# Patient Record
Sex: MALE | Race: WHITE | Employment: FULL TIME | URBAN - METROPOLITAN AREA
[De-identification: names, ages, dates, MRNs, and addresses within clinical notes are randomized per-mention and may not be internally consistent; named-entity substitution may affect disease eponyms.]

---

## 2017-07-01 ENCOUNTER — APPOINTMENT (OUTPATIENT)
Dept: CT IMAGING | Age: 52
DRG: 308 | End: 2017-07-01
Attending: EMERGENCY MEDICINE
Payer: COMMERCIAL

## 2017-07-01 ENCOUNTER — APPOINTMENT (OUTPATIENT)
Dept: GENERAL RADIOLOGY | Age: 52
DRG: 308 | End: 2017-07-01
Attending: EMERGENCY MEDICINE
Payer: COMMERCIAL

## 2017-07-01 ENCOUNTER — APPOINTMENT (OUTPATIENT)
Dept: CT IMAGING | Age: 52
DRG: 308 | End: 2017-07-01
Attending: FAMILY MEDICINE
Payer: COMMERCIAL

## 2017-07-01 ENCOUNTER — HOSPITAL ENCOUNTER (INPATIENT)
Age: 52
LOS: 4 days | Discharge: HOME | DRG: 308 | End: 2017-07-05
Attending: EMERGENCY MEDICINE | Admitting: FAMILY MEDICINE
Payer: COMMERCIAL

## 2017-07-01 DIAGNOSIS — I48.19 PERSISTENT ATRIAL FIBRILLATION (HCC): Primary | ICD-10-CM

## 2017-07-01 DIAGNOSIS — I50.9 ACUTE CONGESTIVE HEART FAILURE, UNSPECIFIED CONGESTIVE HEART FAILURE TYPE: ICD-10-CM

## 2017-07-01 PROBLEM — I48.91 A-FIB (HCC): Status: ACTIVE | Noted: 2017-07-01

## 2017-07-01 PROBLEM — R07.9 ACUTE CHEST PAIN: Status: ACTIVE | Noted: 2017-07-01

## 2017-07-01 PROBLEM — J90 PLEURAL EFFUSION, BILATERAL: Status: ACTIVE | Noted: 2017-07-01

## 2017-07-01 PROBLEM — R60.1 ANASARCA: Status: ACTIVE | Noted: 2017-07-01

## 2017-07-01 LAB
ALBUMIN SERPL BCP-MCNC: 3.4 G/DL (ref 3.5–5)
ALBUMIN/GLOB SERPL: 0.8 {RATIO} (ref 1.1–2.2)
ALP SERPL-CCNC: 99 U/L (ref 45–117)
ALT SERPL-CCNC: 53 U/L (ref 12–78)
ANION GAP BLD CALC-SCNC: 9 MMOL/L (ref 5–15)
APTT PPP: 26.6 SEC (ref 22.1–32.5)
ARTERIAL PATENCY WRIST A: YES
AST SERPL W P-5'-P-CCNC: 37 U/L (ref 15–37)
BASE EXCESS BLD CALC-SCNC: 1 MMOL/L
BASOPHILS # BLD AUTO: 0 K/UL (ref 0–0.1)
BASOPHILS # BLD AUTO: 0 K/UL (ref 0–0.1)
BASOPHILS # BLD: 0 % (ref 0–1)
BASOPHILS # BLD: 0 % (ref 0–1)
BDY SITE: ABNORMAL
BILIRUB SERPL-MCNC: 1.2 MG/DL (ref 0.2–1)
BNP SERPL-MCNC: 132 PG/ML (ref 0–100)
BUN SERPL-MCNC: 15 MG/DL (ref 6–20)
BUN/CREAT SERPL: 11 (ref 12–20)
CALCIUM SERPL-MCNC: 8.7 MG/DL (ref 8.5–10.1)
CHLORIDE SERPL-SCNC: 106 MMOL/L (ref 97–108)
CK SERPL-CCNC: 194 U/L (ref 39–308)
CO2 SERPL-SCNC: 24 MMOL/L (ref 21–32)
CREAT SERPL-MCNC: 1.33 MG/DL (ref 0.7–1.3)
D DIMER PPP FEU-MCNC: 1.42 MG/L FEU (ref 0–0.65)
EOSINOPHIL # BLD: 0.2 K/UL (ref 0–0.4)
EOSINOPHIL # BLD: 0.3 K/UL (ref 0–0.4)
EOSINOPHIL NFR BLD: 2 % (ref 0–7)
EOSINOPHIL NFR BLD: 2 % (ref 0–7)
ERYTHROCYTE [DISTWIDTH] IN BLOOD BY AUTOMATED COUNT: 15.2 % (ref 11.5–14.5)
ERYTHROCYTE [DISTWIDTH] IN BLOOD BY AUTOMATED COUNT: 15.3 % (ref 11.5–14.5)
GAS FLOW.O2 O2 DELIVERY SYS: ABNORMAL L/MIN
GAS FLOW.O2 SETTING OXYMISER: 2 L/M
GLOBULIN SER CALC-MCNC: 4.3 G/DL (ref 2–4)
GLUCOSE SERPL-MCNC: 126 MG/DL (ref 65–100)
HCO3 BLD-SCNC: 25.4 MMOL/L (ref 22–26)
HCT VFR BLD AUTO: 41.1 % (ref 36.6–50.3)
HCT VFR BLD AUTO: 41.4 % (ref 36.6–50.3)
HGB BLD-MCNC: 13.3 G/DL (ref 12.1–17)
HGB BLD-MCNC: 13.5 G/DL (ref 12.1–17)
INR PPP: 1.1 (ref 0.9–1.1)
LIPASE SERPL-CCNC: 169 U/L (ref 73–393)
LYMPHOCYTES # BLD AUTO: 25 % (ref 12–49)
LYMPHOCYTES # BLD AUTO: 26 % (ref 12–49)
LYMPHOCYTES # BLD: 3.1 K/UL (ref 0.8–3.5)
LYMPHOCYTES # BLD: 3.1 K/UL (ref 0.8–3.5)
MCH RBC QN AUTO: 26 PG (ref 26–34)
MCH RBC QN AUTO: 26.2 PG (ref 26–34)
MCHC RBC AUTO-ENTMCNC: 32.4 G/DL (ref 30–36.5)
MCHC RBC AUTO-ENTMCNC: 32.6 G/DL (ref 30–36.5)
MCV RBC AUTO: 80.4 FL (ref 80–99)
MCV RBC AUTO: 80.4 FL (ref 80–99)
MONOCYTES # BLD: 0.7 K/UL (ref 0–1)
MONOCYTES # BLD: 0.8 K/UL (ref 0–1)
MONOCYTES NFR BLD AUTO: 6 % (ref 5–13)
MONOCYTES NFR BLD AUTO: 7 % (ref 5–13)
NEUTS SEG # BLD: 7.8 K/UL (ref 1.8–8)
NEUTS SEG # BLD: 8 K/UL (ref 1.8–8)
NEUTS SEG NFR BLD AUTO: 66 % (ref 32–75)
NEUTS SEG NFR BLD AUTO: 66 % (ref 32–75)
PCO2 BLD: 39 MMHG (ref 35–45)
PH BLD: 7.42 [PH] (ref 7.35–7.45)
PLATELET # BLD AUTO: 289 K/UL (ref 150–400)
PLATELET # BLD AUTO: 294 K/UL (ref 150–400)
PO2 BLD: 63 MMHG (ref 80–100)
POTASSIUM SERPL-SCNC: 4.2 MMOL/L (ref 3.5–5.1)
PROT SERPL-MCNC: 7.7 G/DL (ref 6.4–8.2)
PROTHROMBIN TIME: 11.5 SEC (ref 9–11.1)
RBC # BLD AUTO: 5.11 M/UL (ref 4.1–5.7)
RBC # BLD AUTO: 5.15 M/UL (ref 4.1–5.7)
SAO2 % BLD: 92 % (ref 92–97)
SODIUM SERPL-SCNC: 139 MMOL/L (ref 136–145)
SPECIMEN TYPE: ABNORMAL
THERAPEUTIC RANGE,PTTT: NORMAL SECS (ref 58–77)
TOTAL RESP. RATE, ITRR: 27
TROPONIN I SERPL-MCNC: <0.04 NG/ML
WBC # BLD AUTO: 11.8 K/UL (ref 4.1–11.1)
WBC # BLD AUTO: 12.2 K/UL (ref 4.1–11.1)

## 2017-07-01 PROCEDURE — 96375 TX/PRO/DX INJ NEW DRUG ADDON: CPT

## 2017-07-01 PROCEDURE — 93005 ELECTROCARDIOGRAM TRACING: CPT

## 2017-07-01 PROCEDURE — 83880 ASSAY OF NATRIURETIC PEPTIDE: CPT | Performed by: FAMILY MEDICINE

## 2017-07-01 PROCEDURE — 82803 BLOOD GASES ANY COMBINATION: CPT

## 2017-07-01 PROCEDURE — 85379 FIBRIN DEGRADATION QUANT: CPT | Performed by: FAMILY MEDICINE

## 2017-07-01 PROCEDURE — 36415 COLL VENOUS BLD VENIPUNCTURE: CPT | Performed by: EMERGENCY MEDICINE

## 2017-07-01 PROCEDURE — 74011250636 HC RX REV CODE- 250/636: Performed by: EMERGENCY MEDICINE

## 2017-07-01 PROCEDURE — 36600 WITHDRAWAL OF ARTERIAL BLOOD: CPT

## 2017-07-01 PROCEDURE — 74176 CT ABD & PELVIS W/O CONTRAST: CPT

## 2017-07-01 PROCEDURE — 74011000258 HC RX REV CODE- 258: Performed by: EMERGENCY MEDICINE

## 2017-07-01 PROCEDURE — 74011000250 HC RX REV CODE- 250: Performed by: EMERGENCY MEDICINE

## 2017-07-01 PROCEDURE — 65620000000 HC RM CCU GENERAL

## 2017-07-01 PROCEDURE — 96365 THER/PROPH/DIAG IV INF INIT: CPT

## 2017-07-01 PROCEDURE — 74011250636 HC RX REV CODE- 250/636: Performed by: FAMILY MEDICINE

## 2017-07-01 PROCEDURE — 71250 CT THORAX DX C-: CPT

## 2017-07-01 PROCEDURE — 85730 THROMBOPLASTIN TIME PARTIAL: CPT | Performed by: FAMILY MEDICINE

## 2017-07-01 PROCEDURE — 85025 COMPLETE CBC W/AUTO DIFF WBC: CPT | Performed by: EMERGENCY MEDICINE

## 2017-07-01 PROCEDURE — 82550 ASSAY OF CK (CPK): CPT | Performed by: EMERGENCY MEDICINE

## 2017-07-01 PROCEDURE — 77030012879 HC MSK CPAP FLL FAC PHIL -B

## 2017-07-01 PROCEDURE — 71010 XR CHEST PORT: CPT

## 2017-07-01 PROCEDURE — 85610 PROTHROMBIN TIME: CPT | Performed by: FAMILY MEDICINE

## 2017-07-01 PROCEDURE — 99285 EMERGENCY DEPT VISIT HI MDM: CPT

## 2017-07-01 PROCEDURE — 84484 ASSAY OF TROPONIN QUANT: CPT | Performed by: EMERGENCY MEDICINE

## 2017-07-01 PROCEDURE — 80053 COMPREHEN METABOLIC PANEL: CPT | Performed by: EMERGENCY MEDICINE

## 2017-07-01 PROCEDURE — 83690 ASSAY OF LIPASE: CPT | Performed by: EMERGENCY MEDICINE

## 2017-07-01 RX ORDER — DILTIAZEM HYDROCHLORIDE 5 MG/ML
20 INJECTION INTRAVENOUS
Status: COMPLETED | OUTPATIENT
Start: 2017-07-01 | End: 2017-07-01

## 2017-07-01 RX ORDER — SODIUM CHLORIDE 0.9 % (FLUSH) 0.9 %
5-10 SYRINGE (ML) INJECTION AS NEEDED
Status: DISCONTINUED | OUTPATIENT
Start: 2017-07-01 | End: 2017-07-05 | Stop reason: HOSPADM

## 2017-07-01 RX ORDER — HEPARIN SODIUM 5000 [USP'U]/ML
10000 INJECTION, SOLUTION INTRAVENOUS; SUBCUTANEOUS ONCE
Status: COMPLETED | OUTPATIENT
Start: 2017-07-01 | End: 2017-07-01

## 2017-07-01 RX ORDER — FUROSEMIDE 10 MG/ML
40 INJECTION INTRAMUSCULAR; INTRAVENOUS
Status: COMPLETED | OUTPATIENT
Start: 2017-07-01 | End: 2017-07-01

## 2017-07-01 RX ORDER — METOPROLOL TARTRATE 25 MG/1
25 TABLET, FILM COATED ORAL EVERY 8 HOURS
Status: DISCONTINUED | OUTPATIENT
Start: 2017-07-01 | End: 2017-07-03

## 2017-07-01 RX ORDER — HEPARIN SODIUM 10000 [USP'U]/100ML
9-36 INJECTION, SOLUTION INTRAVENOUS
Status: DISCONTINUED | OUTPATIENT
Start: 2017-07-01 | End: 2017-07-02 | Stop reason: SDUPTHER

## 2017-07-01 RX ORDER — DILTIAZEM HYDROCHLORIDE 5 MG/ML
10 INJECTION INTRAVENOUS
Status: COMPLETED | OUTPATIENT
Start: 2017-07-01 | End: 2017-07-01

## 2017-07-01 RX ORDER — FUROSEMIDE 10 MG/ML
40 INJECTION INTRAMUSCULAR; INTRAVENOUS DAILY
Status: DISCONTINUED | OUTPATIENT
Start: 2017-07-02 | End: 2017-07-05 | Stop reason: HOSPADM

## 2017-07-01 RX ORDER — SODIUM CHLORIDE 0.9 % (FLUSH) 0.9 %
5-10 SYRINGE (ML) INJECTION EVERY 8 HOURS
Status: DISCONTINUED | OUTPATIENT
Start: 2017-07-01 | End: 2017-07-05 | Stop reason: HOSPADM

## 2017-07-01 RX ADMIN — DILTIAZEM HYDROCHLORIDE 10 MG/HR: 5 INJECTION, SOLUTION INTRAVENOUS at 17:54

## 2017-07-01 RX ADMIN — DILTIAZEM HYDROCHLORIDE 20 MG: 5 INJECTION INTRAVENOUS at 17:51

## 2017-07-01 RX ADMIN — HEPARIN SODIUM AND DEXTROSE 9 UNITS/KG/HR: 10000; 5 INJECTION INTRAVENOUS at 20:49

## 2017-07-01 RX ADMIN — FUROSEMIDE 40 MG: 10 INJECTION, SOLUTION INTRAMUSCULAR; INTRAVENOUS at 18:59

## 2017-07-01 RX ADMIN — HEPARIN SODIUM 10000 UNITS: 5000 INJECTION, SOLUTION INTRAVENOUS; SUBCUTANEOUS at 20:49

## 2017-07-01 RX ADMIN — DILTIAZEM HYDROCHLORIDE 10 MG: 5 INJECTION INTRAVENOUS at 18:27

## 2017-07-01 NOTE — ED NOTES
Assumed care of patient from JEFF Gonzalez. Patient resting on stretcher, NAD noted at this time; Denies complaints. Bed low and locked, call bell in reach. Will continue to monitor.

## 2017-07-01 NOTE — ROUTINE PROCESS
TRANSFER - OUT REPORT:    Verbal report given to RN on Erskin Butts  being transferred to CCU 22 for routine progression of care       Report consisted of patients Situation, Background, Assessment and   Recommendations(SBAR). Information from the following report(s) SBAR was reviewed with the receiving nurse. Lines:   Peripheral IV 07/01/17 Right Antecubital (Active)   Site Assessment Clean, dry, & intact 7/1/2017  5:36 PM   Phlebitis Assessment 0 7/1/2017  5:36 PM   Infiltration Assessment 0 7/1/2017  5:36 PM   Dressing Status Clean, dry, & intact 7/1/2017  5:36 PM        Opportunity for questions and clarification was provided.       Patient transported with:   [employer]

## 2017-07-01 NOTE — ED PROVIDER NOTES
HPI Comments: 49 y.o. male with past medical history significant for arrhythmia who presents from [facility] with chief complaint of SOB. Patient has reportedly had a \"hacking\" cough and shortness of breath for almost 2 weeks. He reports being seen at [facility] and was referred to the ED after having an elevated heart rate and atrial fibrillation. Patient states that he was diagnosed with atrial fibrillation almost 1.5 years ago and was prescribed metoprolol. He reports stopping the metoprolol shortly after starting it due to associated dizziness. Patient complains of some occasional pleuritic chest pain and intermittent leg swelling. He denies having any wheezing, syncope, nausea, vomiting, or fever. There are no other acute medical concerns at this time. Social hx: nonsmoker, occasional EtOH use, no drug use  PCP: Kelby Dubin, MD  Cardiologist: Skyla Ott MD    Note written by Radha Nunez, as dictated by Yumi Montesinos MD 5:48 PM      The history is provided by the patient. No  was used. Past Medical History:   Diagnosis Date    Arrhythmia        History reviewed. No pertinent surgical history. Family History:   Problem Relation Age of Onset    Heart Disease Father     Stroke Mother        Social History     Social History    Marital status:      Spouse name: N/A    Number of children: N/A    Years of education: N/A     Occupational History    Not on file. Social History Main Topics    Smoking status: Never Smoker    Smokeless tobacco: Former User    Alcohol use No      Comment: Occassionally    Drug use: No    Sexual activity: Not on file     Other Topics Concern    Not on file     Social History Narrative         ALLERGIES: Review of patient's allergies indicates no known allergies. Review of Systems   Constitutional: Negative for fever. Eyes: Negative for visual disturbance.    Respiratory: Positive for cough and shortness of breath. Negative for wheezing. Cardiovascular: Positive for chest pain and leg swelling. Gastrointestinal: Negative for abdominal pain, diarrhea, nausea and vomiting. Genitourinary: Negative for dysuria. Musculoskeletal: Negative. Negative for back pain and neck stiffness. Skin: Negative for rash. Neurological: Negative. Negative for syncope and headaches. Psychiatric/Behavioral: Negative for confusion. All other systems reviewed and are negative. Vitals:    07/01/17 1733 07/01/17 1735   BP: 143/84 143/84   Resp: 22    SpO2: 95%    Weight: 158.8 kg (350 lb)    Height: 5' 9\" (1.753 m)             Physical Exam   Constitutional: He appears well-developed and well-nourished. No distress. HENT:   Head: Normocephalic. Eyes: Pupils are equal, round, and reactive to light. Neck: Normal range of motion. Cardiovascular: An irregular rhythm present. Tachycardia present. No murmur heard. Pulmonary/Chest: Effort normal and breath sounds normal. No respiratory distress. Abdominal: Soft. He exhibits distension. There is tenderness in the left upper quadrant and left lower quadrant. Musculoskeletal: Normal range of motion. He exhibits edema (2+ in BLE). Neurological: He is alert. He has normal strength. No cranial nerve deficit. Skin: Skin is warm and dry. Psychiatric: He has a normal mood and affect. His behavior is normal.   Nursing note and vitals reviewed. Note written by Radha Tavera, as dictated by Latrell Baldwin MD 5:48 PM    [facility]  ED Course       Procedures  ED EKG interpretation:  Rhythm: atrial fib; and regular . Rate (approx.): 166; Axis: normal; ST/T wave: non-specific changes. Note written by Radha Tavera, as dictated by Latrell Baldwin MD 6:01 PM    CONSULT NOTE:  6:58 PM Latrell Baldwin MD spoke with Dr. Fabián Feliciano, Consult for Hospitalist.  Discussed available diagnostic tests and clinical findings. He is in agreement with care plans as outlined.    Luz Maria Bob will admit the patient.

## 2017-07-01 NOTE — IP AVS SNAPSHOT
[address] [address] 
[phone] Patient: Kody Sol MRN: [id number] :1965 You are allergic to the following No active allergies Recent Documentation Height Weight BMI Smoking Status 1.753 m 155.2 kg 50.53 kg/m2 Never Smoker Emergency Contacts Name Discharge Info Relation Home Work Mobile Rosario Marcano  Spouse [3] [phone] Helen Marcano  Spouse [3] [phone] About your hospitalization You were admitted on:  2017 You last received care in the:  [facility] 4 CORONARY CARE You were discharged on:  2017 Unit phone number:  [phone] Why you were hospitalized Your primary diagnosis was:  A-Fib (Hcc) Your diagnoses also included:  Anasarca, Pleural Effusion, Bilateral  
  
  
 
  
  
Providers Seen During Your Hospitalizations Provider Role Specialty Primary office phone Jaleel Valle MD Attending Provider Emergency Medicine [phone] Haris Hdz MD Attending Provider [facility] [phone] Samanta Lim MD Attending Provider Hospitalist [phone] Reji Clark MD Attending Provider Internal Medicine [phone] Your Primary Care Physician (PCP) Primary Care Physician Office Phone Office Fax Yifan Ordonez [phone] Follow-up Information Follow up With Details Comments Contact Info Roberto Brand MD In 1 week  [address] 
[phone] Lata Lozada MD On 2017 [address] [facility] [address] [facility] 
[phone] Your Appointments 2017 10:00 AM EDT  
ESTABLISHED PATIENT with Lata Lozada MD  
[facility] ([address]) [address] [place] [address] [address] [facility]  
[phone] Current Discharge Medication List  
  
 START taking these medications Dose & Instructions Dispensing Information Comments Morning Noon Evening Bedtime  
 apixaban 5 mg tablet Commonly known as:  Andrew Morejon Your last dose was: Your next dose is:    
   
   
 Dose:  5 mg Take 1 Tab by mouth two (2) times a day. Quantity:  60 Tab Refills:  0  
     
   
   
   
  
 dofetilide 250 mcg capsule Commonly known as:  Caitlin Fisher Your last dose was: Your next dose is:    
   
   
 Dose:  250 mcg Take 1 Cap by mouth every twelve (12) hours every twelve (12) hours. Indications: PREVENTION OF RECURRENT ATRIAL FIBRILLATION Quantity:  60 Cap Refills:  3  
     
   
   
   
  
 furosemide 40 mg tablet Commonly known as:  LASIX Your last dose was: Your next dose is:    
   
   
 1 tab PO daily Quantity:  30 Tab Refills:  1  
     
   
   
   
  
 lisinopril 10 mg tablet Commonly known as:  Rodolfo Boehringer Your last dose was: Your next dose is:    
   
   
 Dose:  10 mg Take 1 Tab by mouth daily. Quantity:  30 Tab Refills:  1 CONTINUE these medications which have CHANGED Dose & Instructions Dispensing Information Comments Morning Noon Evening Bedtime  
 metoprolol succinate 50 mg XL tablet Commonly known as:  TOPROL-XL What changed:  when to take this Your last dose was: Your next dose is:    
   
   
 Dose:  50 mg Take 1 Tab by mouth daily. Quantity:  30 Tab Refills:  0 STOP taking these medications   
 aspirin, buffered 81 mg Tab Where to Get Your Medications Information on where to get these meds will be given to you by the nurse or doctor. ! Ask your nurse or doctor about these medications  
  apixaban 5 mg tablet  
 dofetilide 250 mcg capsule  
 furosemide 40 mg tablet  
 lisinopril 10 mg tablet  
 metoprolol succinate 50 mg XL tablet Discharge Instructions Discharge Instructions PATIENT ID: Kody Sol MRN: [id number] YOB: 1965 DATE OF ADMISSION: 7/1/2017  5:27 PM   
DATE OF DISCHARGE: 7/5/2017 PRIMARY CARE PROVIDER: Haim Chun MD  
 
ATTENDING PHYSICIAN: Reji Clark MD 
DISCHARGING PROVIDER: Fatemeh Shelley NP To contact this individual call [phone] and ask the  to page. If unavailable ask to be transferred the Adult Hospitalist Department. DISCHARGE DIAGNOSES A fib CONSULTATIONS: IP CONSULT TO HOSPITALIST 
IP CONSULT TO CARDIOLOGY 
IP CONSULT TO CARDIOLOGY PROCEDURES/SURGERIES: * No surgery found * PENDING TEST RESULTS:  
At the time of discharge the following test results are still pending: none FOLLOW UP APPOINTMENTS:  
Follow-up Information Follow up With Details Comments Contact Info Roberto Brand MD In 1 week  [facility] [address] [place] [address] 
[phone] Lata Lozada MD On 7/7/2017 [address] [facility] [address] [place] [address] 
[phone] ADDITIONAL CARE RECOMMENDATIONS:  
Follow up with Dr Anna Marie Farmer in 1-2 weeks DIET: Cardiac Diet ACTIVITY: Activity as tolerated DISCHARGE MEDICATIONS: 
 See Medication Reconciliation Form · It is important that you take the medication exactly as they are prescribed. · Keep your medication in the bottles provided by the pharmacist and keep a list of the medication names, dosages, and times to be taken in your wallet. · Do not take other medications without consulting your doctor. NOTIFY YOUR PHYSICIAN FOR ANY OF THE FOLLOWING:  
Fever over 101 degrees for 24 hours. Chest pain, shortness of breath, fever, chills, nausea, vomiting, diarrhea, change in mentation, falling, weakness, bleeding. Severe pain or pain not relieved by medications. Or, any other signs or symptoms that you may have questions about.  
 
 
DISPOSITION: 
x  Home With: 
 OT  PT  [facility]  RN  
  
 SNF/Inpatient Rehab/LTAC Independent/assisted living Hospice Other: CDMP Checked:  
Yes x PROBLEM LIST Updated: 
Yes x Signed: Alix Kitchen NP 
7/5/2017 
1:18 PM 
 
Discharge Instructions Attachments/References HEART FAILURE: AVOIDING TRIGGERS (ENGLISH) Discharge Orders None Introducing [facility]! [facility] [employer] introduces [employer] patient portal. Now you can access parts of your medical record, email your doctor's office, and request medication refills online. 1. In your internet browser, go to [email or web address] 2. Click on the First Time User? Click Here link in the Sign In box. You will see the New Member Sign Up page. 3. Enter your Co[employer] Path Access Code exactly as it appears below. You will not need to use this code after youve completed the sign-up process. If you do not sign up before the expiration date, you must request a new code. · [employer] Access Code: [email or web address] Expires: 10/1/2017  2:33 PM 
 
4. Enter the last four digits of your Social Security Number (xxxx) and Date of Birth (mm/dd/yyyy) as indicated and click Submit. You will be taken to the next sign-up page. 5. Create a Coherent Path ID. This will be your Coherent Path login ID and cannot be changed, so think of one that is secure and easy to remember. 6. Create a [employer] Path password. You can change your password at any time. 7. Enter your Password Reset Question and Answer. This can be used at a later time if you forget your password. 8. Enter your e-mail address. You will receive e-mail notification when new information is available in [address] 9. Click Sign Up. You can now view and download portions of your medical record. 10. Click the Download Summary menu link to download a portable copy of your medical information.  
 
If you have questions, please visit the Frequently Asked Questions section of [employer]. Remember, MyChart is NOT to be used for urgent needs. For medical emergencies, dial 911. Now available from your iPhone and Android! General Information Please provide this summary of care documentation to your next provider. Patient Signature:  ____________________________________________________________ Date:  ____________________________________________________________  
  
Catrachita Drake Provider Signature:  ____________________________________________________________ Date:  ____________________________________________________________ More Information Avoiding Triggers With Heart Failure: Care Instructions Your Care Instructions Triggers are anything that make your heart failure flare up. A flare-up is also called \"sudden heart failure\" or \"acute heart failure. \" When you have a flare-up, fluid builds up in your lungs, and you have problems breathing. You might need to go to the hospital. By watching for changes in your condition and avoiding triggers, you can prevent heart failure flare-ups. Follow-up care is a key part of your treatment and safety. Be sure to make and go to all appointments, and call your doctor if you are having problems. It's also a good idea to know your test results and keep a list of the medicines you take. How can you care for yourself at home? Watch for changes in your weight and condition · Weigh yourself without clothing at the same time each day. Record your weight. Call your doctor if you have sudden weight gain, such as more than 2 to 3 pounds in a day or 5 pounds in a week. (Your doctor may suggest a different range of weight gain.) A sudden weight gain may mean that your heart failure is getting worse. · Keep a daily record of your symptoms. Write down any changes in how you feel, such as new shortness of breath, cough, or problems eating.  Also record if your ankles are more swollen than usual and if you feel more tired than usual. Note anything that you ate or did that could have triggered these changes. Limit sodium Sodium causes your body to hold on to extra water. This may cause your heart failure symptoms to get worse. People get most of their sodium from processed foods. Fast food and restaurant meals also tend to be very high in sodium. · Your doctor may suggest that you limit sodium to 2,000 milligrams (mg) a day or less. That is less than 1 teaspoon of salt a day, including all the salt you eat in cooking or in packaged foods. · Read food labels on cans and food packages. They tell you how much sodium you get in one serving. Check the serving size. If you eat more than one serving, you are getting more sodium. · Be aware that sodium can come in forms other than salt, including monosodium glutamate (MSG), sodium citrate, and sodium bicarbonate (baking soda). MSG is often added to Asian food. You can sometimes ask for food without MSG or salt. · Slowly reducing salt will help you adjust to the taste. Take the salt shaker off the table. · Flavor your food with garlic, lemon juice, onion, vinegar, herbs, and spices instead of salt. Do not use soy sauce, steak sauce, onion salt, garlic salt, mustard, or ketchup on your food, unless it is labeled \"low-sodium\" or \"low-salt. \" 
· Make your own salad dressings, sauces, and ketchup without adding salt. · Use fresh or frozen ingredients, instead of canned ones, whenever you can. Choose low-sodium canned goods. · Eat less processed food and food from restaurants, including fast food. Exercise as directed Moderate, regular exercise is very good for your heart. It improves your blood flow and helps control your weight. But too much exercise can stress your heart and cause a heart failure flare-up.  
· Check with your doctor before you start an exercise program. 
 · Walking is an easy way to get exercise. Start out slowly. Gradually increase the length and pace of your walk. Swimming, riding a bike, and using a treadmill are also good forms of exercise. · When you exercise, watch for signs that your heart is working too hard. You are pushing yourself too hard if you cannot talk while you are exercising. If you become short of breath or dizzy or have chest pain, stop, sit down, and rest. 
· Do not exercise when you do not feel well. Take medicines correctly · Take your medicines exactly as prescribed. Call your doctor if you think you are having a problem with your medicine. · Make a list of all the medicines you take. Include those prescribed to you by other doctors and any over-the-counter medicines, vitamins, or supplements you take. Take this list with you when you go to any doctor. · Take your medicines at the same time every day. It may help you to post a list of all the medicines you take every day and what time of day you take them. · Make taking your medicine as simple as you can. Plan times to take your medicines when you are doing other things, such as eating a meal or getting ready for bed. This will make it easier to remember to take your medicines. · Get organized. Use helpful tools, such as daily or weekly pill containers. When should you call for help? Call 911 if you have symptoms of sudden heart failure such as: 
· You have severe trouble breathing. · You cough up pink, foamy mucus. · You have a new irregular or rapid heartbeat. Call your doctor now or seek immediate medical care if: 
· You have new or increased shortness of breath. · You are dizzy or lightheaded, or you feel like you may faint. · You have sudden weight gain, such as more than 2 to 3 pounds in a day or 5 pounds in a week. (Your doctor may suggest a different range of weight gain.) · You have increased swelling in your legs, ankles, or feet. · You are suddenly so tired or weak that you cannot do your usual activities. Watch closely for changes in your health, and be sure to contact your doctor if you develop new symptoms. Where can you learn more? Go to http://loreto-stephanie.info/. Enter K381 in the search box to learn more about \"Avoiding Triggers With Heart Failure: Care Instructions. \" Current as of: February 23, 2017 Content Version: 11.3 © 3492-6873 [employer]. Care instructions adapted under license by [employer] (which disclaims liability or warranty for this information). If you have questions about a medical condition or this instruction, always ask your healthcare professional. [address] any warranty or liability for your use of this information.

## 2017-07-01 NOTE — IP AVS SNAPSHOT
Current Discharge Medication List  
  
START taking these medications Dose & Instructions Dispensing Information Comments Morning Noon Evening Bedtime  
 apixaban 5 mg tablet Commonly known as:  Lane Boots Your last dose was: Your next dose is:    
   
   
 Dose:  5 mg Take 1 Tab by mouth two (2) times a day. Quantity:  60 Tab Refills:  0  
     
   
   
   
  
 dofetilide 250 mcg capsule Commonly known as:  Cayden Jaramillo Your last dose was: Your next dose is:    
   
   
 Dose:  250 mcg Take 1 Cap by mouth every twelve (12) hours every twelve (12) hours. Indications: PREVENTION OF RECURRENT ATRIAL FIBRILLATION Quantity:  60 Cap Refills:  3  
     
   
   
   
  
 furosemide 40 mg tablet Commonly known as:  LASIX Your last dose was: Your next dose is:    
   
   
 1 tab PO daily Quantity:  30 Tab Refills:  1  
     
   
   
   
  
 lisinopril 10 mg tablet Commonly known as:  Robin Spencer Your last dose was: Your next dose is:    
   
   
 Dose:  10 mg Take 1 Tab by mouth daily. Quantity:  30 Tab Refills:  1 CONTINUE these medications which have CHANGED Dose & Instructions Dispensing Information Comments Morning Noon Evening Bedtime  
 metoprolol succinate 50 mg XL tablet Commonly known as:  TOPROL-XL What changed:  when to take this Your last dose was: Your next dose is:    
   
   
 Dose:  50 mg Take 1 Tab by mouth daily. Quantity:  30 Tab Refills:  0 STOP taking these medications   
 aspirin, buffered 81 mg Tab Where to Get Your Medications Information on where to get these meds will be given to you by the nurse or doctor. ! Ask your nurse or doctor about these medications  
  apixaban 5 mg tablet  
 dofetilide 250 mcg capsule  
 furosemide 40 mg tablet  
 lisinopril 10 mg tablet metoprolol succinate 50 mg XL tablet

## 2017-07-01 NOTE — ED TRIAGE NOTES
Pt reports he was sent here from [facility] due to elevated heart rate. Pt states he has had a nonproductive cough with SOB for the past week. Pt also reports he has HX of Atrial fib but states he took himself the Metoprolol 50 mg PO BID and ASA 81 mg PO every day that he was prescribed by Dr Silviano Perez  Pt stopped taking the med's eighteen months ago.

## 2017-07-02 LAB
ANION GAP BLD CALC-SCNC: 9 MMOL/L (ref 5–15)
APTT PPP: 42.7 SEC (ref 22.1–32.5)
APTT PPP: 54.5 SEC (ref 22.1–32.5)
APTT PPP: 79.2 SEC (ref 22.1–32.5)
ATRIAL RATE: 138 BPM
BASOPHILS # BLD AUTO: 0.1 K/UL (ref 0–0.1)
BASOPHILS # BLD: 0 % (ref 0–1)
BUN SERPL-MCNC: 14 MG/DL (ref 6–20)
BUN/CREAT SERPL: 13 (ref 12–20)
CALCIUM SERPL-MCNC: 8.6 MG/DL (ref 8.5–10.1)
CALCULATED R AXIS, ECG10: 56 DEGREES
CALCULATED T AXIS, ECG11: 18 DEGREES
CHLORIDE SERPL-SCNC: 105 MMOL/L (ref 97–108)
CHOLEST SERPL-MCNC: 147 MG/DL
CO2 SERPL-SCNC: 25 MMOL/L (ref 21–32)
CREAT SERPL-MCNC: 1.04 MG/DL (ref 0.7–1.3)
DIAGNOSIS, 93000: NORMAL
EOSINOPHIL # BLD: 0.3 K/UL (ref 0–0.4)
EOSINOPHIL NFR BLD: 3 % (ref 0–7)
ERYTHROCYTE [DISTWIDTH] IN BLOOD BY AUTOMATED COUNT: 15.4 % (ref 11.5–14.5)
EST. AVERAGE GLUCOSE BLD GHB EST-MCNC: 146 MG/DL
GLUCOSE BLD STRIP.AUTO-MCNC: 115 MG/DL (ref 65–100)
GLUCOSE BLD STRIP.AUTO-MCNC: 117 MG/DL (ref 65–100)
GLUCOSE SERPL-MCNC: 108 MG/DL (ref 65–100)
HBA1C MFR BLD: 6.7 % (ref 4.2–6.3)
HCT VFR BLD AUTO: 38.4 % (ref 36.6–50.3)
HDLC SERPL-MCNC: 36 MG/DL
HDLC SERPL: 4.1 {RATIO} (ref 0–5)
HGB BLD-MCNC: 12.5 G/DL (ref 12.1–17)
LDLC SERPL CALC-MCNC: 88 MG/DL (ref 0–100)
LIPID PROFILE,FLP: NORMAL
LYMPHOCYTES # BLD AUTO: 33 % (ref 12–49)
LYMPHOCYTES # BLD: 3.7 K/UL (ref 0.8–3.5)
MCH RBC QN AUTO: 26.1 PG (ref 26–34)
MCHC RBC AUTO-ENTMCNC: 32.6 G/DL (ref 30–36.5)
MCV RBC AUTO: 80.2 FL (ref 80–99)
MONOCYTES # BLD: 0.7 K/UL (ref 0–1)
MONOCYTES NFR BLD AUTO: 6 % (ref 5–13)
NEUTS SEG # BLD: 6.4 K/UL (ref 1.8–8)
NEUTS SEG NFR BLD AUTO: 58 % (ref 32–75)
PLATELET # BLD AUTO: 245 K/UL (ref 150–400)
POTASSIUM SERPL-SCNC: 3.6 MMOL/L (ref 3.5–5.1)
Q-T INTERVAL, ECG07: 280 MS
QRS DURATION, ECG06: 88 MS
QTC CALCULATION (BEZET), ECG08: 465 MS
RBC # BLD AUTO: 4.79 M/UL (ref 4.1–5.7)
SERVICE CMNT-IMP: ABNORMAL
SERVICE CMNT-IMP: ABNORMAL
SODIUM SERPL-SCNC: 139 MMOL/L (ref 136–145)
THERAPEUTIC RANGE,PTTT: ABNORMAL SECS (ref 58–77)
TRIGL SERPL-MCNC: 115 MG/DL (ref ?–150)
VENTRICULAR RATE, ECG03: 166 BPM
VLDLC SERPL CALC-MCNC: 23 MG/DL
WBC # BLD AUTO: 11.2 K/UL (ref 4.1–11.1)

## 2017-07-02 PROCEDURE — 82962 GLUCOSE BLOOD TEST: CPT

## 2017-07-02 PROCEDURE — 85730 THROMBOPLASTIN TIME PARTIAL: CPT | Performed by: FAMILY MEDICINE

## 2017-07-02 PROCEDURE — 93970 EXTREMITY STUDY: CPT

## 2017-07-02 PROCEDURE — 93306 TTE W/DOPPLER COMPLETE: CPT

## 2017-07-02 PROCEDURE — 74011250637 HC RX REV CODE- 250/637: Performed by: SPECIALIST

## 2017-07-02 PROCEDURE — 80061 LIPID PANEL: CPT | Performed by: FAMILY MEDICINE

## 2017-07-02 PROCEDURE — 36415 COLL VENOUS BLD VENIPUNCTURE: CPT | Performed by: FAMILY MEDICINE

## 2017-07-02 PROCEDURE — 74011000258 HC RX REV CODE- 258: Performed by: SPECIALIST

## 2017-07-02 PROCEDURE — 83036 HEMOGLOBIN GLYCOSYLATED A1C: CPT | Performed by: SPECIALIST

## 2017-07-02 PROCEDURE — 77010033678 HC OXYGEN DAILY

## 2017-07-02 PROCEDURE — 80048 BASIC METABOLIC PNL TOTAL CA: CPT | Performed by: FAMILY MEDICINE

## 2017-07-02 PROCEDURE — 74011000250 HC RX REV CODE- 250: Performed by: FAMILY MEDICINE

## 2017-07-02 PROCEDURE — 74011000250 HC RX REV CODE- 250: Performed by: SPECIALIST

## 2017-07-02 PROCEDURE — 85730 THROMBOPLASTIN TIME PARTIAL: CPT | Performed by: SPECIALIST

## 2017-07-02 PROCEDURE — 74011250636 HC RX REV CODE- 250/636: Performed by: SPECIALIST

## 2017-07-02 PROCEDURE — 65620000000 HC RM CCU GENERAL

## 2017-07-02 PROCEDURE — 94660 CPAP INITIATION&MGMT: CPT

## 2017-07-02 PROCEDURE — 74011250636 HC RX REV CODE- 250/636: Performed by: FAMILY MEDICINE

## 2017-07-02 PROCEDURE — 74011250636 HC RX REV CODE- 250/636: Performed by: HOSPITALIST

## 2017-07-02 PROCEDURE — 85025 COMPLETE CBC W/AUTO DIFF WBC: CPT | Performed by: FAMILY MEDICINE

## 2017-07-02 RX ORDER — MAGNESIUM SULFATE 100 %
4 CRYSTALS MISCELLANEOUS AS NEEDED
Status: DISCONTINUED | OUTPATIENT
Start: 2017-07-02 | End: 2017-07-05 | Stop reason: HOSPADM

## 2017-07-02 RX ORDER — SODIUM CHLORIDE 0.9 % (FLUSH) 0.9 %
20 SYRINGE (ML) INJECTION
Status: COMPLETED | OUTPATIENT
Start: 2017-07-02 | End: 2017-07-02

## 2017-07-02 RX ORDER — DEXTROSE 50 % IN WATER (D50W) INTRAVENOUS SYRINGE
12.5-25 AS NEEDED
Status: DISCONTINUED | OUTPATIENT
Start: 2017-07-02 | End: 2017-07-02 | Stop reason: SDUPTHER

## 2017-07-02 RX ORDER — HEPARIN SODIUM 10000 [USP'U]/100ML
6-25 INJECTION, SOLUTION INTRAVENOUS
Status: DISCONTINUED | OUTPATIENT
Start: 2017-07-02 | End: 2017-07-03

## 2017-07-02 RX ORDER — DILTIAZEM HYDROCHLORIDE 180 MG/1
180 CAPSULE, COATED, EXTENDED RELEASE ORAL DAILY
Status: DISCONTINUED | OUTPATIENT
Start: 2017-07-02 | End: 2017-07-03

## 2017-07-02 RX ORDER — INSULIN LISPRO 100 [IU]/ML
INJECTION, SOLUTION INTRAVENOUS; SUBCUTANEOUS
Status: DISCONTINUED | OUTPATIENT
Start: 2017-07-02 | End: 2017-07-05 | Stop reason: HOSPADM

## 2017-07-02 RX ORDER — HEPARIN SODIUM 1000 [USP'U]/ML
2000 INJECTION, SOLUTION INTRAVENOUS; SUBCUTANEOUS ONCE
Status: COMPLETED | OUTPATIENT
Start: 2017-07-02 | End: 2017-07-02

## 2017-07-02 RX ADMIN — Medication 10 ML: at 07:09

## 2017-07-02 RX ADMIN — Medication 20 ML: at 11:27

## 2017-07-02 RX ADMIN — METOPROLOL TARTRATE 25 MG: 25 TABLET ORAL at 00:10

## 2017-07-02 RX ADMIN — METOPROLOL TARTRATE 25 MG: 25 TABLET ORAL at 21:15

## 2017-07-02 RX ADMIN — DILTIAZEM HYDROCHLORIDE 5 MG/HR: 5 INJECTION, SOLUTION INTRAVENOUS at 02:00

## 2017-07-02 RX ADMIN — FUROSEMIDE 40 MG: 10 INJECTION, SOLUTION INTRAMUSCULAR; INTRAVENOUS at 08:52

## 2017-07-02 RX ADMIN — Medication 10 ML: at 15:17

## 2017-07-02 RX ADMIN — PERFLUTREN 1.5 ML: 6.52 INJECTION, SUSPENSION INTRAVENOUS at 10:00

## 2017-07-02 RX ADMIN — Medication 10 ML: at 21:01

## 2017-07-02 RX ADMIN — DILTIAZEM HYDROCHLORIDE 180 MG: 180 CAPSULE, COATED, EXTENDED RELEASE ORAL at 11:27

## 2017-07-02 RX ADMIN — METOPROLOL TARTRATE 25 MG: 25 TABLET ORAL at 07:09

## 2017-07-02 RX ADMIN — HEPARIN SODIUM AND DEXTROSE 9 UNITS/KG/HR: 10000; 5 INJECTION INTRAVENOUS at 13:05

## 2017-07-02 RX ADMIN — METOPROLOL TARTRATE 25 MG: 25 TABLET ORAL at 15:17

## 2017-07-02 RX ADMIN — HEPARIN SODIUM 2000 UNITS: 1000 INJECTION, SOLUTION INTRAVENOUS; SUBCUTANEOUS at 13:03

## 2017-07-02 NOTE — PROGRESS NOTES
7/2/2017     Admit Date: 7/1/2017    Admit Diagnosis: A-fib ([facility] [address] Anasarca;Acute chest pain;Pleural effusion, loki*    Principal Problem:    A-fib ([facility] [address] (7/1/2017)    Active Problems:    Anasarca (7/1/2017)      Pleural effusion, bilateral (7/1/2017)        Subjective:  Feels better. Good diuresis. Rate better but still elevated when moves around       Eris Adorno denies chest pain, palpitations, syncope, orthopnea, paroxysmal nocturnal dyspnea, exertional chest pressure/discomfort, claudication. Assessment/Plan:  Will add po cardiazem and try to get off drip. Echo pending. Continue heparin for now, likely transition to oac's depending on whether he converts to nsr or not. Dr mei to see in consultation tomorrow. Gill Pickett MD    Objective:     Visit Vitals    /75    Pulse (!) 104    Temp 97.5 °F (36.4 °C)    Resp 21    Ht 5' 9\" (1.753 m)    Wt 348 lb 5.2 oz (158 kg)    SpO2 96%    BMI 51.44 kg/m2        Physical Exam:  Neck: supple, symmetrical, trachea midline, no adenopathy, no carotid bruit and no JVD  Heart: irregularly irregular rhythm, S1, S2 normal, no S3 or S4  Lungs: clear to auscultation bilaterally  Abdomen: soft, non-tender.  Bowel sounds normal. No masses,  no organomegaly  Extremities: varicose veins noted, edema tr      Labs:    @LABRCNT[CPK:1,CKQMB:1,CPKMB:1,CKMB:!,TROIQ:1@  Recent Labs      07/02/17   0310   NA  139   K  3.6   CL  105   BUN  14   CREA  1.04   GLU  108*   CA  8.6     Recent Labs      07/02/17 0310   WBC  11.2*   HGB  12.5   HCT  38.4   PLT  245     Recent Labs      07/02/17 0310   CHOL  147   LDLC  88       Telemetry: AFIB     Current Facility-Administered Medications   Medication Dose Route Frequency    sodium chloride (NS) flush 20 mL  20 mL IntraVENous RAD ONCE    perflutren lipid microspheres (DEFINITY) in NS bolus IV  1.5 mL IntraVENous RAD ONCE    dilTIAZem CD (CARDIZEM CD) capsule 180 mg  180 mg Oral DAILY    dilTIAZem (CARDIZEM) 125 mg in dextrose 5% 125 mL infusion  0-15 mg/hr IntraVENous TITRATE    sodium chloride (NS) flush 5-10 mL  5-10 mL IntraVENous Q8H    sodium chloride (NS) flush 5-10 mL  5-10 mL IntraVENous PRN    heparin 25,000 units in D5W 250 ml infusion  9-36 Units/kg/hr IntraVENous TITRATE    metoprolol tartrate (LOPRESSOR) tablet 25 mg  25 mg Oral Q8H    furosemide (LASIX) injection 40 mg  40 mg IntraVENous DAILY       Data Review: current meds, labs,recent radiology, intake/output/weight and problem list reviewed

## 2017-07-02 NOTE — PROGRESS NOTES
Hospitalist Progress Note      Hospital summary: 49 y.o man with afib, DM, GEE, morbid obesity, who presented with shortness of breath. He was found to be in afib w/ rvr 7/1/2017. Assessment/Plan:  Paroxysmal afib: (POA) w/ rvr on presentation  -continue diltiazem drip; transitioning to PO diltiazem per cardiology  -continue heparin drip; change protocol to afib/cardiac  -plan to consult Dr. Ubaldo Schirmer tomorrow    Acute pulmonary edema and anasarca: (POA) concerning for CHF. BNP only 132 but this is unreliable in obese patients  -continue IV furosemide    Acute renal insufficiency: (POA) Cr 0.95->1.33, suspect cardiorenal  -monitor with diuresis    GEE: (POA)  -continue home BiPAP    Type II DM: hgb a1c 6.7%    Morbid obesity    Code status: full  DVT prophylaxis: heparin drip  Disposition: home when medically appropriate  ----------------------------------------------    CC: shortness of breath    S: feels a little better compared to admission. No chest pain, does have a dry cough, denies leg swelling (though very apparent on exam), abdominal swelling is bothering him. No abdominal pain, n/v/d, palpitations, or dizziness. Review of Systems:  Pertinent items are noted in HPI.     O:  Visit Vitals    BP (!) 114/95    Pulse (!) 117    Temp 97.5 °F (36.4 °C)    Resp 24    Ht 5' 9\" (1.753 m)    Wt 158 kg (348 lb 5.2 oz)    SpO2 93%    BMI 51.44 kg/m2       PHYSICAL EXAM:  Gen: NAD, non-toxic  HEENT: anicteric sclerae, normal conjunctiva, MM moist  Neck: supple, thick neck  Heart: RRR, no MRG, unable to assess JVD due to obesity, 2+ BLE edema  Lungs: CTA b/l, diminished breath sounds globally, non-labored respirations  Abd: soft, NT, distended, BS+, unable to assess organomegaly due to obesity  Extr: warm  Skin: dry, mild chronic venous stasis changes  Neuro: CN II-XII grossly intact, normal speech, moves all extremities  Psych: normal mood, appropriate affect      Intake/Output Summary (Last 24 hours) at 07/02/17 1218  Last data filed at 07/02/17 1100   Gross per 24 hour   Intake           310.73 ml   Output             2970 ml   Net         -2659.27 ml        Recent labs & imaging reviewed:  Recent Labs      07/02/17 0310 07/01/17 2047   WBC  11.2*  12.2*   HGB  12.5  13.5   HCT  38.4  41.4   PLT  245  294     Recent Labs      07/02/17 0310 07/01/17   1738   NA  139  139   K  3.6  4.2   CL  105  106   CO2  25  24   BUN  14  15   CREA  1.04  1.33*   GLU  108*  126*   CA  8.6  8.7     Recent Labs      07/01/17   1738   SGOT  37   ALT  53   AP  99   TBILI  1.2*   TP  7.7   ALB  3.4*   GLOB  4.3*   LPSE  169     Recent Labs      07/02/17   1104  07/02/17 0310  07/01/17 2047   INR   --    --   1.1   PTP   --    --   11.5*   APTT  42.7*  79.2*  26.6        Recent Labs      07/01/17   1738   TROIQ  <0.04     Lab Results   Component Value Date/Time    Cholesterol, total 147 07/02/2017 03:10 AM    HDL Cholesterol 36 07/02/2017 03:10 AM    LDL, calculated 88 07/02/2017 03:10 AM    Triglyceride 115 07/02/2017 03:10 AM    CHOL/HDL Ratio 4.1 07/02/2017 03:10 AM     No results found for: GLUCPOC  No results found for: COLOR, APPRN, SPGRU, REFSG, NEIL, PROTU, GLUCU, KETU, BILU, UROU, EULALIA, LEUKU, GLUKE, EPSU, BACTU, WBCU, RBCU, CASTS, UCRY    Med list reviewed  Current Facility-Administered Medications   Medication Dose Route Frequency    dilTIAZem CD (CARDIZEM CD) capsule 180 mg  180 mg Oral DAILY    dilTIAZem (CARDIZEM) 125 mg in dextrose 5% 125 mL infusion  0-15 mg/hr IntraVENous TITRATE    sodium chloride (NS) flush 5-10 mL  5-10 mL IntraVENous Q8H    sodium chloride (NS) flush 5-10 mL  5-10 mL IntraVENous PRN    heparin 25,000 units in D5W 250 ml infusion  9-36 Units/kg/hr IntraVENous TITRATE    metoprolol tartrate (LOPRESSOR) tablet 25 mg  25 mg Oral Q8H    furosemide (LASIX) injection 40 mg  40 mg IntraVENous DAILY       Care Plan discussed with:  Patient/Family, Nurse and Consultant Dr. Silvia Hunter MD  Internal Medicine  Date of Service: 7/2/2017

## 2017-07-02 NOTE — CONSULTS
Date of  Admission: 7/1/2017  5:27 PM     Stephanie Saxena is a 49 y.o. male admitted for A-fib [facility]); Anasarca;Acute chest pain;Pleural effusion, loki*  Subjective: patient with known paroxysmal afib, has seen dr Steve Ng and dr mei last 2/16 at which time he was essentially assymptomatic on metoprolol 50 bid. Shortly thereafter he was having dizzy spells, perhaps orthostasis, and he stopped meds and has not fup since. Has had several normal stress tests and normal echo in the past. Couple weeks ago began with some sob and non productive cough, occasional pleuritic cp and dependent leg edema. Does not weigh himself on regular basis. Denies diabetes but last hgb a1c in chart was 6.9. He has sleep apnea and wears bipap nightly. denies palpitations, syncope, orthopnea, paroxysmal nocturnal dyspnea, exertional chest pressure/discomfort, claudication. Cardiac risk factors: family history, dyslipidemia, diabetes mellitus, obesity, male gender. Assessment/Plan: afib of unknown duration, perhaps related to clinical presentation. Pleural effusions and abdominal wall edema noted on ct scan so may be element of diastolic or systolic chf as a result of uncontrolled afib. Agree with cardiazem and will try low dose metoprolol po in addition. May be combination of the 2 drugs will be effective and tolerated. Would diurese while he is here and recheck echo. Ablation has been discussed with him in the past, but given his lack of symptoms and willingness to try meds in the past, this was not pursued. We should anticoagulate him at least for the short term in case he does not spontaneously convert and needs dc cardioversion. Once sinus rhythm is restored would consider antiarrhythmic. Will ask dr mei to resee on Monday.     Patient Active Problem List    Diagnosis Date Noted    Anasarca 07/01/2017    A-fib ([facility] 07/01/2017    Acute chest pain 07/01/2017    Pleural effusion, bilateral 07/01/2017    Atrial flutter ([facility] 01/29/2015    PAF (paroxysmal atrial fibrillation) ([facility] 12/03/2014    Obesity 12/03/2014    GEE (obstructive sleep apnea) 12/03/2014      Zeus Lazaro MD  Past Medical History:   Diagnosis Date    Arrhythmia     Sleep apnea       History reviewed. No pertinent surgical history. No Known Allergies   Family History   Problem Relation Age of Onset    Heart Disease Father     Stroke Mother       Current Facility-Administered Medications   Medication Dose Route Frequency    dilTIAZem (CARDIZEM) 125 mg in dextrose 5% 125 mL infusion  10 mg/hr IntraVENous TITRATE    sodium chloride (NS) flush 5-10 mL  5-10 mL IntraVENous Q8H    sodium chloride (NS) flush 5-10 mL  5-10 mL IntraVENous PRN    heparin 25,000 units in D5W 250 ml infusion  9-36 Units/kg/hr IntraVENous TITRATE         Review of Symptoms:  A comprehensive review of systems was negative except for that written in the HPI. Physical Exam    Visit Vitals    /72    Pulse (!) 125    Temp 98 °F (36.7 °C)    Resp 30    Ht 5' 9\" (1.753 m)    Wt (!) 350 lb 12 oz (159.1 kg)    SpO2 94%    BMI 51.8 kg/m2     Neck: supple, symmetrical, trachea midline, no adenopathy, no carotid bruit and no JVD  Heart: irregularly irregular rhythm, S1, S2 normal, no S3 or S4  Lungs: diminished breath sounds R base, L base  Abdomen: soft, non-tender.  Bowel sounds normal. No masses,  no organomegaly, morbidly obese  Extremities: edema tr  Pulses: 2+ and symmetric    Cardiographics    Telemetry: AFIB  ECG: atrial fibrillation, rate 160  Echocardiogram: Not done    Recent radiology, intake/output and wt reviewed    Labs:   Recent Results (from the past 24 hour(s))   EKG, 12 LEAD, INITIAL    Collection Time: 07/01/17  5:32 PM   Result Value Ref Range    Ventricular Rate 166 BPM    Atrial Rate 138 BPM    QRS Duration 88 ms    Q-T Interval 280 ms    QTC Calculation (Bezet) 465 ms    Calculated R Axis 56 degrees    Calculated T Axis 18 degrees    Diagnosis Atrial fibrillation  Nonspecific T wave abnormality  When compared with ECG of 25-NOV-2014 13:25,  Atrial fibrillation has replaced Sinus rhythm  Vent. rate has increased BY  86 BPM  Nonspecific T wave abnormality now evident in Lateral leads     CBC WITH AUTOMATED DIFF    Collection Time: 07/01/17  5:38 PM   Result Value Ref Range    WBC 11.8 (H) 4.1 - 11.1 K/uL    RBC 5.11 4.10 - 5.70 M/uL    HGB 13.3 12.1 - 17.0 g/dL    HCT 41.1 36.6 - 50.3 %    MCV 80.4 80.0 - 99.0 FL    MCH 26.0 26.0 - 34.0 PG    MCHC 32.4 30.0 - 36.5 g/dL    RDW 15.2 (H) 11.5 - 14.5 %    PLATELET 670 820 - 897 K/uL    NEUTROPHILS 66 32 - 75 %    LYMPHOCYTES 26 12 - 49 %    MONOCYTES 6 5 - 13 %    EOSINOPHILS 2 0 - 7 %    BASOPHILS 0 0 - 1 %    ABS. NEUTROPHILS 7.8 1.8 - 8.0 K/UL    ABS. LYMPHOCYTES 3.1 0.8 - 3.5 K/UL    ABS. MONOCYTES 0.7 0.0 - 1.0 K/UL    ABS. EOSINOPHILS 0.2 0.0 - 0.4 K/UL    ABS. BASOPHILS 0.0 0.0 - 0.1 K/UL   METABOLIC PANEL, COMPREHENSIVE    Collection Time: 07/01/17  5:38 PM   Result Value Ref Range    Sodium 139 136 - 145 mmol/L    Potassium 4.2 3.5 - 5.1 mmol/L    Chloride 106 97 - 108 mmol/L    CO2 24 21 - 32 mmol/L    Anion gap 9 5 - 15 mmol/L    Glucose 126 (H) 65 - 100 mg/dL    BUN 15 6 - 20 MG/DL    Creatinine 1.33 (H) 0.70 - 1.30 MG/DL    BUN/Creatinine ratio 11 (L) 12 - 20      GFR est AA >60 >60 ml/min/1.73m2    GFR est non-AA 57 (L) >60 ml/min/1.73m2    Calcium 8.7 8.5 - 10.1 MG/DL    Bilirubin, total 1.2 (H) 0.2 - 1.0 MG/DL    ALT (SGPT) 53 12 - 78 U/L    AST (SGOT) 37 15 - 37 U/L    Alk.  phosphatase 99 45 - 117 U/L    Protein, total 7.7 6.4 - 8.2 g/dL    Albumin 3.4 (L) 3.5 - 5.0 g/dL    Globulin 4.3 (H) 2.0 - 4.0 g/dL    A-G Ratio 0.8 (L) 1.1 - 2.2     CK W/ REFLX CKMB    Collection Time: 07/01/17  5:38 PM   Result Value Ref Range     39 - 308 U/L   TROPONIN I    Collection Time: 07/01/17  5:38 PM   Result Value Ref Range    Troponin-I, Qt. <0.04 <0.05 ng/mL   LIPASE    Collection Time: 07/01/17  5:38 PM   Result Value Ref Range    Lipase 169 73 - 393 U/L   CBC WITH AUTOMATED DIFF    Collection Time: 07/01/17  8:47 PM   Result Value Ref Range    WBC 12.2 (H) 4.1 - 11.1 K/uL    RBC 5.15 4.10 - 5.70 M/uL    HGB 13.5 12.1 - 17.0 g/dL    HCT 41.4 36.6 - 50.3 %    MCV 80.4 80.0 - 99.0 FL    MCH 26.2 26.0 - 34.0 PG    MCHC 32.6 30.0 - 36.5 g/dL    RDW 15.3 (H) 11.5 - 14.5 %    PLATELET 911 770 - 122 K/uL    NEUTROPHILS 66 32 - 75 %    LYMPHOCYTES 25 12 - 49 %    MONOCYTES 7 5 - 13 %    EOSINOPHILS 2 0 - 7 %    BASOPHILS 0 0 - 1 %    ABS. NEUTROPHILS 8.0 1.8 - 8.0 K/UL    ABS. LYMPHOCYTES 3.1 0.8 - 3.5 K/UL    ABS. MONOCYTES 0.8 0.0 - 1.0 K/UL    ABS. EOSINOPHILS 0.3 0.0 - 0.4 K/UL    ABS.  BASOPHILS 0.0 0.0 - 0.1 K/UL   PTT    Collection Time: 07/01/17  8:47 PM   Result Value Ref Range    aPTT 26.6 22.1 - 32.5 sec    aPTT, therapeutic range     58.0 - 77.0 SECS   PROTHROMBIN TIME + INR    Collection Time: 07/01/17  8:47 PM   Result Value Ref Range    INR 1.1 0.9 - 1.1      Prothrombin time 11.5 (H) 9.0 - 11.1 sec   D DIMER    Collection Time: 07/01/17  8:47 PM   Result Value Ref Range    D-dimer 1.42 (H) 0.00 - 0.65 mg/L FEU

## 2017-07-02 NOTE — PROCEDURES
[facility]  *** FINAL REPORT ***    Name: Pema Crowe  MRN: [id number]    Inpatient  : 02 Oct 1965  HIS Order #: [id number]  [id number] [facility] Visit #: [id number]  Date: 2017    TYPE OF TEST: Peripheral Venous Testing    REASON FOR TEST  Limb swelling    Right Leg:-  Deep venous thrombosis:           No  Superficial venous thrombosis:    No  Deep venous insufficiency:        Not examined  Superficial venous insufficiency: Not examined    Left Leg:-  Deep venous thrombosis:           No  Superficial venous thrombosis:    No  Deep venous insufficiency:        Not examined  Superficial venous insufficiency: Not examined      INTERPRETATION/FINDINGS  PROCEDURE:  Color duplex ultrasound imaging of lower extremity veins. FINDINGS:       Right: The common femoral, deep femoral, femoral, popliteal,  posterior tibial, peroneal, and great saphenous are patent and without   evidence of thrombus;  each is fully compressible and there is no  narrowing of the flow channel on color Doppler imaging. Phasic flow  is observed in the common femoral vein. Left:   The common femoral, deep femoral, femoral, popliteal,  posterior tibial, peroneal, and great saphenous are patent and without   evidence of thrombus;  each is fully compressible and there is no  narrowing of the flow channel on color Doppler imaging. Phasic flow  is observed in the common femoral vein. IMPRESSION:  No evidence of right or left lower extremity vein  thrombosis. ADDITIONAL COMMENTS    I have personally reviewed the data relevant to the interpretation of  this  study.     TECHNOLOGIST: Gaby Leon RVT  Signed: 2017 12:57 AM    PHYSICIAN: Marilu Madrid MD  Signed: 2017 06:24 AM

## 2017-07-02 NOTE — H&P
[address]   [address] [facility], [address] [place] [address]   HISTORY AND PHYSICAL       Name:  Puja Hollingsworth   MR#:  [id number]   :  1965   Account #:  [de-identified]        Date of Adm:  2017       CHIEF COMPLAINT: Shortness of breath. HISTORY OF PRESENT ILLNESS: A 67-year-old white male with past   medical history of atrial fibrillation, type 2 diabetes mellitus (borderline),   obstructive sleep apnea, and morbid obesity presented to the   emergency department from [facility] with chief   complaint of shortness of breath. The patient reportedly had ongoing   symptoms over the past 2 weeks. At baseline he has sleep apnea, he   uses BiPAP at nighttime. He reportedly went to [facility] today after having worsening shortness of breath,   remained constant, aggravated with any slight movement or activity   without specific or alleviating factors. He complains of dry hacking   cough. He denies chest pain. He reports chronic abdominal pain   attributed to umbilical hernia. He also notes he has chronic leg edema   which is unchanged compared to his baseline. On arrival to the   emergency department initial reported vital signs were blood pressure   143/84, heart rate of 154, respiratory rate of 22, O2 saturations 95% on   room air. The patient remained tachycardiac in the emergency   department. He underwent a workup including chest x-ray portable   showing mild increased interstitial markings with cardiomegaly,   possible bronchitis and minimal congestive changes per radiology   report. CT abdomen and pelvis without contrast showed moderate right   small to moderate left pleural effusions with mild subcutaneous edema   throughout intra-abdominal wall. There was a small fat   containing umbilical hernia, no nephrolithiasis, no hydronephrosis and   normal appendix per the radiology report.  Twelve lead EKG shows   atrial fibrillation, nonspecific T wave changes 166 beats per minute. Per the ED the patient was given diltiazem 20 mg IV plus additional 10   mg IV injection bolus followed by diltiazem IV infusion. He was also   given Lasix 40 mg x1 dose. The patient is now seen for admission to   the hospitalist service for continued evaluation and treatment. The   patient does not complain of any dizziness, light headedness,   weakness, numbness, paresthesias, slurred speech, facial droop,   headache, neck pain, back pain, chest pain, hemoptysis, nausea,   vomiting, diarrhea, melena, dysuria, hematuria, calf pain, increased leg   swelling compared to baseline, fever, chills. Patient and his wife note   that the patient has gone on frequent long road trips to [place], [place] over the last few weeks. He does not report prior history of   PE/DVT. PAST MEDICAL HISTORY:    1. Atrial fibrillation. Reportedly diagnosed approximately 2 years ago,   no followup for past 18 months per his report. Formerly followed by Dr. Ann Wiggins. Last echocardiogram 12/03/2014 revealed left   ventricular ejection fraction of 55% to 60%. 2. Type 2 diabetes mellitus -borderline per patient's report. Not on any   medicines for the same. 3. Obstructive sleep apnea. Uses BiPAP at home at night time. 4. Morbid obesity. PAST SURGICAL HISTORY: None. MEDICATIONS: Currently not taking any medications. ALLERGIES: NO KNOWN DRUG ALLERGIES. SOCIAL HISTORY: Denies smoking or illicit drugs. Positive for   occasional alcohol. . FAMILY HISTORY: Heart attack - father. Heart disease - father. Stroke   - mother. REVIEW OF SYSTEMS: Eleven systems reviewed, pertinent positives   were as in HPI otherwise negative. PHYSICAL EXAMINATION   VITAL SIGNS: Temperature of 98.0 degrees Fahrenheit. Blood pressure   127/106, heart rate of 117, respiratory rate of 24, O2 saturation is 93%   on 1.5 liters of oxygen by nasal cannula.  Reported weight 350 pounds, 158.8 kg (reported height of 5 foot 9 inches tall). GENERAL: Morbidly obese male in mild respiratory distress, even with   slight activity with increased heart rate with change in position. PSYCH: Awake, alert x3. NEUROLOGIC: GCS of 15. Moves extremities x4. Sensation grossly   intact without pleurisy or facial droop. HEENT: Normocephalic, atraumatic. PERRLA. EOMs intact. Sclerae   anicteric. Conjunctivae clear. Nares are patent. Oropharynx - tongue is   midline, nonedematous. NECK: Supple without lymphadenopathy. JVD - no carotid bruits, no   thyromegaly. LYMPH: Negative for cervical, supraclavicular adenopathy. RESPIRATORY/LUNGS: Clear to auscultation but diminished bilateral   lung fields. GI: Abdomen is soft,generalized tenderness on palpation, mostly in the   left lower quadrant without rebound, guarding or rigidity. Normal active   bowel sounds, distended. No auscultated abdominal bruits. No   palpable abdominal mass. There is diffuse old appearing rash on the   abdominal wall with dry scaling skin. BACK: No CVA tenderness, no stepoff deformity. MUSCULOSKELETAL: No acute palpable bony deformity. Negative   for calf tenderness. VASCULAR: 2+ radial, 1+ dorsalis pedis pulses without cyanosis,   clubbing. Marked nonpitting edema bilateral lower extremities with   chronic venous stasis discoloration of both legs. SKIN: Warm and dry. LABORATORY DATA: Reviewed, as follows: Sodium 139, potassium   4.2, chloride 106, CO2 of 24, BUN 15, creatinine 1.6, glucose 126,   anion gap of 9. Calcium is 8.7. GFR is 57, total bilirubin is 1.2, total   protein is 7.7, albumin is 3.4. ALT 53, AST of 37, alkaline phosphatase   of 99, lipase of 169. CK total of 194, troponin I less than 0.04. WBC   11.8, hemoglobin 13.3, hematocrit of 41.1 and platelets are 611    CT abdomen and pelvis without contrast, results are reviewed and   noted in HPI. Chest x-ray portable - results are noted in HPI. EKG - results noted. IMPRESSION AND PLAN:    1. Atrial fibrillation - rate not controlled. Admit patient to ICU. Continue   patient's diltiazem IV infusion titrated to keep goal heart rate less than   100. Of concern, in addition to atrial fibrillation were possibility for   pulmonary embolism with pronounced tachycardia, tachypnea and   hypoxia with even the slightest movement. Unfortunately the patient   has elevated creatinine and as such have not ordered a CTA of the   chest, as had concerns for use of IV contrast. As well, nuclear   medicine VQ lung scan not ordered as patient has had difficulty with   lying supine for long periods and may not obtain optimal studies. Given   the fact that the patient is not on any anticoagulant therapy without any   absolute contraindications to the same with untreated atrial fibrillation,   would start patient on heparin IV infusion per protocol. Order stat PT   and PTT/INR. 2. Acute pulmonary edema. Continue workup and plan as noted above. Concern for possible congestive heart failure. The patient already has   been placed on Lasix; however, have to monitor it closely as patient   has elevated creatinine. Placed on strict I and O's, daily weights. 3. Acute congestive heart failure. In addition will order BNP. Order 2   echocardiograms for the a.m. Consult with cardiologist.   4. Leukocytosis. Repeat CBC and no defined source of infection. 5. Elevated creatinine. Repeat renal panel. Place on strict I and O's. No   IV fluids with noted concerns of congestive heart failure. 6. Anasarca edema. The plan as noted above. 7. Chronic generalized abdominal pain with history of umbilical hernia. Continue with supportive care. 8. Bilateral pleural effusions. Continue with pulse oximetry, oxygen   therapy. 9. Obstructive sleep apnea, order BiPAP. The settings will need to be   adjusted overnight if patient is unaware of his current settings.    10. Type 2 diabetes mellitus - order Humalog insulin correctional   coverage schedule. Accu Cheks in place, order for hemoglobin A1c.   11. Morbid obesity. The patient will need diet, exercise and weight   loss. 12. Venous thromboembolism prophylaxis - Patient will be on heparin. CODE STATUS - FULL CODE. PAUL Grewal MD MP / Scarlett Mckinney   D:  07/01/2017   21:14   T:  07/02/2017   08:50   Job #:  [id number]

## 2017-07-02 NOTE — PROGRESS NOTES
2015 TRANSFER - IN REPORT:    Verbal report received from [place] (name) on Ruy Goodman  being received from ED (unit) for routine progression of care      Report consisted of patients Situation, Background, Assessment and   Recommendations(SBAR). Information from the following report(s) SBAR, ED Summary and Cardiac Rhythm A fib 100-120s was reviewed with the receiving nurse. Opportunity for questions and clarification was provided. 2025 Assessment completed upon patients arrival to unit and care assumed. Primary Nurse Sharyn Mccormick RN and Jovany Ware RN performed a dual skin assessment on this patient   No impairment noted  Dhruv score is 22 Hyperpigmentation and dusky areas noted on shins along with a few scabs. Pt states this is d/t previous history of cellulitis. New PIV started. Heparin drip started at 9. Education and handout provided regarding AF and associated risks. 2130 Dr. Lydia Carlson at bedside talking to patient and wife. Pt's HR increased to 140s.   2200 To CT.  2240 Returned from CT.   2300 Page out to vascular tech for duplex.    [address] \"will be here when he can\"

## 2017-07-03 LAB
ANION GAP BLD CALC-SCNC: 8 MMOL/L (ref 5–15)
APTT PPP: 54.7 SEC (ref 22.1–32.5)
APTT PPP: 59.8 SEC (ref 22.1–32.5)
APTT PPP: 68.3 SEC (ref 22.1–32.5)
BUN SERPL-MCNC: 13 MG/DL (ref 6–20)
BUN/CREAT SERPL: 14 (ref 12–20)
CALCIUM SERPL-MCNC: 7 MG/DL (ref 8.5–10.1)
CHLORIDE SERPL-SCNC: 110 MMOL/L (ref 97–108)
CO2 SERPL-SCNC: 25 MMOL/L (ref 21–32)
CREAT SERPL-MCNC: 0.91 MG/DL (ref 0.7–1.3)
ERYTHROCYTE [DISTWIDTH] IN BLOOD BY AUTOMATED COUNT: 15.6 % (ref 11.5–14.5)
GLUCOSE BLD STRIP.AUTO-MCNC: 103 MG/DL (ref 65–100)
GLUCOSE BLD STRIP.AUTO-MCNC: 116 MG/DL (ref 65–100)
GLUCOSE BLD STRIP.AUTO-MCNC: 120 MG/DL (ref 65–100)
GLUCOSE BLD STRIP.AUTO-MCNC: 133 MG/DL (ref 65–100)
GLUCOSE SERPL-MCNC: 100 MG/DL (ref 65–100)
HCT VFR BLD AUTO: 34.5 % (ref 36.6–50.3)
HGB BLD-MCNC: 11 G/DL (ref 12.1–17)
MAGNESIUM SERPL-MCNC: 1.9 MG/DL (ref 1.6–2.4)
MAGNESIUM SERPL-MCNC: 2 MG/DL (ref 1.6–2.4)
MCH RBC QN AUTO: 25.6 PG (ref 26–34)
MCHC RBC AUTO-ENTMCNC: 31.9 G/DL (ref 30–36.5)
MCV RBC AUTO: 80.4 FL (ref 80–99)
PHOSPHATE SERPL-MCNC: 3.6 MG/DL (ref 2.6–4.7)
PLATELET # BLD AUTO: 235 K/UL (ref 150–400)
POTASSIUM SERPL-SCNC: 3.2 MMOL/L (ref 3.5–5.1)
POTASSIUM SERPL-SCNC: 3.9 MMOL/L (ref 3.5–5.1)
RBC # BLD AUTO: 4.29 M/UL (ref 4.1–5.7)
SERVICE CMNT-IMP: ABNORMAL
SODIUM SERPL-SCNC: 143 MMOL/L (ref 136–145)
THERAPEUTIC RANGE,PTTT: ABNORMAL SECS (ref 58–77)
WBC # BLD AUTO: 8.9 K/UL (ref 4.1–11.1)

## 2017-07-03 PROCEDURE — 74011250637 HC RX REV CODE- 250/637: Performed by: INTERNAL MEDICINE

## 2017-07-03 PROCEDURE — 93041 RHYTHM ECG TRACING: CPT

## 2017-07-03 PROCEDURE — 5A2204Z RESTORATION OF CARDIAC RHYTHM, SINGLE: ICD-10-PCS | Performed by: INTERNAL MEDICINE

## 2017-07-03 PROCEDURE — 74011250636 HC RX REV CODE- 250/636: Performed by: SPECIALIST

## 2017-07-03 PROCEDURE — 84100 ASSAY OF PHOSPHORUS: CPT | Performed by: HOSPITALIST

## 2017-07-03 PROCEDURE — 94660 CPAP INITIATION&MGMT: CPT

## 2017-07-03 PROCEDURE — 84132 ASSAY OF SERUM POTASSIUM: CPT | Performed by: INTERNAL MEDICINE

## 2017-07-03 PROCEDURE — 85027 COMPLETE CBC AUTOMATED: CPT | Performed by: HOSPITALIST

## 2017-07-03 PROCEDURE — 83735 ASSAY OF MAGNESIUM: CPT | Performed by: INTERNAL MEDICINE

## 2017-07-03 PROCEDURE — 74011250637 HC RX REV CODE- 250/637: Performed by: SPECIALIST

## 2017-07-03 PROCEDURE — 83735 ASSAY OF MAGNESIUM: CPT | Performed by: HOSPITALIST

## 2017-07-03 PROCEDURE — 65620000000 HC RM CCU GENERAL

## 2017-07-03 PROCEDURE — 92960 CARDIOVERSION ELECTRIC EXT: CPT

## 2017-07-03 PROCEDURE — B246ZZ4 ULTRASONOGRAPHY OF RIGHT AND LEFT HEART, TRANSESOPHAGEAL: ICD-10-PCS | Performed by: INTERNAL MEDICINE

## 2017-07-03 PROCEDURE — 77030018729 HC ELECTRD DEFIB PAD CARD -B

## 2017-07-03 PROCEDURE — 74011250637 HC RX REV CODE- 250/637: Performed by: HOSPITALIST

## 2017-07-03 PROCEDURE — 82962 GLUCOSE BLOOD TEST: CPT

## 2017-07-03 PROCEDURE — 74011000258 HC RX REV CODE- 258: Performed by: HOSPITALIST

## 2017-07-03 PROCEDURE — 93325 DOPPLER ECHO COLOR FLOW MAPG: CPT

## 2017-07-03 PROCEDURE — 74011250636 HC RX REV CODE- 250/636: Performed by: INTERNAL MEDICINE

## 2017-07-03 PROCEDURE — 36415 COLL VENOUS BLD VENIPUNCTURE: CPT | Performed by: HOSPITALIST

## 2017-07-03 PROCEDURE — 80048 BASIC METABOLIC PNL TOTAL CA: CPT | Performed by: HOSPITALIST

## 2017-07-03 PROCEDURE — 74011250636 HC RX REV CODE- 250/636: Performed by: HOSPITALIST

## 2017-07-03 PROCEDURE — 85730 THROMBOPLASTIN TIME PARTIAL: CPT | Performed by: FAMILY MEDICINE

## 2017-07-03 RX ORDER — METOPROLOL SUCCINATE 50 MG/1
50 TABLET, EXTENDED RELEASE ORAL DAILY
Status: DISCONTINUED | OUTPATIENT
Start: 2017-07-04 | End: 2017-07-05 | Stop reason: HOSPADM

## 2017-07-03 RX ORDER — POTASSIUM CHLORIDE 750 MG/1
40 TABLET, FILM COATED, EXTENDED RELEASE ORAL
Status: COMPLETED | OUTPATIENT
Start: 2017-07-03 | End: 2017-07-03

## 2017-07-03 RX ORDER — ATROPINE SULFATE 0.1 MG/ML
0.5 INJECTION INTRAVENOUS AS NEEDED
Status: DISCONTINUED | OUTPATIENT
Start: 2017-07-03 | End: 2017-07-03

## 2017-07-03 RX ORDER — POTASSIUM CHLORIDE 750 MG/1
40 TABLET, FILM COATED, EXTENDED RELEASE ORAL DAILY
Status: COMPLETED | OUTPATIENT
Start: 2017-07-04 | End: 2017-07-05

## 2017-07-03 RX ORDER — SODIUM CHLORIDE 0.9 % (FLUSH) 0.9 %
5 SYRINGE (ML) INJECTION AS NEEDED
Status: DISCONTINUED | OUTPATIENT
Start: 2017-07-03 | End: 2017-07-03

## 2017-07-03 RX ORDER — DOFETILIDE 0.5 MG/1
500 CAPSULE ORAL EVERY 12 HOURS
Status: DISCONTINUED | OUTPATIENT
Start: 2017-07-03 | End: 2017-07-04

## 2017-07-03 RX ORDER — POTASSIUM CHLORIDE 7.45 MG/ML
10 INJECTION INTRAVENOUS
Status: COMPLETED | OUTPATIENT
Start: 2017-07-03 | End: 2017-07-03

## 2017-07-03 RX ORDER — FENTANYL CITRATE 50 UG/ML
25-200 INJECTION, SOLUTION INTRAMUSCULAR; INTRAVENOUS
Status: DISCONTINUED | OUTPATIENT
Start: 2017-07-03 | End: 2017-07-03

## 2017-07-03 RX ORDER — MIDAZOLAM HYDROCHLORIDE 1 MG/ML
.5-1 INJECTION, SOLUTION INTRAMUSCULAR; INTRAVENOUS
Status: DISCONTINUED | OUTPATIENT
Start: 2017-07-03 | End: 2017-07-03

## 2017-07-03 RX ORDER — LISINOPRIL 10 MG/1
10 TABLET ORAL DAILY
Status: DISCONTINUED | OUTPATIENT
Start: 2017-07-04 | End: 2017-07-05 | Stop reason: HOSPADM

## 2017-07-03 RX ORDER — LANOLIN ALCOHOL/MO/W.PET/CERES
400 CREAM (GRAM) TOPICAL 2 TIMES DAILY
Status: DISCONTINUED | OUTPATIENT
Start: 2017-07-03 | End: 2017-07-05 | Stop reason: HOSPADM

## 2017-07-03 RX ADMIN — MIDAZOLAM HYDROCHLORIDE 2 MG: 1 INJECTION, SOLUTION INTRAMUSCULAR; INTRAVENOUS at 16:36

## 2017-07-03 RX ADMIN — CALCIUM GLUCONATE 1 G: 94 INJECTION, SOLUTION INTRAVENOUS at 11:12

## 2017-07-03 RX ADMIN — METOPROLOL TARTRATE 25 MG: 25 TABLET ORAL at 07:30

## 2017-07-03 RX ADMIN — FUROSEMIDE 40 MG: 10 INJECTION, SOLUTION INTRAMUSCULAR; INTRAVENOUS at 08:24

## 2017-07-03 RX ADMIN — MIDAZOLAM HYDROCHLORIDE 3 MG: 1 INJECTION, SOLUTION INTRAMUSCULAR; INTRAVENOUS at 16:34

## 2017-07-03 RX ADMIN — DOFETILIDE 500 MCG: 0.5 CAPSULE ORAL at 19:57

## 2017-07-03 RX ADMIN — POTASSIUM CHLORIDE 10 MEQ: 10 INJECTION, SOLUTION INTRAVENOUS at 13:43

## 2017-07-03 RX ADMIN — DILTIAZEM HYDROCHLORIDE 180 MG: 180 CAPSULE, COATED, EXTENDED RELEASE ORAL at 08:24

## 2017-07-03 RX ADMIN — Medication 400 MG: at 19:14

## 2017-07-03 RX ADMIN — Medication 400 MG: at 13:46

## 2017-07-03 RX ADMIN — Medication 10 ML: at 05:18

## 2017-07-03 RX ADMIN — Medication 10 ML: at 21:37

## 2017-07-03 RX ADMIN — HEPARIN SODIUM AND DEXTROSE 11 UNITS/KG/HR: 10000; 5 INJECTION INTRAVENOUS at 05:16

## 2017-07-03 RX ADMIN — METOPROLOL TARTRATE 25 MG: 25 TABLET ORAL at 13:46

## 2017-07-03 RX ADMIN — POTASSIUM CHLORIDE 10 MEQ: 10 INJECTION, SOLUTION INTRAVENOUS at 11:12

## 2017-07-03 RX ADMIN — APIXABAN 5 MG: 5 TABLET, FILM COATED ORAL at 21:33

## 2017-07-03 RX ADMIN — MIDAZOLAM HYDROCHLORIDE 2 MG: 1 INJECTION, SOLUTION INTRAMUSCULAR; INTRAVENOUS at 16:43

## 2017-07-03 RX ADMIN — POTASSIUM CHLORIDE 40 MEQ: 750 TABLET, FILM COATED, EXTENDED RELEASE ORAL at 11:11

## 2017-07-03 RX ADMIN — MIDAZOLAM HYDROCHLORIDE 2 MG: 1 INJECTION, SOLUTION INTRAMUSCULAR; INTRAVENOUS at 16:45

## 2017-07-03 RX ADMIN — FENTANYL CITRATE 50 MCG: 50 INJECTION, SOLUTION INTRAMUSCULAR; INTRAVENOUS at 16:43

## 2017-07-03 RX ADMIN — FENTANYL CITRATE 50 MCG: 50 INJECTION, SOLUTION INTRAMUSCULAR; INTRAVENOUS at 16:34

## 2017-07-03 NOTE — PROCEDURES
CARDIOVERSION  Procedure date: 7/3/2017     PROCEDURE:   Electrical synchronized cardioversions of atrial fibrillation. BRIEF HISTORY:  This is a 49 y. o.man with the history of persistent atrial fibrillation with rapid ventricular rate morbid obesity and LVEF has dropped. He presented with acute systolic CHF NYHA class 3 and despite cardizem IV his ventricular rate was still fast at times. He is on heparin and DESTINEE does not show thrombus. The risks and benefits have been discussed with him and he agreed to proceed. PROCEDURE IN DETAIL:   The patient is now in the supine position and the pads were applied in the anterior posterior direction. She was given additional Versed, fentanyl after a DESTINEE. Thereafter the conscious sedation was maintained and 200 joule biphasic shock was delivered in anterior posterior direction and did not convert her to sinus rhythm. Another 360 joule shock was then delivered in the anterior-posterior direction and converted to sinus rhythm . The patient was then arousable. COMPLICATIONS:  None.     Specimen removed: NONE  ASSESSMENT AND PLAN:  The patient will be kept in hospital for eliquis and tikosyn with CHF GDMT  Follow up outpatient to re determine LVEF if he can maintain NSR

## 2017-07-03 NOTE — INTERDISCIPLINARY ROUNDS
IDR/SLIDR Summary          Patient: Erin Del Cid MRN: [id number]    Age: 49 y.o. YOB: 1965 Room/Bed: [address] [place] [address]   Admit Diagnosis: A-fib (HCC)  Anasarca  Acute chest pain  Pleural effusion, bilateral  Principal Diagnosis: A-fib (HCC)   Goals: HR control and anticoagulate   Readmission: NO  Quality Measure: CHF  VTE Prophylaxis: Mechanical and Chemical  Influenza Vaccine screening completed? YES  Pneumococcal Vaccine screening completed? YES  Mobility needs: Yes   Nutrition plan:Yes  Consults:P.T, O.T. and Case Management    Financial concerns:Yes  Escalated to CM? YES  RRAT Score: 9     [facility]   Testing due for pt today?  NO  LOS: 2 days Expected length of stay 5 days  Discharge plan: home   PCP: Pineda Perales MD  Transportation needs: No    Days before discharge:two or more days before discharge   Discharge disposition: Home    Signed:     Brooklyn Culp RN  7/3/2017  4:42 AM

## 2017-07-03 NOTE — PROGRESS NOTES
Bedside and Verbal shift change report given to Mari Cobian (oncoming nurse) by Marielena Morales (offgoing nurse). Report included the following information SBAR, Kardex, ED Summary, OR Summary, Procedure Summary, Intake/Output, MAR, Accordion, Recent Results, Med Rec Status, Cardiac Rhythm Afib/Afib RVR and Alarm Parameters . No events overnight, wore BiPAP, titrated heparin gtt to 11 units next aPTT draw at 11am 7/3      Bedside and Verbal shift change report given to Ximena Olson (oncoming nurse) by Mari Cobian (offgoing nurse). Report included the following information SBAR, Kardex, ED Summary, OR Summary, Procedure Summary, Intake/Output, MAR, Accordion, Recent Results, Med Rec Status, Cardiac Rhythm Afib and Alarm Parameters .

## 2017-07-03 NOTE — PROGRESS NOTES
TRANSFER - OUT REPORT:    Verbal report given to [place] on Alana Tipton being transferred to CCU for routine progression of care       Report consisted of patients Situation, Background, Assessment and   Recommendations(SBAR). Information from the following report(s) SBAR, Procedure Summary and MAR was reviewed with the receiving nurse. Opportunity for questions and clarification was provided.

## 2017-07-03 NOTE — PROGRESS NOTES
Cardiology Progress Note         7/3/2017 4:16 PM    Admit Date: 7/1/2017    Admit Diagnosis: A-fib ([address] Anasarca;Acute chest pain;Pleural effusion, loki*      Subjective: Ernesto Spicer is still in AFIB RVR  + SOB  He does not yet want ablation  He stopped meds because he thought he will not be back in AFIB and they made him dizzy    Past Medical History:   Diagnosis Date    Arrhythmia     Sleep apnea      No past surgery  Social History     Social History    Marital status:      Spouse name: N/A    Number of children: N/A    Years of education: N/A     Occupational History    Not on file.      Social History Main Topics    Smoking status: Never Smoker    Smokeless tobacco: Former User    Alcohol use No      Comment: Occassionally    Drug use: No    Sexual activity: Not on file     Other Topics Concern    Not on file     Social History Narrative         Visit Vitals    /65 (BP 1 Location: Left arm, BP Patient Position: At rest)    Pulse 99    Temp 98.2 °F (36.8 °C)    Resp 24    Ht 5' 9\" (1.753 m)    Wt 345 lb 14.4 oz (156.9 kg)    SpO2 99%    BMI 51.08 kg/m2     Current Facility-Administered Medications   Medication Dose Route Frequency    magnesium oxide (MAG-OX) tablet 400 mg  400 mg Oral BID    [START ON 7/4/2017] potassium chloride SR (KLOR-CON 10) tablet 40 mEq  40 mEq Oral DAILY    dilTIAZem CD (CARDIZEM CD) capsule 180 mg  180 mg Oral DAILY    insulin lispro (HUMALOG) injection   SubCUTAneous AC&HS    glucose chewable tablet 16 g  4 Tab Oral PRN    glucagon (GLUCAGEN) injection 1 mg  1 mg IntraMUSCular PRN    heparin 25,000 units in D5W 250 ml infusion  6-25 Units/kg/hr (Order-Specific) IntraVENous TITRATE    dextrose 10 % infusion 125-250 mL  125-250 mL IntraVENous PRN    dilTIAZem (CARDIZEM) 125 mg in dextrose 5% 125 mL infusion  0-15 mg/hr IntraVENous TITRATE    sodium chloride (NS) flush 5-10 mL  5-10 mL IntraVENous Q8H    sodium chloride (NS) flush 5-10 mL  5-10 mL IntraVENous PRN    metoprolol tartrate (LOPRESSOR) tablet 25 mg  25 mg Oral Q8H    furosemide (LASIX) injection 40 mg  40 mg IntraVENous DAILY         Objective:      Physical Exam:  Visit Vitals    /65 (BP 1 Location: Left arm, BP Patient Position: At rest)    Pulse 99    Temp 98.2 °F (36.8 °C)    Resp 24    Ht 5' 9\" (1.753 m)    Wt 345 lb 14.4 oz (156.9 kg)    SpO2 99%    BMI 51.08 kg/m2     General Appearance:  Well developed, well nourished,alert and oriented x 3, and individual in no acute distress. Ears/Nose/Mouth/Throat:   Hearing grossly normal.         Neck: Supple. Chest:   Lungs clear to auscultation bilaterally. Cardiovascular:  irregular rhythm, no murmur. Abdomen:   Soft, morbid obesity   Extremities: 2+ edema bilaterally. Skin: Warm and dry. Data Review:   Labs:  Recent Results (from the past 24 hour(s))   GLUCOSE, POC    Collection Time: 07/02/17  4:47 PM   Result Value Ref Range    Glucose (POC) 115 (H) 65 - 100 mg/dL    Performed by Day Horne    PTT    Collection Time: 07/02/17  7:05 PM   Result Value Ref Range    aPTT 54.5 (H) 22.1 - 32.5 sec    aPTT, therapeutic range     58.0 - 77.0 SECS   GLUCOSE, POC    Collection Time: 07/02/17  9:20 PM   Result Value Ref Range    Glucose (POC) 117 (H) 65 - 100 mg/dL    Performed by Ashlee Paez    PTT    Collection Time: 07/03/17  3:34 AM   Result Value Ref Range    aPTT 54.7 (H) 22.1 - 32.5 sec    aPTT, therapeutic range     58.0 - 77.0 SECS   CBC W/O DIFF    Collection Time: 07/03/17  3:34 AM   Result Value Ref Range    WBC 8.9 4.1 - 11.1 K/uL    RBC 4.29 4. 10 - 5.70 M/uL    HGB 11.0 (L) 12.1 - 17.0 g/dL    HCT 34.5 (L) 36.6 - 50.3 %    MCV 80.4 80.0 - 99.0 FL    MCH 25.6 (L) 26.0 - 34.0 PG    MCHC 31.9 30.0 - 36.5 g/dL    RDW 15.6 (H) 11.5 - 14.5 %    PLATELET 374 603 - 697 K/uL   METABOLIC PANEL, BASIC    Collection Time: 07/03/17  3:34 AM   Result Value Ref Range    Sodium 143 136 - 145 mmol/L    Potassium 3.2 (L) 3.5 - 5.1 mmol/L    Chloride 110 (H) 97 - 108 mmol/L    CO2 25 21 - 32 mmol/L    Anion gap 8 5 - 15 mmol/L    Glucose 100 65 - 100 mg/dL    BUN 13 6 - 20 MG/DL    Creatinine 0.91 0.70 - 1.30 MG/DL    BUN/Creatinine ratio 14 12 - 20      GFR est AA >60 >60 ml/min/1.73m2    GFR est non-AA >60 >60 ml/min/1.73m2    Calcium 7.0 (L) 8.5 - 10.1 MG/DL   MAGNESIUM    Collection Time: 07/03/17  3:34 AM   Result Value Ref Range    Magnesium 1.9 1.6 - 2.4 mg/dL   PHOSPHORUS    Collection Time: 07/03/17  3:34 AM   Result Value Ref Range    Phosphorus 3.6 2.6 - 4.7 MG/DL   GLUCOSE, POC    Collection Time: 07/03/17  7:27 AM   Result Value Ref Range    Glucose (POC) 120 (H) 65 - 100 mg/dL    Performed by Kellen Almonte    PTT    Collection Time: 07/03/17 11:15 AM   Result Value Ref Range    aPTT 59.8 (H) 22.1 - 32.5 sec    aPTT, therapeutic range     58.0 - 77.0 SECS   GLUCOSE, POC    Collection Time: 07/03/17 11:16 AM   Result Value Ref Range    Glucose (POC) 116 (H) 65 - 100 mg/dL    Performed by SEGUN ZHNAG        Telemetry: AFIB RVR      Assessment:     Principal Problem:    A-fib ([address] (7/1/2017)    Active Problems:    Anasarca (7/1/2017)      Pleural effusion, bilateral (9/5/1852)    acute systolic CHF NYHA class 3    Plan:   Replace potassium  Continue with heparin  He agrees with DESTINEE and cardioversion and maintain NSR with tikosyn  He did not like possible side effects of amiodarone  He is not ready to agree to ablation  Optimize GDMT for systolic dysfunction but this could be due to tachycardia  I discussed with Dr Hanny Boyce, his attending this week  Aubrey Naranjo M.D.  [facility]  Electrophysiology/Cardiology  [facility]  [address], [place] [address], [address]  [address]  [phone]

## 2017-07-03 NOTE — PROGRESS NOTES
Cardiac Cath Lab Procedure Area Arrival Note:    Alana Tipton arrived to Cardiac Cath Lab, Procedure Area. Patient identifiers verified with NAME and DATE OF BIRTH. Procedure verified with patient. Consent forms verified. Allergies verified. Patient informed of procedure and plan of care. Questions answered with review. Patient voiced understanding of procedure and plan of care. Patient on cardiac monitor, non-invasive blood pressure, SPO2 monitor. On Roomair or O2 @ 2 lpm via NC.  IV of 0.9ns on pump at 25 ml/hr. Patient status doing well without problems. Patient is A&Ox 3. Patient reports no s/s of discomfort. Patient medicated during procedure with orders obtained and verified by Dr. Mp Lux. Refer to patients Cardiac Cath Lab PROCEDURE REPORT for vital signs, assessment, status, and response during procedure, printed at end of case. Printed report on chart or scanned into chart.

## 2017-07-03 NOTE — PROGRESS NOTES
Problem: Impaired Skin Integrity/Pressure Injury Treatment  Goal: *Prevention of pressure ulcer  Outcome: Progressing Towards Goal  Turns Self

## 2017-07-03 NOTE — PROGRESS NOTES
0730 Bedside and Verbal shift change report given to Vianney Meza (oncoming nurse) by James Bruce (offgoing nurse). Report included the following information SBAR, Kardex, ED Summary, Procedure Summary, Intake/Output, MAR, Accordion and Recent Results. 65 Dr. Beau Mckeon and Dr. Mari Fine @ bedside    1100 Plan for DESTINEE and cardioversion around 1500    1200 PTT Therapeutic, no rate change    1540 Cath Lab @ bedside to take pt down for DESTINEE/cardioversion. 1650 TRANSFER - IN REPORT:    Verbal report received from Caryl(name) on Carlos Flaherty  being received from Cath Lab(unit) for routine progression of care      Report consisted of patients Situation, Background, Assessment and   Recommendations(SBAR). Information from the following report(s) SBAR, Kardex, Procedure Summary, Intake/Output, MAR, Accordion and Recent Results was reviewed with the receiving nurse. Opportunity for questions and clarification was provided. Assessment completed upon patients arrival to unit and care assumed. 1730 Pt arrived to unit via bed form Cath Lab.    1745 Dr. Beau Mckeon @ bedside speaking with family, orders received. 1850 PTT Therapeutic, no rate change. 2nd therapeutic PTT. 1930 Bedside and Verbal shift change report given to Lety (oncoming nurse) by Vianney Meza (offgoing nurse). Report included the following information SBAR, Kardex, ED Summary, Procedure Summary, Intake/Output, MAR, Accordion and Recent Results.

## 2017-07-03 NOTE — PROCEDURES
Cardiac Procedure Note   Patient: Lois Persaud  MRN: [id number]  SSN: [id number]   YOB: 1965 Age: 49 y.o.   Sex: male    Date of Procedure: 7/3/2017   Pre-procedure Diagnosis: acute systolic CHF NYHA 3, atrial fibrillation RVR morbid obesity  Post-procedure Diagnosis: same  Procedure: DESTINEE and Cardioversion  :  Dr. Raeann Jara MD    Assistant(s):  None  Anesthesia: Moderate Sedation   Estimated Blood Loss: none  Specimens Removed: None  Findings: global severe hypokinesis  LVEF 20%  No LA thrombus, mild MR and TR  Cardioversion 211 J to NSR  Complications: None   Implants:  None  Signed by:  Raeann Jara MD  7/3/2017  5:11 PM

## 2017-07-03 NOTE — PROGRESS NOTES
Cardiac Cath Lab Recovery Arrival Note:      Michelle Santamaria arrived to Cardiac Cath Lab, Recovery Area. Staff introduced to patient. Patient identifiers verified with NAME and DATE OF BIRTH. Procedure verified with patient. Consent forms reviewed and signed by patient or authorized representative and verified. Allergies verified. Patient and family oriented to department. Patient and family informed of procedure and plan of care. Questions answered with review. Patient prepped for procedure, per orders from physician, prior to arrival.    Patient on cardiac monitor, non-invasive blood pressure, SPO2 monitor. On RA. Patient is A&Ox 4. Patient reports no pain. Patient in stretcher, in low position, with side rails up, call bell within reach, patient instructed to call if assistance as needed. Patient prep in: [address], [place] [address]. Patient family has pager #   Family in: . Prep by: PASQUALE Golden RN

## 2017-07-03 NOTE — NURSE NAVIGATOR
Chart reviewed by Heart Failure Nurse Navigator. Heart Failure database completed. EF 35%. ACEi/ARB:Contraindicated due to low blood pressure. BB: Lopressor 25 mg. On Toprol XL 50 mg . CRT Not indicated. NYHA Functional Class III. Heart Failure Teach Back in Patient Education. Heart Failure Avoiding Triggers on Discharge Instructions.       Outpatient nurse navigator notified of admission

## 2017-07-03 NOTE — PROGRESS NOTES
Hospitalist Progress Note      Hospital summary: 49 y.o man with afib, DM, GEE, morbid obesity, who presented with shortness of breath. He was found to be in afib w/ rvr 7/1/2017. Assessment/Plan:  Paroxysmal afib: (POA) w/ rvr on presentation  -transitioning from IV to PO diltiazem and PO metoprolol  -continue heparin drip  -d/w Dr. 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 BNP only 132 but this is unreliable in obese patients  -continue IV furosemide    Hypokalemia: due to diuresis  -replete; goal >4    Hypocalcemia: replete    Acute renal insufficiency: (POA) Cr 0.95->1.33, suspect cardiorenal  -improved with diuresis    GEE: (POA)  -continue home BiPAP    Type II DM: hgb a1c 6.7%    Morbid obesity    Code status: full  DVT prophylaxis: heparin drip  Disposition: home when medically appropriate  ----------------------------------------------    CC: shortness of breath     S: dyspnea is much improved, leg edema improved, no chest pain, no palpitations or dizziness, still not at baseline. No n/v/d. Review of Systems:  Pertinent items are noted in HPI.     O:  Visit Vitals    /66 (BP 1 Location: Left arm, BP Patient Position: At rest)    Pulse (!) 115    Temp 98.2 °F (36.8 °C)    Resp 20    Ht 5' 9\" (1.753 m)    Wt 156.9 kg (345 lb 14.4 oz)    SpO2 98%    BMI 51.08 kg/m2       PHYSICAL EXAM:  Gen: NAD, non-toxic  HEENT: anicteric sclerae, normal conjunctiva, MM moist  Neck: supple, thick neck  Heart: irreg irreg, tachycardic, no MRG, unable to assess JVD due to obesity, 1+ BLE edema  Lungs: CTA b/l, diminished breath sounds globally, non-labored respirations  Abd: soft, NT, distended, BS+  Extr: warm  Skin: dry, mild chronic venous stasis changes  Neuro: CN II-XII grossly intact, normal speech, moves all extremities  Psych: normal mood, appropriate affect      Intake/Output Summary (Last 24 hours) at 07/03/17 1241  Last data filed at 07/03/17 1048   Gross per 24 hour   Intake           432.37 ml   Output             1725 ml   Net         -1292.63 ml        Recent labs & imaging reviewed:  Recent Labs      07/03/17   0334  07/02/17   0310   WBC  8.9  11.2*   HGB  11.0*  12.5   HCT  34.5*  38.4   PLT  235  245     Recent Labs      07/03/17   0334  07/02/17   0310  07/01/17   1738   NA  143  139  139   K  3.2*  3.6  4.2   CL  110*  105  106   CO2  25  25  24   BUN  13  14  15   CREA  0.91  1.04  1.33*   GLU  100  108*  126*   CA  7.0*  8.6  8.7   MG  1.9   --    --    PHOS  3.6   --    --      Recent Labs      07/01/17   1738   SGOT  37   ALT  53   AP  99   TBILI  1.2*   TP  7.7   ALB  3.4*   GLOB  4.3*   LPSE  169     Recent Labs      07/03/17   1115  07/03/17   0334  07/02/17   1905   07/01/17   2047   INR   --    --    --    --   1.1   PTP   --    --    --    --   11.5*   APTT  59.8*  54.7*  54.5*   < >  26.6    < > = values in this interval not displayed.         Recent Labs      07/01/17   1738   TROIQ  <0.04     Lab Results   Component Value Date/Time    Cholesterol, total 147 07/02/2017 03:10 AM    HDL Cholesterol 36 07/02/2017 03:10 AM    LDL, calculated 88 07/02/2017 03:10 AM    Triglyceride 115 07/02/2017 03:10 AM    CHOL/HDL Ratio 4.1 07/02/2017 03:10 AM     Lab Results   Component Value Date/Time    Glucose (POC) 116 07/03/2017 11:16 AM    Glucose (POC) 120 07/03/2017 07:27 AM    Glucose (POC) 117 07/02/2017 09:20 PM    Glucose (POC) 115 07/02/2017 04:47 PM     No results found for: COLOR, APPRN, SPGRU, REFSG, NEIL, PROTU, GLUCU, KETU, BILU, UROU, EULALIA, LEUKU, GLUKE, EPSU, BACTU, WBCU, RBCU, CASTS, UCRY    Med list reviewed  Current Facility-Administered Medications   Medication Dose Route Frequency    potassium chloride 10 mEq in 100 ml IVPB  10 mEq IntraVENous Q1H    magnesium oxide (MAG-OX) tablet 400 mg  400 mg Oral BID    [START ON 7/4/2017] potassium chloride SR (KLOR-CON 10) tablet 40 mEq  40 mEq Oral DAILY    dilTIAZem CD (CARDIZEM CD) capsule 180 mg  180 mg Oral DAILY    insulin lispro (HUMALOG) injection   SubCUTAneous AC&HS    glucose chewable tablet 16 g  4 Tab Oral PRN    glucagon (GLUCAGEN) injection 1 mg  1 mg IntraMUSCular PRN    heparin 25,000 units in D5W 250 ml infusion  6-25 Units/kg/hr (Order-Specific) IntraVENous TITRATE    dextrose 10 % infusion 125-250 mL  125-250 mL IntraVENous PRN    dilTIAZem (CARDIZEM) 125 mg in dextrose 5% 125 mL infusion  0-15 mg/hr IntraVENous TITRATE    sodium chloride (NS) flush 5-10 mL  5-10 mL IntraVENous Q8H    sodium chloride (NS) flush 5-10 mL  5-10 mL IntraVENous PRN    metoprolol tartrate (LOPRESSOR) tablet 25 mg  25 mg Oral Q8H    furosemide (LASIX) injection 40 mg  40 mg IntraVENous DAILY       Care Plan discussed with:  Patient/Family, Nurse and Consultant Dr. Gilford Peer, MD  Internal Medicine  Date of Service: 7/3/2017

## 2017-07-04 LAB
ANION GAP BLD CALC-SCNC: 6 MMOL/L (ref 5–15)
ATRIAL RATE: 73 BPM
BUN SERPL-MCNC: 14 MG/DL (ref 6–20)
BUN/CREAT SERPL: 13 (ref 12–20)
CALCIUM SERPL-MCNC: 8.6 MG/DL (ref 8.5–10.1)
CALCULATED P AXIS, ECG09: -5 DEGREES
CALCULATED R AXIS, ECG10: 37 DEGREES
CALCULATED T AXIS, ECG11: 9 DEGREES
CHLORIDE SERPL-SCNC: 103 MMOL/L (ref 97–108)
CO2 SERPL-SCNC: 27 MMOL/L (ref 21–32)
CREAT SERPL-MCNC: 1.06 MG/DL (ref 0.7–1.3)
DIAGNOSIS, 93000: NORMAL
GLUCOSE BLD STRIP.AUTO-MCNC: 103 MG/DL (ref 65–100)
GLUCOSE BLD STRIP.AUTO-MCNC: 107 MG/DL (ref 65–100)
GLUCOSE BLD STRIP.AUTO-MCNC: 108 MG/DL (ref 65–100)
GLUCOSE BLD STRIP.AUTO-MCNC: 128 MG/DL (ref 65–100)
GLUCOSE SERPL-MCNC: 99 MG/DL (ref 65–100)
MAGNESIUM SERPL-MCNC: 2.3 MG/DL (ref 1.6–2.4)
P-R INTERVAL, ECG05: 160 MS
PHOSPHATE SERPL-MCNC: 4 MG/DL (ref 2.6–4.7)
POTASSIUM SERPL-SCNC: 3.8 MMOL/L (ref 3.5–5.1)
Q-T INTERVAL, ECG07: 430 MS
QRS DURATION, ECG06: 96 MS
QTC CALCULATION (BEZET), ECG08: 473 MS
SERVICE CMNT-IMP: ABNORMAL
SODIUM SERPL-SCNC: 136 MMOL/L (ref 136–145)
VENTRICULAR RATE, ECG03: 73 BPM

## 2017-07-04 PROCEDURE — 74011250637 HC RX REV CODE- 250/637: Performed by: INTERNAL MEDICINE

## 2017-07-04 PROCEDURE — 84100 ASSAY OF PHOSPHORUS: CPT | Performed by: HOSPITALIST

## 2017-07-04 PROCEDURE — 93041 RHYTHM ECG TRACING: CPT

## 2017-07-04 PROCEDURE — 80048 BASIC METABOLIC PNL TOTAL CA: CPT | Performed by: HOSPITALIST

## 2017-07-04 PROCEDURE — 65620000000 HC RM CCU GENERAL

## 2017-07-04 PROCEDURE — 83735 ASSAY OF MAGNESIUM: CPT | Performed by: HOSPITALIST

## 2017-07-04 PROCEDURE — 74011250636 HC RX REV CODE- 250/636: Performed by: SPECIALIST

## 2017-07-04 PROCEDURE — 36415 COLL VENOUS BLD VENIPUNCTURE: CPT | Performed by: HOSPITALIST

## 2017-07-04 PROCEDURE — 82962 GLUCOSE BLOOD TEST: CPT

## 2017-07-04 RX ORDER — SODIUM CHLORIDE 0.9 % (FLUSH) 0.9 %
5-10 SYRINGE (ML) INJECTION EVERY 8 HOURS
Status: DISCONTINUED | OUTPATIENT
Start: 2017-07-04 | End: 2017-07-05 | Stop reason: HOSPADM

## 2017-07-04 RX ORDER — SODIUM CHLORIDE 0.9 % (FLUSH) 0.9 %
5-10 SYRINGE (ML) INJECTION AS NEEDED
Status: DISCONTINUED | OUTPATIENT
Start: 2017-07-04 | End: 2017-07-05 | Stop reason: HOSPADM

## 2017-07-04 RX ORDER — DOFETILIDE 0.25 MG/1
250 CAPSULE ORAL EVERY 12 HOURS
Status: DISCONTINUED | OUTPATIENT
Start: 2017-07-04 | End: 2017-07-05 | Stop reason: HOSPADM

## 2017-07-04 RX ORDER — NALOXONE HYDROCHLORIDE 0.4 MG/ML
0.4 INJECTION, SOLUTION INTRAMUSCULAR; INTRAVENOUS; SUBCUTANEOUS AS NEEDED
Status: DISCONTINUED | OUTPATIENT
Start: 2017-07-04 | End: 2017-07-05 | Stop reason: HOSPADM

## 2017-07-04 RX ADMIN — LISINOPRIL 10 MG: 10 TABLET ORAL at 08:33

## 2017-07-04 RX ADMIN — FUROSEMIDE 40 MG: 10 INJECTION, SOLUTION INTRAMUSCULAR; INTRAVENOUS at 08:33

## 2017-07-04 RX ADMIN — METOPROLOL SUCCINATE 50 MG: 50 TABLET, EXTENDED RELEASE ORAL at 08:33

## 2017-07-04 RX ADMIN — APIXABAN 5 MG: 5 TABLET, FILM COATED ORAL at 08:33

## 2017-07-04 RX ADMIN — Medication 400 MG: at 18:41

## 2017-07-04 RX ADMIN — Medication 10 ML: at 06:51

## 2017-07-04 RX ADMIN — Medication 10 ML: at 09:00

## 2017-07-04 RX ADMIN — POTASSIUM CHLORIDE 40 MEQ: 750 TABLET, FILM COATED, EXTENDED RELEASE ORAL at 08:33

## 2017-07-04 RX ADMIN — Medication 10 ML: at 21:34

## 2017-07-04 RX ADMIN — APIXABAN 5 MG: 5 TABLET, FILM COATED ORAL at 18:41

## 2017-07-04 RX ADMIN — Medication 400 MG: at 08:33

## 2017-07-04 RX ADMIN — DOFETILIDE 500 MCG: 0.5 CAPSULE ORAL at 08:32

## 2017-07-04 RX ADMIN — Medication 10 ML: at 16:13

## 2017-07-04 RX ADMIN — Medication 10 ML: at 21:33

## 2017-07-04 RX ADMIN — DOFETILIDE 250 MCG: 0.25 CAPSULE ORAL at 21:32

## 2017-07-04 NOTE — PROGRESS NOTES
Problem: Afib Pathway: Day 3  Goal: Off Pathway (Use only if patient is Off Pathway)  Outcome: Progressing Towards Goal  Started on Tikosyn 7/3/17. Converted back to NSR with 2nd dose this AM.  EKG completed, QTc .52 and dropping. [facility], NP aware. Problem: General Medical Care Plan  Goal: *Fluid volume balance  Outcome: Progressing Towards Goal  IV lasix. 1300+ out. Problem: Falls - Risk of  Goal: *Absence of falls  Outcome: Progressing Towards Goal  Up with minimal assist.  Calls for help with cords. Problem: Diabetes Self-Management  Goal: *Monitoring blood glucose, interpreting and using results  Identify recommended blood glucose targets and personal targets. Outcome: Progressing Towards Goal  Has not needed insulin coverage. Comments:   0800 Receved patient asleep after verbal report from [facility].  0830 assessment completed, meds given, pt eating breakfast.  Remains in AFib after converting back at 10pm.  Asymptomatic  1030 staff reports patient changed to NSR this AM.  QTc checked by Chivo Reid RN per order in [facility] 2h post Tikosyn dose. 1200 Remains in NSR. EKG done. Notified NP of rhythm change and QTc measurement. 1600 reassess done, no changes. Pt teaching done regarding Tikosyn. Gave pt education sheet, explained we are watching QTc and what QT is. Explained why it is important to monitor for this drug and what risks it increases. [address] c/w Dr Herson Morrison. Change Tikosyn to 1/2 dose. 1930 Verbal report given to JEFF Blake. Opportunity for questions provided.

## 2017-07-04 NOTE — INTERDISCIPLINARY ROUNDS
IDR/SLIDR Summary          Patient: Betzaida Grayson MRN: [id number]    Age: 49 y.o. YOB: 1965 Room/Bed: [address] [place] [address]   Admit Diagnosis: A-fib (HCC)  Anasarca  Acute chest pain  Pleural effusion, bilateral  Principal Diagnosis: A-fib (HCC)   Goals: HR control & anticoagulate  Readmission: NO  Quality Measure: CHF  VTE Prophylaxis: Mechanical and Chemical  Influenza Vaccine screening completed? YES  Pneumococcal Vaccine screening completed? YES  Mobility needs: Yes   Nutrition plan:Yes  Consults:Respiratory and Case Management    Financial concerns:Yes  Escalated to CM? YES  RRAT Score: 8   [facility]   Testing due for pt today? NO  LOS: 3 days Expected length of stay ?  days  Discharge plan: tbd   PCP: Yakelin Gonzalez MD  Transportation needs: No    Days before discharge:two or more days before discharge   Discharge disposition: Home    Signed:     Adolfo Rey RN  7/4/2017  12:11 AM

## 2017-07-04 NOTE — PROGRESS NOTES
Cardiology Progress Note         7/4/2017 4:16 PM    Admit Date: 7/1/2017    Admit Diagnosis: A-fib (HCC)  Anasarca  Acute chest pain  Pleural effusion, bilateral      Subjective: Lois Persaud is still in AFIB RVR since last night  He currently denies SOB while at rest  No chest pain  He denies blood in stool or urine. He denies lightheadedness or dizziness when getting OOB. Past Medical History:   Diagnosis Date    Arrhythmia     Sleep apnea      No past surgery  Social History     Social History    Marital status:      Spouse name: N/A    Number of children: N/A    Years of education: N/A     Occupational History    Not on file.      Social History Main Topics    Smoking status: Never Smoker    Smokeless tobacco: Former User    Alcohol use No      Comment: Occassionally    Drug use: No    Sexual activity: Not on file     Other Topics Concern    Not on file     Social History Narrative         Visit Vitals    /80    Pulse (!) 110    Temp 98 °F (36.7 °C)    Resp 20    Ht 5' 9\" (1.753 m)    Wt 339 lb 14.4 oz (154.2 kg)    SpO2 94%    BMI 50.19 kg/m2     Current Facility-Administered Medications   Medication Dose Route Frequency    sodium chloride (NS) flush 5-10 mL  5-10 mL IntraVENous Q8H    sodium chloride (NS) flush 5-10 mL  5-10 mL IntraVENous PRN    naloxone (NARCAN) injection 0.4 mg  0.4 mg IntraVENous PRN    magnesium oxide (MAG-OX) tablet 400 mg  400 mg Oral BID    potassium chloride SR (KLOR-CON 10) tablet 40 mEq  40 mEq Oral DAILY    dofetilide (TIKOSYN) capsule 500 mcg  500 mcg Oral Q12H    lisinopril (PRINIVIL, ZESTRIL) tablet 10 mg  10 mg Oral DAILY    metoprolol succinate (TOPROL-XL) XL tablet 50 mg  50 mg Oral DAILY    apixaban (ELIQUIS) tablet 5 mg  5 mg Oral BID    dofetilide (Tikosyn) QTc check 2-hours following administration  1 Each Other Q12H    insulin lispro (HUMALOG) injection   SubCUTAneous AC&HS    glucose chewable tablet 16 g  4 Tab Oral PRN    glucagon (GLUCAGEN) injection 1 mg  1 mg IntraMUSCular PRN    dextrose 10 % infusion 125-250 mL  125-250 mL IntraVENous PRN    sodium chloride (NS) flush 5-10 mL  5-10 mL IntraVENous Q8H    sodium chloride (NS) flush 5-10 mL  5-10 mL IntraVENous PRN    furosemide (LASIX) injection 40 mg  40 mg IntraVENous DAILY         Objective:      Physical Exam:  Visit Vitals    /80    Pulse (!) 110    Temp 98 °F (36.7 °C)    Resp 20    Ht 5' 9\" (1.753 m)    Wt 339 lb 14.4 oz (154.2 kg)    SpO2 94%    BMI 50.19 kg/m2     General Appearance:  Well developed, well nourished,alert and oriented x 3, and individual in no acute distress. Ears/Nose/Mouth/Throat:   Hearing grossly normal.         Neck: Supple. Chest:   Lungs clear to auscultation bilaterally. Cardiovascular:  Regular rate regular rhythm, no murmur. Abdomen:   Soft, morbid obesity   Extremities: 2+ edema bilaterally. Skin: Warm and dry.                  Data Review:   Labs:    Recent Results (from the past 24 hour(s))   PTT    Collection Time: 07/03/17 11:15 AM   Result Value Ref Range    aPTT 59.8 (H) 22.1 - 32.5 sec    aPTT, therapeutic range     58.0 - 77.0 SECS   GLUCOSE, POC    Collection Time: 07/03/17 11:16 AM   Result Value Ref Range    Glucose (POC) 116 (H) 65 - 100 mg/dL    Performed by Elvia Morning    PTT    Collection Time: 07/03/17  5:47 PM   Result Value Ref Range    aPTT 68.3 (H) 22.1 - 32.5 sec    aPTT, therapeutic range     58.0 - 77.0 SECS   POTASSIUM    Collection Time: 07/03/17  5:47 PM   Result Value Ref Range    Potassium 3.9 3.5 - 5.1 mmol/L   MAGNESIUM    Collection Time: 07/03/17  5:47 PM   Result Value Ref Range    Magnesium 2.0 1.6 - 2.4 mg/dL   GLUCOSE, POC    Collection Time: 07/03/17  5:48 PM   Result Value Ref Range    Glucose (POC) 103 (H) 65 - 100 mg/dL    Performed by Ava Blackmon    ECG RHYTHM ANALYSIS ADULT    Collection Time: 07/03/17  5:57 PM   Result Value Ref Range    Ventricular Rate 73 BPM    Atrial Rate 73 BPM    P-R Interval 160 ms    QRS Duration 96 ms    Q-T Interval 430 ms    QTC Calculation (Bezet) 473 ms    Calculated P Axis -5 degrees    Calculated R Axis 37 degrees    Calculated T Axis 9 degrees    Diagnosis       Normal sinus rhythm  Nonspecific T wave abnormality  Prolonged QT  When compared with ECG of 01-JUL-2017 17:32,  Sinus rhythm has replaced Atrial fibrillation  Vent. rate has decreased BY  93 BPM     GLUCOSE, POC    Collection Time: 07/03/17  9:35 PM   Result Value Ref Range    Glucose (POC) 133 (H) 65 - 100 mg/dL    Performed by [id number], BASIC    Collection Time: 07/04/17  3:14 AM   Result Value Ref Range    Sodium 136 136 - 145 mmol/L    Potassium 3.8 3.5 - 5.1 mmol/L    Chloride 103 97 - 108 mmol/L    CO2 27 21 - 32 mmol/L    Anion gap 6 5 - 15 mmol/L    Glucose 99 65 - 100 mg/dL    BUN 14 6 - 20 MG/DL    Creatinine 1.06 0.70 - 1.30 MG/DL    BUN/Creatinine ratio 13 12 - 20      GFR est AA >60 >60 ml/min/1.73m2    GFR est non-AA >60 >60 ml/min/1.73m2    Calcium 8.6 8.5 - 10.1 MG/DL   MAGNESIUM    Collection Time: 07/04/17  3:14 AM   Result Value Ref Range    Magnesium 2.3 1.6 - 2.4 mg/dL   PHOSPHORUS    Collection Time: 07/04/17  3:14 AM   Result Value Ref Range    Phosphorus 4.0 2.6 - 4.7 MG/DL   GLUCOSE, POC    Collection Time: 07/04/17  6:50 AM   Result Value Ref Range    Glucose (POC) 103 (H) 65 - 100 mg/dL    Performed by Janak Givens        Telemetry: AFIB RVR rate 110-130 converted to NSR this around 1030       Assessment:     Principal Problem:    A-fib ([address] (7/1/2017)    Active Problems:    Anasarca (7/1/2017)      Pleural effusion, bilateral (9/6/5894)    acute systolic CHF NYHA class 3    Plan:   Back in atrial fibrillation last night around 2200 then he converted back to NSR around 1030 AM.   He will receive his second dose of Tikosyn this morning, will need ECG 2 hours post dose   K3.8/Mag 2.3  BP controlled.    He is on eliquis for embolic CVA prevention. He is not ready to agree to ablation  Optimize GDMT for systolic dysfunction but this could be due to tachycardia    Saw and evaluated pt and agree with above assessment and plan. Pt back in sinus rhythm this past hour. Will continue to monitor in ICU; possible to floor bed tomorrow. Getting EKGs with tikosyn. Dr. Stone Montana to follow.   Lyssa Navarro, MD Lenz Draft NP  [phone]  [facility]

## 2017-07-04 NOTE — PROGRESS NOTES
Bedside and Verbal shift change report given to Lety (oncoming nurse) by Spencer Suarez (offgoing nurse). Report included the following information SBAR, Kardex, Intake/Output, MAR, Accordion, Recent Results and Cardiac Rhythm -NSR.     2000- Assumed care. Pt resting comfortably, VSS, NSR, RA, no c/o.  2100 - Up to BSC, BM x1.   2300 - QTc check post 250mcg Tikosyn dose given @ 2130, QTc 0.48, will monitor. 0100 - O2 sats down to low 80s-90%, placed on Bipap per order for HS.  0400 - Pt resting comfortably, VSS, remains NSR, no c/o.  0500 - Spoke to wife, update given. 0630 - CHG bath given. Bedside and Verbal shift change report given to Seb Valle (oncoming nurse) by Chuck Sanchez (offgoing nurse). Report included the following information SBAR, Kardex, Intake/Output, MAR, Accordion, Recent Results and Cardiac Rhythm -NSR.

## 2017-07-04 NOTE — PROGRESS NOTES
Bedside and Verbal shift change report given to LUCIA (oncoming nurse) by Carmen forest (offgoing nurse). Report included the following information SBAR, Kardex, Intake/Output, MAR, Accordion, Recent Results and Cardiac Rhythm -NSR.     2000 - Assumed care. Pt resting comfortably, VSS, NSR, O2 2L NC, no c/o.  2100 - Visitors at bedside. 2200 - Up to BS, BM x1. A fib, -140s, no c/o CP or SOB. 0000 - Pt remains in A fib, stable HR 80-90s, resting comfortably. Declines Bipap @ present, wants to try remaining on NC O2, O2 sats % on NC, will monitor. 0300 - HR 90s-120, VSS, A fib, resting comfortably. 0500 - Spoke w/ family, update given. 0630 - CHG bath. Family @ bedside. Bedside and Verbal shift change report given to Mercedes (oncoming nurse) by LUCIA (offgoing nurse). Report included the following information SBAR, Kardex, Intake/Output, MAR, Accordion, Recent Results and Cardiac Rhythm -A fib.

## 2017-07-04 NOTE — PROGRESS NOTES
Spiritual Care Assessment/Progress Notes    Heydi Louis [id number]  [id number]    1965  49 y.o.  male    Patient Telephone Number: [phone] (home)   Confucianism Affiliation: Reagan Vance   Language: English   Extended Emergency Contact Information  Primary Emergency Contact: Pedro Gavin Phone: [phone]  Relation: Spouse  Secondary Emergency Contact: Helen Marcano  Home Phone: [phone]  Relation: Spouse   Patient Active Problem List    Diagnosis Date Noted    Anasarca 07/01/2017    [facility] 07/01/2017    Acute chest pain 07/01/2017    Pleural effusion, bilateral 07/01/2017    Atrial flutter ([facility] 01/29/2015    PAF (paroxysmal atrial fibrillation) ([facility] 12/03/2014    Obesity 12/03/2014    GEE (obstructive sleep apnea) 12/03/2014        Date: 7/4/2017       Level of Confucianism/Spiritual Activity:  [x]         Involved in alena tradition/spiritual practice    []         Not involved in alena tradition/spiritual practice  []         Spiritually oriented    []         Claims no spiritual orientation    []         seeking spiritual identity  []         Feels alienated from Catholic practice/tradition  []         Feels angry about Catholic practice/tradition  [x]         Spirituality/Catholic tradition is a resource for coping at this time.   []         Not able to assess due to medical condition    Services Provided Today:  []         crisis intervention    []         reading Scriptures  [x]         spiritual assessment    []         prayer  [x]         empathic listening/emotional support  []         rites and rituals (cite in comments)  []         life review     []         Catholic support  []         theological development   []         advocacy  []         ethical dialog     []         blessing  []         bereavement support    [x]         support to family  []         anticipatory grief support   []         help with AMD  []         spiritual guidance    []         meditation      Spiritual Care Needs  [x]         Emotional Support  []         Spiritual/Yarsani Care  []         Loss/Adjustment  []         Advocacy/Referral                /Ethics  []         No needs expressed at               this time  []         Other: (note in               comments)  [address]  []         Follow up visits with               pt/family  []         Provide materials  []         Schedule sacraments  []         Contact Community               Clergy  [x]         Follow up as needed  []         Other: (note in               comments)     Visited pt who was being attended to by medical staff. Met pt's wife, Farzana Garcia just outside of pt's room. Listened to her concerns about his medical condition and the fear she experienced when he was in afib and required medical intervention. Pt and wife are both persons of [employer]ena and members of [employer]. Chaplains will follow as needed.     Chaplain Pascale, MDiv, MS, [facility]  [phone]

## 2017-07-04 NOTE — PROGRESS NOTES
Hospitalist Progress Note      Hospital summary: 49 y.o man with afib, DM, GEE, morbid obesity, who presented with shortness of breath. He was found to be in afib w/ rvr 7/1/2017. 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 BNP only 132 but this is unreliable in obese patients  -continue IV furosemide    Hypokalemia: due to diuresis  -replete; goal >4    Hypocalcemia: replete    Acute renal insufficiency: (POA) Cr 0.95->1.33, suspect cardiorenal  -improved with diuresis    GEE: (POA)  -continue home BiPAP    Type II DM: hgb a1c 6.7%    Morbid obesity    Code status: full  DVT prophylaxis: Eliquis  Disposition: Will keep the patient in CCU for today  ----------------------------------------------    CC: shortness of breath     S: dyspnea is much improved, leg edema improved, no chest pain, no palpitations or dizziness, still not at baseline. No n/v/d. Review of Systems:  Pertinent items are noted in HPI.     O:  Visit Vitals    /80    Pulse (!) 110    Temp 98 °F (36.7 °C)    Resp 20    Ht 5' 9\" (1.753 m)    Wt 154.2 kg (339 lb 14.4 oz)    SpO2 94%    BMI 50.19 kg/m2       PHYSICAL EXAM:  Gen: NAD, non-toxic  HEENT: anicteric sclerae, normal conjunctiva, MM moist  Neck: supple, thick neck  Heart: irreg irreg, tachycardic, no MRG, unable to assess JVD due to obesity, 1+ BLE edema  Lungs: CTA b/l, diminished breath sounds globally, non-labored respirations  Abd: soft, NT, distended, BS+  Extr: warm  Skin: dry, mild chronic venous stasis changes  Neuro: CN II-XII grossly intact, normal speech, moves all extremities  Psych: normal mood, appropriate affect      Intake/Output Summary (Last 24 hours) at 07/04/17 1144  Last data filed at 07/03/17 2000   Gross per 24 hour   Intake            508.8 ml   Output              400 ml   Net            108.8 ml        Recent labs & imaging reviewed:  Recent Labs      07/03/17   0334  07/02/17   0310   WBC  8.9  11.2*   HGB  11.0*  12.5   HCT  34.5*  38.4   PLT  235  245     Recent Labs      07/04/17   0314  07/03/17   1747  07/03/17   0334  07/02/17   0310   NA  136   --   143  139   K  3.8  3.9  3.2*  3.6   CL  103   --   110*  105   CO2  27   --   25  25   BUN  14   --   13  14   CREA  1.06   --   0.91  1.04   GLU  99   --   100  108*   CA  8.6   --   7.0*  8.6   MG  2.3  2.0  1.9   --    PHOS  4.0   --   3.6   --      Recent Labs      07/01/17   1738   SGOT  37   ALT  53   AP  99   TBILI  1.2*   TP  7.7   ALB  3.4*   GLOB  4.3*   LPSE  169     Recent Labs      07/03/17   1747  07/03/17   1115  07/03/17   0334   07/01/17   2047   INR   --    --    --    --   1.1   PTP   --    --    --    --   11.5*   APTT  68.3*  59.8*  54.7*   < >  26.6    < > = values in this interval not displayed.         Recent Labs      07/01/17   1738   TROIQ  <0.04     Lab Results   Component Value Date/Time    Cholesterol, total 147 07/02/2017 03:10 AM    HDL Cholesterol 36 07/02/2017 03:10 AM    LDL, calculated 88 07/02/2017 03:10 AM    Triglyceride 115 07/02/2017 03:10 AM    CHOL/HDL Ratio 4.1 07/02/2017 03:10 AM     Lab Results   Component Value Date/Time    Glucose (POC) 103 07/04/2017 06:50 AM    Glucose (POC) 133 07/03/2017 09:35 PM    Glucose (POC) 103 07/03/2017 05:48 PM    Glucose (POC) 116 07/03/2017 11:16 AM    Glucose (POC) 120 07/03/2017 07:27 AM     No results found for: COLOR, APPRN, SPGRU, REFSG, NEIL, PROTU, GLUCU, Livier Locker, BILU, UROU, EULALIA, Keely Del, EPSU, BACTU, WBCU, RBCU, CASTS, UCRY    Med list reviewed  Current Facility-Administered Medications   Medication Dose Route Frequency    sodium chloride (NS) flush 5-10 mL  5-10 mL IntraVENous Q8H    sodium chloride (NS) flush 5-10 mL  5-10 mL IntraVENous PRN    naloxone (NARCAN) injection 0.4 mg  0.4 mg IntraVENous PRN    magnesium oxide (MAG-OX) tablet 400 mg  400 mg Oral BID    potassium chloride SR (KLOR-CON 10) tablet 40 mEq  40 mEq Oral DAILY    dofetilide (TIKOSYN) capsule 500 mcg  500 mcg Oral Q12H    lisinopril (PRINIVIL, ZESTRIL) tablet 10 mg  10 mg Oral DAILY    metoprolol succinate (TOPROL-XL) XL tablet 50 mg  50 mg Oral DAILY    apixaban (ELIQUIS) tablet 5 mg  5 mg Oral BID    dofetilide (Tikosyn) QTc check 2-hours following administration  1 Each Other Q12H    insulin lispro (HUMALOG) injection   SubCUTAneous AC&HS    glucose chewable tablet 16 g  4 Tab Oral PRN    glucagon (GLUCAGEN) injection 1 mg  1 mg IntraMUSCular PRN    dextrose 10 % infusion 125-250 mL  125-250 mL IntraVENous PRN    sodium chloride (NS) flush 5-10 mL  5-10 mL IntraVENous Q8H    sodium chloride (NS) flush 5-10 mL  5-10 mL IntraVENous PRN    furosemide (LASIX) injection 40 mg  40 mg IntraVENous DAILY       Care Plan discussed with:  Patient/Family, Nurse and Consultant Dr. Char Obrien MD  Internal Medicine  Date of Service: 7/4/2017

## 2017-07-05 VITALS
BODY MASS INDEX: 46.65 KG/M2 | TEMPERATURE: 97.7 F | WEIGHT: 315 LBS | OXYGEN SATURATION: 95 % | HEIGHT: 69 IN | RESPIRATION RATE: 23 BRPM | SYSTOLIC BLOOD PRESSURE: 115 MMHG | HEART RATE: 84 BPM | DIASTOLIC BLOOD PRESSURE: 80 MMHG

## 2017-07-05 LAB
ANION GAP BLD CALC-SCNC: 6 MMOL/L (ref 5–15)
ATRIAL RATE: 75 BPM
ATRIAL RATE: 78 BPM
ATRIAL RATE: 89 BPM
BUN SERPL-MCNC: 14 MG/DL (ref 6–20)
BUN/CREAT SERPL: 11 (ref 12–20)
CALCIUM SERPL-MCNC: 9.4 MG/DL (ref 8.5–10.1)
CALCULATED P AXIS, ECG09: 39 DEGREES
CALCULATED P AXIS, ECG09: 39 DEGREES
CALCULATED P AXIS, ECG09: 49 DEGREES
CALCULATED R AXIS, ECG10: 19 DEGREES
CALCULATED R AXIS, ECG10: 28 DEGREES
CALCULATED R AXIS, ECG10: 31 DEGREES
CALCULATED T AXIS, ECG11: 19 DEGREES
CALCULATED T AXIS, ECG11: 21 DEGREES
CALCULATED T AXIS, ECG11: 46 DEGREES
CHLORIDE SERPL-SCNC: 101 MMOL/L (ref 97–108)
CO2 SERPL-SCNC: 31 MMOL/L (ref 21–32)
CREAT SERPL-MCNC: 1.24 MG/DL (ref 0.7–1.3)
DIAGNOSIS, 93000: NORMAL
GLUCOSE BLD STRIP.AUTO-MCNC: 113 MG/DL (ref 65–100)
GLUCOSE BLD STRIP.AUTO-MCNC: 116 MG/DL (ref 65–100)
GLUCOSE SERPL-MCNC: 113 MG/DL (ref 65–100)
P-R INTERVAL, ECG05: 142 MS
P-R INTERVAL, ECG05: 148 MS
P-R INTERVAL, ECG05: 152 MS
POTASSIUM SERPL-SCNC: 4.3 MMOL/L (ref 3.5–5.1)
Q-T INTERVAL, ECG07: 426 MS
Q-T INTERVAL, ECG07: 430 MS
Q-T INTERVAL, ECG07: 432 MS
QRS DURATION, ECG06: 94 MS
QRS DURATION, ECG06: 96 MS
QRS DURATION, ECG06: 96 MS
QTC CALCULATION (BEZET), ECG08: 482 MS
QTC CALCULATION (BEZET), ECG08: 490 MS
QTC CALCULATION (BEZET), ECG08: 518 MS
SERVICE CMNT-IMP: ABNORMAL
SERVICE CMNT-IMP: ABNORMAL
SODIUM SERPL-SCNC: 138 MMOL/L (ref 136–145)
VENTRICULAR RATE, ECG03: 75 BPM
VENTRICULAR RATE, ECG03: 78 BPM
VENTRICULAR RATE, ECG03: 89 BPM

## 2017-07-05 PROCEDURE — 93041 RHYTHM ECG TRACING: CPT

## 2017-07-05 PROCEDURE — 74011250637 HC RX REV CODE- 250/637: Performed by: INTERNAL MEDICINE

## 2017-07-05 PROCEDURE — 82962 GLUCOSE BLOOD TEST: CPT

## 2017-07-05 PROCEDURE — 74011250636 HC RX REV CODE- 250/636: Performed by: SPECIALIST

## 2017-07-05 PROCEDURE — 36415 COLL VENOUS BLD VENIPUNCTURE: CPT | Performed by: INTERNAL MEDICINE

## 2017-07-05 PROCEDURE — 80048 BASIC METABOLIC PNL TOTAL CA: CPT | Performed by: INTERNAL MEDICINE

## 2017-07-05 RX ORDER — METOPROLOL SUCCINATE 50 MG/1
50 TABLET, EXTENDED RELEASE ORAL DAILY
Qty: 30 TAB | Refills: 0 | Status: SHIPPED | OUTPATIENT
Start: 2017-07-05 | End: 2017-07-07 | Stop reason: SDUPTHER

## 2017-07-05 RX ORDER — FUROSEMIDE 40 MG/1
TABLET ORAL
Qty: 30 TAB | Refills: 1 | Status: SHIPPED | OUTPATIENT
Start: 2017-07-05 | End: 2017-07-07 | Stop reason: SDUPTHER

## 2017-07-05 RX ORDER — LISINOPRIL 10 MG/1
10 TABLET ORAL DAILY
Qty: 30 TAB | Refills: 1 | Status: SHIPPED | OUTPATIENT
Start: 2017-07-05 | End: 2017-07-07 | Stop reason: SDUPTHER

## 2017-07-05 RX ORDER — DOFETILIDE 0.25 MG/1
250 CAPSULE ORAL EVERY 12 HOURS
Qty: 60 CAP | Refills: 3 | Status: SHIPPED | OUTPATIENT
Start: 2017-07-05 | End: 2017-07-07 | Stop reason: SDUPTHER

## 2017-07-05 RX ADMIN — Medication 400 MG: at 08:14

## 2017-07-05 RX ADMIN — METOPROLOL SUCCINATE 50 MG: 50 TABLET, EXTENDED RELEASE ORAL at 08:14

## 2017-07-05 RX ADMIN — DOFETILIDE 250 MCG: 0.25 CAPSULE ORAL at 08:14

## 2017-07-05 RX ADMIN — LISINOPRIL 10 MG: 10 TABLET ORAL at 08:14

## 2017-07-05 RX ADMIN — POTASSIUM CHLORIDE 40 MEQ: 750 TABLET, FILM COATED, EXTENDED RELEASE ORAL at 08:14

## 2017-07-05 RX ADMIN — Medication 10 ML: at 06:51

## 2017-07-05 RX ADMIN — APIXABAN 5 MG: 5 TABLET, FILM COATED ORAL at 08:14

## 2017-07-05 RX ADMIN — FUROSEMIDE 40 MG: 10 INJECTION, SOLUTION INTRAMUSCULAR; INTRAVENOUS at 08:14

## 2017-07-05 NOTE — INTERDISCIPLINARY ROUNDS
IDR/SLIDR Summary          Patient: Tricia Lawrence MRN: [id number]    Age: 49 y.o. YOB: 1965 Room/Bed: [address] [place] [address]   Admit Diagnosis: A-fib (HCC)  Anasarca  Acute chest pain  Pleural effusion, bilateral  Principal Diagnosis: A-fib (HCC)   Goals: HR control & anticoagulate  Readmission: NO  Quality Measure: CHF  VTE Prophylaxis: Mechanical and Chemical  Influenza Vaccine screening completed? YES  Pneumococcal Vaccine screening completed? YES  Mobility needs: No   Nutrition plan:Yes  Consults:Respiratory and Case Management    Financial concerns:No  Escalated to CM? YES  RRAT Score: 8   Interventions:H2H  Testing due for pt today? NO  LOS: 4 days Expected length of stay ?  days  Discharge plan: tbd   PCP: Wilbert Frankel, MD  Transportation needs: No    Days before discharge:one day until discharge   Discharge disposition: Home    Signed:     Reji Sneed RN  7/5/2017  12:50 AM

## 2017-07-05 NOTE — PROGRESS NOTES
Hospitalist Progress Note      Hospital summary: 49 y.o man with afib, DM, GEE, morbid obesity, who presented with shortness of breath. He was found to be in afib w/ rvr 7/1/2017. 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 BNP only 132 but this is unreliable in obese patients  -continue IV furosemide    Hypokalemia: due to diuresis  -replete; goal >4    Hypocalcemia: replete    Acute renal insufficiency: (POA)   -improved with diuresis  -slightly worse today  -Monitor    GEE: (POA)  -continue home BiPAP    Type II DM: hgb a1c 6.7%    Morbid obesity    Code status: full  DVT prophylaxis: Eliquis  Disposition: ?home today vs tomorrow  ----------------------------------------------    CC: shortness of breath     S: dyspnea is much improved, leg edema improved, no chest pain, no palpitations or dizziness, still not at baseline. No n/v/d. Review of Systems:  Pertinent items are noted in HPI.     O:  Visit Vitals    /75    Pulse 80    Temp 97.8 °F (36.6 °C)    Resp 24    Ht 5' 9\" (1.753 m)    Wt 155.2 kg (342 lb 2.8 oz)    SpO2 93%    BMI 50.53 kg/m2       PHYSICAL EXAM:  Gen: NAD, non-toxic  HEENT: anicteric sclerae, normal conjunctiva, MM moist  Neck: supple, thick neck  Heart: irreg irreg, tachycardic, no MRG, unable to assess JVD due to obesity, 1+ BLE edema  Lungs: CTA b/l, diminished breath sounds globally, non-labored respirations  Abd: soft, NT, distended, BS+  Extr: warm  Skin: dry, mild chronic venous stasis changes  Neuro: CN II-XII grossly intact, normal speech, moves all extremities  Psych: normal mood, appropriate affect      Intake/Output Summary (Last 24 hours) at 07/05/17 1241  Last data filed at 07/05/17 1128   Gross per 24 hour   Intake              480 ml   Output             2100 ml   Net            -1620 ml        Recent labs & imaging reviewed:  Recent Labs      07/03/17   0334   WBC  8.9   HGB  11.0*   HCT  34.5*   PLT  235     Recent Labs      07/05/17   0812  07/04/17   0314  07/03/17   1747  07/03/17   0334   NA  138  136   --   143   K  4.3  3.8  3.9  3.2*   CL  101  103   --   110*   CO2  31  27   --   25   BUN  14  14   --   13   CREA  1.24  1.06   --   0.91   GLU  113*  99   --   100   CA  9.4  8.6   --   7.0*   MG   --   2.3  2.0  1.9   PHOS   --   4.0   --   3.6     No results for input(s): SGOT, GPT, ALT, AP, TBIL, TBILI, TP, ALB, GLOB, GGT, AML, LPSE in the last 72 hours. No lab exists for component: AMYP, HLPSE  Recent Labs      07/03/17   1747  07/03/17   1115  07/03/17   0334   APTT  68.3*  59.8*  54.7*        No results for input(s): CPK, CKNDX, TROIQ in the last 72 hours.     No lab exists for component: CPKMB  Lab Results   Component Value Date/Time    Cholesterol, total 147 07/02/2017 03:10 AM    HDL Cholesterol 36 07/02/2017 03:10 AM    LDL, calculated 88 07/02/2017 03:10 AM    Triglyceride 115 07/02/2017 03:10 AM    CHOL/HDL Ratio 4.1 07/02/2017 03:10 AM     Lab Results   Component Value Date/Time    Glucose (POC) 113 07/05/2017 06:49 AM    Glucose (POC) 108 07/04/2017 09:33 PM    Glucose (POC) 107 07/04/2017 06:03 PM    Glucose (POC) 128 07/04/2017 11:56 AM    Glucose (POC) 103 07/04/2017 06:50 AM     No results found for: COLOR, APPRN, SPGRU, REFSG, NEIL, PROTU, GLUCU, KETU, BILU, UROU, EULALIA, LEUKU, GLUKE, EPSU, BACTU, WBCU, RBCU, CASTS, [facility] list reviewed  Current Facility-Administered Medications   Medication Dose Route Frequency    sodium chloride (NS) flush 5-10 mL  5-10 mL IntraVENous Q8H    sodium chloride (NS) flush 5-10 mL  5-10 mL IntraVENous PRN    naloxone (NARCAN) injection 0.4 mg  0.4 mg IntraVENous PRN    dofetilide (TIKOSYN) capsule 250 mcg  250 mcg Oral Q12H    magnesium oxide (MAG-OX) tablet 400 mg  400 mg Oral BID    lisinopril (PRINIVIL, ZESTRIL) tablet 10 mg  10 mg Oral DAILY    metoprolol succinate (TOPROL-XL) XL tablet 50 mg  50 mg Oral DAILY    apixaban (ELIQUIS) tablet 5 mg  5 mg Oral BID    dofetilide (Tikosyn) QTc check 2-hours following administration  1 Each Other Q12H    insulin lispro (HUMALOG) injection   SubCUTAneous AC&HS    glucose chewable tablet 16 g  4 Tab Oral PRN    glucagon (GLUCAGEN) injection 1 mg  1 mg IntraMUSCular PRN    dextrose 10 % infusion 125-250 mL  125-250 mL IntraVENous PRN    sodium chloride (NS) flush 5-10 mL  5-10 mL IntraVENous Q8H    sodium chloride (NS) flush 5-10 mL  5-10 mL IntraVENous PRN    furosemide (LASIX) injection 40 mg  40 mg IntraVENous DAILY       Care Plan discussed with:  Patient/Family, Nurse and Consultant Dr. Dayron Bravo MD  Internal Medicine  Date of Service: 7/5/2017

## 2017-07-05 NOTE — PROGRESS NOTES
0730 Bedside and Verbal shift change report given to [place] (oncoming nurse) by Toya Ash (offgoing nurse). Report included the following information SBAR, Kardex, Procedure Summary, Intake/Output, MAR, Accordion and Recent Results. 1330 Dr. Can Bagley @ bedside. 4544 Discharge instructions provided to patient. All questions answered.

## 2017-07-05 NOTE — DISCHARGE INSTRUCTIONS
Discharge Instructions       PATIENT ID: Jose Daley  MRN: [id number]   YOB: 1965    DATE OF ADMISSION: 7/1/2017  5:27 PM    DATE OF DISCHARGE: 7/5/2017    PRIMARY CARE PROVIDER: Mae Lewis MD     ATTENDING PHYSICIAN: Ignacia Metcalf MD  DISCHARGING PROVIDER: Adelso Lawson NP    To contact this individual call [phone] and ask the  to page. If unavailable ask to be transferred the Adult Hospitalist Department. DISCHARGE DIAGNOSES   A fib    CONSULTATIONS: IP CONSULT TO HOSPITALIST  IP CONSULT TO CARDIOLOGY  IP CONSULT TO CARDIOLOGY    PROCEDURES/SURGERIES: * No surgery found *    PENDING TEST RESULTS:   At the time of discharge the following test results are still pending: none    FOLLOW UP APPOINTMENTS:   Follow-up Information     Follow up With Details Comments Contact Info    Ole Forrester MD In 1 week  [facility] [address] [address] [phone]      Tamiko Greco MD On 7/7/2017 [address] [phone]             ADDITIONAL CARE RECOMMENDATIONS:   Follow up with Dr Cecelia Garcia in 1-2 weeks    DIET: Cardiac Diet    ACTIVITY: Activity as tolerated      DISCHARGE MEDICATIONS:   See Medication Reconciliation Form    · It is important that you take the medication exactly as they are prescribed. · Keep your medication in the bottles provided by the pharmacist and keep a list of the medication names, dosages, and times to be taken in your wallet. · Do not take other medications without consulting your doctor. NOTIFY YOUR PHYSICIAN FOR ANY OF THE FOLLOWING:   Fever over 101 degrees for 24 hours. Chest pain, shortness of breath, fever, chills, nausea, vomiting, diarrhea, change in mentation, falling, weakness, bleeding. Severe pain or pain not relieved by medications. Or, any other signs or symptoms that you may have questions about.       DISPOSITION:  x  Home With:   OT  PT  [facility]  RN       SNF/Inpatient Rehab/LTAC    Independent/assisted living    Hospice    Other:     CDMP Checked:   Yes x     PROBLEM LIST Updated:  Yes x       Signed:   Cate Guerrero NP  7/5/2017  1:18 PM

## 2017-07-05 NOTE — PROGRESS NOTES
Cardiac electrophysiology Progress Note         7/5/2017 4:16 PM    Admit Date: 7/1/2017    Admit Diagnosis: A-fib (HCC)  Anasarca  Acute chest pain  Pleural effusion, bilateral      Subjective: Yuli Suero sitting on the side of bed visiting with his family. He says he feels much better today. No chest pain, breathing is okay. Past Medical History:   Diagnosis Date    Arrhythmia     Sleep apnea      No past surgery  Social History     Social History    Marital status:      Spouse name: N/A    Number of children: N/A    Years of education: N/A     Occupational History    Not on file.      Social History Main Topics    Smoking status: Never Smoker    Smokeless tobacco: Former User    Alcohol use No      Comment: Occassionally    Drug use: No    Sexual activity: Not on file     Other Topics Concern    Not on file     Social History Narrative         Visit Vitals    /75    Pulse 80    Temp 97.7 °F (36.5 °C)    Resp 24    Ht 5' 9\" (1.753 m)    Wt 342 lb 2.8 oz (155.2 kg)    SpO2 93%    BMI 50.53 kg/m2     Current Facility-Administered Medications   Medication Dose Route Frequency    sodium chloride (NS) flush 5-10 mL  5-10 mL IntraVENous Q8H    sodium chloride (NS) flush 5-10 mL  5-10 mL IntraVENous PRN    naloxone (NARCAN) injection 0.4 mg  0.4 mg IntraVENous PRN    dofetilide (TIKOSYN) capsule 250 mcg  250 mcg Oral Q12H    magnesium oxide (MAG-OX) tablet 400 mg  400 mg Oral BID    lisinopril (PRINIVIL, ZESTRIL) tablet 10 mg  10 mg Oral DAILY    metoprolol succinate (TOPROL-XL) XL tablet 50 mg  50 mg Oral DAILY    apixaban (ELIQUIS) tablet 5 mg  5 mg Oral BID    dofetilide (Tikosyn) QTc check 2-hours following administration  1 Each Other Q12H    insulin lispro (HUMALOG) injection   SubCUTAneous AC&HS    glucose chewable tablet 16 g  4 Tab Oral PRN    glucagon (GLUCAGEN) injection 1 mg  1 mg IntraMUSCular PRN    dextrose 10 % infusion 125-250 mL  125-250 mL IntraVENous PRN    sodium chloride (NS) flush 5-10 mL  5-10 mL IntraVENous Q8H    sodium chloride (NS) flush 5-10 mL  5-10 mL IntraVENous PRN    furosemide (LASIX) injection 40 mg  40 mg IntraVENous DAILY         Objective:      Physical Exam:  Visit Vitals    /75    Pulse 80    Temp 97.7 °F (36.5 °C)    Resp 24    Ht 5' 9\" (1.753 m)    Wt 342 lb 2.8 oz (155.2 kg)    SpO2 93%    BMI 50.53 kg/m2     General Appearance:  Well developed, well nourished,alert and oriented x 3, and individual in no acute distress. Ears/Nose/Mouth/Throat:   Hearing grossly normal.         Neck: Supple. Chest:   Lungs clear to auscultation bilaterally. Cardiovascular:  Regular rate regular rhythm, no murmur. Abdomen:   Soft, morbid obesity   Extremities: 2+ edema bilaterally. brown discoloration to BLE. Skin: Warm and dry.                  Data Review:   Labs:    Recent Results (from the past 24 hour(s))   GLUCOSE, POC    Collection Time: 07/04/17  6:03 PM   Result Value Ref Range    Glucose (POC) 107 (H) 65 - 100 mg/dL    Performed by CONNOR ANTHONY    GLUCOSE, POC    Collection Time: 07/04/17  9:33 PM   Result Value Ref Range    Glucose (POC) 108 (H) 65 - 100 mg/dL    Performed by [address], POC    Collection Time: 07/05/17  6:49 AM   Result Value Ref Range    Glucose (POC) 113 (H) 65 - 100 mg/dL    Performed by [address], BASIC    Collection Time: 07/05/17  8:12 AM   Result Value Ref Range    Sodium 138 136 - 145 mmol/L    Potassium 4.3 3.5 - 5.1 mmol/L    Chloride 101 97 - 108 mmol/L    CO2 31 21 - 32 mmol/L    Anion gap 6 5 - 15 mmol/L    Glucose 113 (H) 65 - 100 mg/dL    BUN 14 6 - 20 MG/DL    Creatinine 1.24 0.70 - 1.30 MG/DL    BUN/Creatinine ratio 11 (L) 12 - 20      GFR est AA >60 >60 ml/min/1.73m2    GFR est non-AA >60 >60 ml/min/1.73m2    Calcium 9.4 8.5 - 10.1 MG/DL   ECG RHYTHM ANALYSIS ADULT    Collection Time: 07/05/17  8:39 AM   Result Value Ref Range Ventricular Rate 75 BPM    Atrial Rate 75 BPM    P-R Interval 148 ms    QRS Duration 96 ms    Q-T Interval 432 ms    QTC Calculation (Bezet) 482 ms    Calculated P Axis 49 degrees    Calculated R Axis 31 degrees    Calculated T Axis 46 degrees    Diagnosis       Normal sinus rhythm  Nonspecific T wave abnormality  Prolonged QT  When compared with ECG of 04-JUL-2017 12:07,  No significant change was found  Confirmed by Connie Mccain M.D., Natacha Mask ([id number]) on 7/5/2017 10:43:59 AM     GLUCOSE, POC    Collection Time: 07/05/17  1:00 PM   Result Value Ref Range    Glucose (POC) 116 (H) 65 - 100 mg/dL    Performed by Stacie Austin        Telemetry:NSR 60-80;s. Assessment:     Principal Problem:    A-fib ([address] (7/1/2017)    Active Problems:    Anasarca (7/1/2017)      Pleural effusion, bilateral (2/9/3603)    acute systolic CHF NYHA class 3    Plan:   Tikosyn dose decreased to 250 mcg yesterday d/t prolonged Qtc  QTc is rechecked   K at 4.3 / Nancy Grippe 2.3 yesterday/ Normal renal function   Blood pressure is tolerating GDMT (lisinopril and coreg). He is tolerating Eliquis without bleeding side effects  Reviewed medications and side effects in detail. He is compensated on exam today. He will follow up in the office on Friday for a CHF visit and further arrhythmia monitoring. Reviewed plan with the patient and his family, he is agreeable. All questions answered.    Will bedside Rx medications prior to d/c     Pennie Reyna NP  [phone]  [facility] from  attending:   I have seen, examined patient, and discussed with nurse practitioner, registered nurse, reviewed, updated note and agree with the assessment and plan    I have talked to him and his family and he is feeling better than admission  NYHA class 2 now  Vital signs are stable  Exam shows regular rhythm and no rub  Leg edema  Assessment and Plan:  Continue to monitor AFIB and systolic acute CHF recovery in clinic in 2 days  He may go home  We discussed about drug cost and toxicities  Bed side RX before discharge  Arcadio Julien M.D.  Marinana Moscoso  Electrophysiology/Cardiology  [facility] [place], [facility] [address] [facility] [place] [address] [place] [address]  [phone]

## 2017-07-06 ENCOUNTER — PATIENT OUTREACH (OUTPATIENT)
Dept: CARDIOLOGY CLINIC | Age: 52
End: 2017-07-06

## 2017-07-06 NOTE — PROGRESS NOTES
This note will not be viewable in [address] Weight this am - 333    Emergency room consultation  HPI Comments: 49 y.o. male with past medical history significant for arrhythmia who presents from [facility] with chief complaint of SOB. Patient has reportedly had a \"hacking\" cough and shortness of breath for almost 2 weeks. He reports being seen at [facility] and was referred to the ED after having an elevated heart rate and atrial fibrillation. Patient states that he was diagnosed with atrial fibrillation almost 1.5 years ago and was prescribed metoprolol. He reports stopping the metoprolol shortly after starting it due to associated dizziness. Patient complains of some occasional pleuritic chest pain and intermittent leg swelling. He denies having any wheezing, syncope, nausea, vomiting, or fever. There are no other acute medical concerns at this time. Cardiology consultation  Pierre Nunez is a 49 y.o. male admitted for A-fib [facility]); Anasarca;Acute chest pain;Pleural effusion, loki*  Subjective: patient with known paroxysmal afib, has seen dr Arcelia Joseph and dr mei last 2/16 at which time he was essentially assymptomatic on metoprolol 50 bid. Shortly thereafter he was having dizzy spells, perhaps orthostasis, and he stopped meds and has not fup since. Has had several normal stress tests and normal echo in the past. Couple weeks ago began with some sob and non productive cough, occasional pleuritic cp and dependent leg edema. Does not weigh himself on regular basis. Denies diabetes but last hgb a1c in chart was 6.9. He has sleep apnea and wears bipap nightly. Cardioversion procedure note -  BRIEF HISTORY:  This is a 49 y. o.man with the history of persistent atrial fibrillation with rapid ventricular rate morbid obesity and LVEF has dropped. He presented with acute systolic CHF NYHA class 3 and despite cardizem IV his ventricular rate was still fast at times. He is on heparin and DESTINEE does not show thrombus. The risks and benefits have been discussed with him and he agreed to proceed. PROCEDURE IN DETAIL:   The patient is now in the supine position and the pads were applied in the anterior posterior direction. She was given additional Versed, fentanyl after a DESTINEE. Thereafter the conscious sedation was maintained and 200 joule biphasic shock was delivered in anterior posterior direction and did not convert her to sinus rhythm. Another 360 joule shock was then delivered in the anterior-posterior direction and converted to sinus rhythm . The patient was then arousable.        ASSESSMENT AND PLAN:  The patient will be kept in hospital for eliquis and tikosyn with CHF GDMT  Follow up outpatient to re determine LVEF if he can maintain NSR

## 2017-07-07 ENCOUNTER — OFFICE VISIT (OUTPATIENT)
Dept: CARDIOLOGY CLINIC | Age: 52
End: 2017-07-07

## 2017-07-07 VITALS
DIASTOLIC BLOOD PRESSURE: 70 MMHG | SYSTOLIC BLOOD PRESSURE: 100 MMHG | BODY MASS INDEX: 46.65 KG/M2 | WEIGHT: 315 LBS | HEART RATE: 78 BPM | HEIGHT: 69 IN | OXYGEN SATURATION: 98 %

## 2017-07-07 DIAGNOSIS — I50.22 CHF NYHA CLASS II, CHRONIC, SYSTOLIC (HCC): ICD-10-CM

## 2017-07-07 DIAGNOSIS — E66.01 MORBID OBESITY, UNSPECIFIED OBESITY TYPE (HCC): ICD-10-CM

## 2017-07-07 DIAGNOSIS — Z79.899 VISIT FOR MONITORING TIKOSYN THERAPY: ICD-10-CM

## 2017-07-07 DIAGNOSIS — Z98.890 HISTORY OF CARDIOVERSION: ICD-10-CM

## 2017-07-07 DIAGNOSIS — Z51.81 VISIT FOR MONITORING TIKOSYN THERAPY: ICD-10-CM

## 2017-07-07 DIAGNOSIS — G47.33 OSA (OBSTRUCTIVE SLEEP APNEA): ICD-10-CM

## 2017-07-07 DIAGNOSIS — I48.19 PERSISTENT ATRIAL FIBRILLATION (HCC): Primary | ICD-10-CM

## 2017-07-07 RX ORDER — DOFETILIDE 0.25 MG/1
250 CAPSULE ORAL EVERY 12 HOURS
Qty: 60 CAP | Refills: 3 | Status: SHIPPED | OUTPATIENT
Start: 2017-07-07 | End: 2017-12-11 | Stop reason: SDUPTHER

## 2017-07-07 RX ORDER — FUROSEMIDE 40 MG/1
TABLET ORAL
Qty: 30 TAB | Refills: 5 | Status: SHIPPED | OUTPATIENT
Start: 2017-07-07 | End: 2018-02-19 | Stop reason: SDUPTHER

## 2017-07-07 RX ORDER — LISINOPRIL 5 MG/1
5 TABLET ORAL DAILY
Qty: 30 TAB | Refills: 4 | Status: SHIPPED | OUTPATIENT
Start: 2017-07-07 | End: 2017-08-24

## 2017-07-07 RX ORDER — METOPROLOL SUCCINATE 50 MG/1
50 TABLET, EXTENDED RELEASE ORAL DAILY
Qty: 30 TAB | Refills: 5 | Status: SHIPPED | OUTPATIENT
Start: 2017-07-07 | End: 2018-03-19 | Stop reason: SDUPTHER

## 2017-07-07 RX ORDER — LISINOPRIL 10 MG/1
10 TABLET ORAL DAILY
Qty: 30 TAB | Refills: 5 | Status: SHIPPED | OUTPATIENT
Start: 2017-07-07 | End: 2017-07-07 | Stop reason: SDUPTHER

## 2017-07-07 NOTE — MR AVS SNAPSHOT
Visit Information Date & Time Provider Department Dept. Phone Encounter #  
 7/7/2017 10:00 AM Coy Patton MD [facility] [phone] [id number] Upcoming Health Maintenance Date Due Pneumococcal 19-64 Medium Risk (1 of 1 - PPSV23) 10/2/1985 DTaP/Tdap/Td series (1 - Tdap) 10/2/1987 FOBT Q 1 YEAR AGE 50-75 10/2/2016 INFLUENZA AGE 9 TO ADULT 8/1/2017 Allergies as of 7/7/2017  Review Complete On: 7/7/2017 By: Coy Patton MD  
 No Known Allergies Current Immunizations  Reviewed on 7/4/2017 No immunizations on file. Not reviewed this visit You Were Diagnosed With   
  
 Codes Comments CHF NYHA class II, chronic, systolic    -  Primary DCM-50-WB: I50.22 ICD-9-CM: 428.22, 428.0 Persistent atrial fibrillation (HCC)     ICD-10-CM: I48.1 ICD-9-CM: 427.31 Visit for monitoring Tikosyn therapy     ICD-10-CM: Z51.81, Z79.899 ICD-9-CM: V58.83, V58.69 Morbid obesity, unspecified obesity type     ICD-10-CM: E66.01 
ICD-9-CM: 278.01   
 GEE (obstructive sleep apnea)     ICD-10-CM: G47.33 
ICD-9-CM: 327.23 Vitals BP Pulse Height(growth percentile) Weight(growth percentile) SpO2 BMI  
 100/70 (BP 1 Location: Left arm, BP Patient Position: Sitting) 78 5' 9\" (1.753 m) 335 lb (152 kg) 98% 49.47 kg/m2 Smoking Status Never Smoker Vitals History BMI and BSA Data Body Mass Index Body Surface Area  
 49.47 kg/m 2 2.72 m 2 Preferred Pharmacy Pharmacy Name Phone [facility] [address] [facility], [facility] [address] [phone] Your Updated Medication List  
  
   
This list is accurate as of: 7/7/17 11:28 AM.  Always use your most recent med list.  
  
  
  
  
 apixaban 5 mg tablet Commonly known as:  Suleiman Chahal Take 1 Tab by mouth two (2) times a day. dofetilide 250 mcg capsule Commonly known as:  Janette Stoll  
 Take 1 Cap by mouth every twelve (12) hours every twelve (12) hours. Indications: PREVENTION OF RECURRENT ATRIAL FIBRILLATION  
  
 furosemide 40 mg tablet Commonly known as:  LASIX  
1 tab PO daily  
  
 lisinopril 5 mg tablet Commonly known as:  Cheyenne Hails Take 1 Tab by mouth daily. metoprolol succinate 50 mg XL tablet Commonly known as:  TOPROL-XL Take 1 Tab by mouth daily. Prescriptions Printed Refills  
 dofetilide (TIKOSYN) 250 mcg capsule 3 Sig: Take 1 Cap by mouth every twelve (12) hours every twelve (12) hours. Indications: PREVENTION OF RECURRENT ATRIAL FIBRILLATION Class: Print Route: Oral  
 furosemide (LASIX) 40 mg tablet 5 Si tab PO daily Class: Print  
 metoprolol succinate (TOPROL-XL) 50 mg XL tablet 5 Sig: Take 1 Tab by mouth daily. Class: Print Route: Oral  
 apixaban (ELIQUIS) 5 mg tablet 5 Sig: Take 1 Tab by mouth two (2) times a day. Class: Print Route: Oral  
 lisinopril (PRINIVIL, ZESTRIL) 5 mg tablet 4 Sig: Take 1 Tab by mouth daily. Class: Print Route: Oral  
  
We Performed the Following AMB POC EKG ROUTINE  LEADS, INTER & REP [84823 CPT(R)] To-Do List   
 2017 ECHO:  2D ECHO COMPLETE ADULT (TTE) W OR WO CONTR Patient Instructions You will need to follow up in clinic with Dr. Bonifacio Phillips and echo in 1 month. Introducing [facility] HE[facility] SERVICES! [facility] introduces [employer] patient portal. Now you can access parts of your medical record, email your doctor's office, and request medication refills online. 1. In your internet browser, go to [email or web address] 2. Click on the First Time User? Click Here link in the Sign In box. You will see the New Member Sign Up page. 3. Enter your [employer] Access Code exactly as it appears below. You will not need to use this code after youve completed the sign-up process.  If you do not sign up before the expiration date, you must request a new code. · [employer] Access Code: [email or web address] Expires: 10/1/2017  2:33 PM 
 
4. Enter the last four digits of your Social Security Number (xxxx) and Date of Birth (mm/dd/yyyy) as indicated and click Submit. You will be taken to the next sign-up page. 5. Create a [employer] ID. This will be your [employer] login ID and cannot be changed, so think of one that is secure and easy to remember. 6. Create a [employer] password. You can change your password at any time. 7. Enter your Password Reset Question and Answer. This can be used at a later time if you forget your password. 8. Enter your e-mail address. You will receive e-mail notification when new information is available in [address] 9. Click Sign Up. You can now view and download portions of your medical record. 10. Click the Download Summary menu link to download a portable copy of your medical information. If you have questions, please visit the Frequently Asked Questions section of the [employer] website. Remember, [employer] is NOT to be used for urgent needs. For medical emergencies, dial 911. Now available from your iPhone and Android! Please provide this summary of care documentation to your next provider. Your primary care clinician is listed as Christopher Mccabe. If you have any questions after today's visit, please call [phone].

## 2017-07-07 NOTE — PROGRESS NOTES
Cardiac Electrophysiology Office Note     Subjective: Alana Tipton is a 49 y.o. male patient who is seen today s/p hospital d/c for atrial fibrillation with RVR and systolic CHF. He had cardioversion after DESTINEE did not show thrombus  He was tachycardic on cardizem IV  He had seen Dr Bunnie Skiff and then me back in 2015 when he had AFIB  He was cardioverted and started on Tikosyn. Dose reduced to 250 mcg d/t prolonged Qtc. He was discharged on eliquis for embolic CVA prevention. He denies blood in the stool or urine. GDMT include lisinopril and Toprol. He has been weight himself daily. He say his weight went up 2 lbs from Thursday to Friday. He denies SOB, chest pain, palpitations or LE edema. His cough is improving. Social History     Social History    Marital status:      Spouse name: N/A    Number of children: N/A    Years of education: N/A     Occupational History    Not on file. Social History Main Topics    Smoking status: Never Smoker    Smokeless tobacco: Former User    Alcohol use No      Comment: Occassionally    Drug use: No    Sexual activity: Not on file     Other Topics Concern    Not on file     Social History Narrative     Family History   Problem Relation Age of Onset    Heart Disease Father     Stroke Mother            Patient Active Problem List    Diagnosis Date Noted    Anasarca 07/01/2017    A-fib ([address] 07/01/2017    Acute chest pain 07/01/2017    Pleural effusion, bilateral 07/01/2017    Atrial flutter ([address] 01/29/2015    PAF (paroxysmal atrial fibrillation) ([facility] 12/03/2014    Obesity 12/03/2014    GEE (obstructive sleep apnea) 12/03/2014     Current Outpatient Prescriptions   Medication Sig Dispense Refill    dofetilide (TIKOSYN) 250 mcg capsule Take 1 Cap by mouth every twelve (12) hours every twelve (12) hours.  Indications: PREVENTION OF RECURRENT ATRIAL FIBRILLATION 60 Cap 3    furosemide (LASIX) 40 mg tablet 1 tab PO daily 30 Tab 5    metoprolol succinate (TOPROL-XL) 50 mg XL tablet Take 1 Tab by mouth daily. 30 Tab 5    apixaban (ELIQUIS) 5 mg tablet Take 1 Tab by mouth two (2) times a day. 60 Tab 5    lisinopril (PRINIVIL, ZESTRIL) 5 mg tablet Take 1 Tab by mouth daily. 30 Tab 4     No Known Allergies  Past Medical History:   Diagnosis Date    Arrhythmia     Sleep apnea      Past Surgical History:   Procedure Laterality Date    NE CARDIOVERSION ELECTIVE ARRHYTHMIA EXTERNAL  7/3/2017          Family History   Problem Relation Age of Onset    Heart Disease Father     Stroke Mother      Social History   Substance Use Topics    Smoking status: Never Smoker    Smokeless tobacco: Former User    Alcohol use No      Comment: Occassionally        Review of Systems:   Constitutional: Negative for fever, chills, weight loss,+ malaise/fatigue. HEENT: Negative for nosebleeds, vision changes. Respiratory: Negative for cough, hemoptysis, sputum production, and wheezing. Cardiovascular: Negative for chest pain, palpitations, orthopnea, claudication, + leg swelling, no syncope, and PND. Gastrointestinal: Negative for nausea, vomiting, diarrhea, constipation, blood in stool and melena. Genitourinary: Negative for dysuria, and hematuria. Musculoskeletal: Negative for myalgias, arthralgia. Skin: Negative for rash. Heme: Does not bleed or bruise easily. Neurological: Negative for speech change and focal weakness     Objective:     Visit Vitals    /70 (BP 1 Location: Left arm, BP Patient Position: Sitting)    Pulse 78    Ht 5' 9\" (1.753 m)    Wt 335 lb (152 kg)    SpO2 98%    BMI 49.47 kg/m2      Physical Exam:   Constitutional:  well-nourished. No distress. morbidly Obese. Head: Normocephalic and atraumatic. Eyes: Pupils are equal, round  Neck: supple. No JVD present. Cardiovascular: Normal rate, regular rhythm. Exam reveals no gallop and no friction rub. No murmur heard.   Pulmonary/Chest: Effort normal and breath sounds normal. No wheezes. exam limited d/t body habitus. Abdominal: Soft morbidly obese  Musculoskeletal: +1 edema. Brown discoloration to BLE   Neurological: alert,oriented. Skin: Skin is warm and dry  Psychiatric: normal mood and affect. Behavior is normal. Judgment and thought content normal.      EKG: sinus rhythm qtc 440 msec  Nonspecific T wave changes    Assessment/Plan:       ICD-10-CM ICD-9-CM    1. Persistent atrial fibrillation (HCC) I48.1 427.31 AMB POC EKG ROUTINE W/ 12 LEADS, INTER & REP      2D ECHO COMPLETE ADULT (TTE) W OR WO CONTR   2. Visit for monitoring Tikosyn therapy Z51.81 V58.83 2D ECHO COMPLETE ADULT (TTE) W OR WO CONTR    Z79.899 V58.69    3. CHF NYHA class II, chronic, systolic D62.58 951.38 2D ECHO COMPLETE ADULT (TTE) W OR WO CONTR     428.0    4. Morbid obesity, unspecified obesity type E66.01 278.01 2D ECHO COMPLETE ADULT (TTE) W OR WO CONTR   5. GEE (obstructive sleep apnea) G47.33 327.23 2D ECHO COMPLETE ADULT (TTE) W OR WO CONTR   6. History of cardioversion Z98.890 V15.1        He remains in NSR on Tikosyn, ECG today shows normal QTc. He is on appropriate GDMT, will decrease lisinopril to 5 mg, BP slightly low. Continue daily weights, if weight continues to trend up take an extra 1/2 lasix tab. He will call if he gains more than 3 lbs in 1 day or 5 lbs in week. He will notify the office if cough does not improve. Reviewed medications and side effects in detail. Follow up in 1 month with me with echo to evaluate LVEF as he decided not to follow up with Dr Bro Johnson.   He wants me to take over his atrial fibrillation management  I think and explained to him and his wife that if LVEF normal then he may be able to come off eliquis and new cardiomyopathy then could be tachycardia related and he has to continue with tikosyn long term until it fails and consider ablation  Losing weight will help          Thank you for involving me in this patient's care and please call with further concerns or questions. Forrest Kemp M.D.   Electrophysiology/Cardiology  [facility] [place]  [facility], [place] [address] [facility], [address]  [phone]

## 2017-07-07 NOTE — PROGRESS NOTES
Cardiac Electrophysiology Office Note     Subjective: Hortencia Dominguez is a 49 y.o. male patient who is seen today s/p hospital d/c for atrial fibrillation with RVR and systolic CHF. He was cardioverted and started on Tikosyn. Dose reduced to 250 mcg d/t prolonged Qtc. He was discharged on eliquis for embolic CVA prevention. He denies blood in the stool or urine. GDMT include lisinopril and Toprol. He has been weight himself daily. He say his weight went up 2 lbs from Thursday to Friday. He denies SOB, chest pain, palpitations or LE edema. His cough is improving. Social History     Social History    Marital status:      Spouse name: N/A    Number of children: N/A    Years of education: N/A     Occupational History    Not on file. Social History Main Topics    Smoking status: Never Smoker    Smokeless tobacco: Former User    Alcohol use No      Comment: Occassionally    Drug use: No    Sexual activity: Not on file     Other Topics Concern    Not on file     Social History Narrative     Family History   Problem Relation Age of Onset    Heart Disease Father     Stroke Mother            Patient Active Problem List    Diagnosis Date Noted    Anasarca 07/01/2017    A-fib ([facility] 07/01/2017    Acute chest pain 07/01/2017    Pleural effusion, bilateral 07/01/2017    Atrial flutter ([facility] 01/29/2015    PAF (paroxysmal atrial fibrillation) ([facility] 12/03/2014    Obesity 12/03/2014    GEE (obstructive sleep apnea) 12/03/2014     Current Outpatient Prescriptions   Medication Sig Dispense Refill    dofetilide (TIKOSYN) 250 mcg capsule Take 1 Cap by mouth every twelve (12) hours every twelve (12) hours. Indications: PREVENTION OF RECURRENT ATRIAL FIBRILLATION 60 Cap 3    furosemide (LASIX) 40 mg tablet 1 tab PO daily 30 Tab 5    metoprolol succinate (TOPROL-XL) 50 mg XL tablet Take 1 Tab by mouth daily.  30 Tab 5    apixaban (ELIQUIS) 5 mg tablet Take 1 Tab by mouth two (2) times a day. 60 Tab 5    lisinopril (PRINIVIL, ZESTRIL) 5 mg tablet Take 1 Tab by mouth daily. 30 Tab 4     No Known Allergies  Past Medical History:   Diagnosis Date    Arrhythmia     Sleep apnea      Past Surgical History:   Procedure Laterality Date    [place] CARDIOVERSION ELECTIVE ARRHYTHMIA EXTERNAL  7/3/2017          Family History   Problem Relation Age of Onset    Heart Disease Father     Stroke Mother      Social History   Substance Use Topics    Smoking status: Never Smoker    Smokeless tobacco: Former User    Alcohol use No      Comment: Occassionally        Review of Systems:   Constitutional: Negative for fever, chills, weight loss,+ malaise/fatigue. HEENT: Negative for nosebleeds, vision changes. Respiratory: Negative for cough, hemoptysis, sputum production, and wheezing. Cardiovascular: Negative for chest pain, palpitations, orthopnea, claudication, leg swelling, syncope, and PND. Gastrointestinal: Negative for nausea, vomiting, diarrhea, constipation, blood in stool and melena. Genitourinary: Negative for dysuria, and hematuria. Musculoskeletal: Negative for myalgias, arthralgia. Skin: Negative for rash. Heme: Does not bleed or bruise easily. Neurological: Negative for speech change and focal weakness     Objective:     Visit Vitals    /70 (BP 1 Location: Left arm, BP Patient Position: Sitting)    Pulse 78    Ht 5' 9\" (1.753 m)    Wt 335 lb (152 kg)    SpO2 98%    BMI 49.47 kg/m2      Physical Exam:   Constitutional:  well-nourished. No distress. Obese. Head: Normocephalic and atraumatic. Eyes: Pupils are equal, round  Neck: supple. No JVD present. Cardiovascular: Normal rate, regular rhythm. Exam reveals no gallop and no friction rub. No murmur heard. Pulmonary/Chest: Effort normal and breath sounds normal. No wheezes. exam limited d/t body habitus. Abdominal: Soft, no tenderness. Musculoskeletal: +1 edema.  Brown discoloration to BLE   Neurological: alert,oriented. Skin: Skin is warm and dry  Psychiatric: normal mood and affect. Behavior is normal. Judgment and thought content normal.      EKG: sinus rhythm qtc 440 msec      Assessment/Plan:       ICD-10-CM ICD-9-CM    1. CHF NYHA class II, chronic, systolic D88.90 801.70      428.0    2. Persistent atrial fibrillation (HCC) I48.1 427.31 AMB POC EKG ROUTINE W/ 12 LEADS, INTER & REP   3. Visit for monitoring Tikosyn therapy Z51.81 V58.83     Z79.899 V58.69    4. Morbid obesity, unspecified obesity type E66.01 278.01    5. GEE (obstructive sleep apnea) G47.33 327.23    He remains in NSR on Tikosyn, ECG today shows normal QTc. He is on appropriate GDMT, will decrease lisinopril to 5 mg, BP slightly low. Continue daily weights, if weight continues to trend up take an extra 1/2 lasix tab. He will call if he gains more than 3 lbs in 1 day or 5 lbs in week. He will notify the office if cough does not improve. Reviewed medications and side effects in detail. Follow up in 1 month with Dr. Libertad Buckley with echo to evaluate LVEF. Thank you for involving me in this patient's care and please call with further concerns or questions. Clinton Morales M.D.   Electrophysiology/Cardiology  [facility] [place]  [address], [place] [address], [facility] [address]  [address] [facility], [address]  [phone]

## 2017-07-09 NOTE — DISCHARGE SUMMARY
Discharge Summary       PATIENT ID: Pierre Nunez  MRN: [id number]   YOB: 1965    DATE OF ADMISSION: 7/1/2017  5:27 PM    DATE OF DISCHARGE: 7/5/2017   PRIMARY CARE PROVIDER: Ruy Estrada MD     ATTENDING PHYSICIAN: Dr Catalina Alan  DISCHARGING PROVIDER: Catalina Alan MD    To contact this individual call [phone] and ask the  to page. If unavailable ask to be transferred the Adult Hospitalist Department. CONSULTATIONS: IP CONSULT TO CARDIOLOGY    PROCEDURES/SURGERIES: * No surgery found *    ADMITTING DIAGNOSES & HOSPITAL COURSE:   Paroxysmal afib: (POA) w/ rvr on presentation  -transitioning from IV to PO diltiazem and PO metoprolol  -Was on heparin drip and now switched to Eliquis  -DESTINEE and cardioversion 7/3  -Tikosyn given 7/3 and 7/4  -Continues to be in sinus since 7/4 am  -Cardiology recommended to discharge the patient.     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 BNP only 132 but this is unreliable in obese patients  -continue IV furosemide     Hypokalemia: due to diuresis  -replete; goal >4     Hypocalcemia: replete     Acute renal insufficiency: (POA)   -improved with diuresis  -Monitor     GEE: (POA)  -continue home BiPAP     Type II DM: hgb a1c 6.7%     Morbid obesity           DISCHARGE DIAGNOSES / PLAN:      1.   Paroxysmal A fib       PENDING TEST RESULTS:   At the time of discharge the following test results are still pending: none    FOLLOW UP APPOINTMENTS:    Follow-up Information     Follow up With Details Comments Contact Info    Lila Reyes MD In 1 week  [address] [facility] [address]  90 Lolly Zuniga MD On 7/7/2017 [facility] [address] CARE RECOMMENDATIONS:   Follow up with Dr Cecelia Garcia in 2 days  Follow up with CRYSTAL MENDEZ in 1 week    DIET: Cardiac Diet    ACTIVITY: Activity as tolerated      DISCHARGE MEDICATIONS:  Discharge Medication List as of 7/5/2017  2:08 PM      START taking these medications    Details   apixaban (ELIQUIS) 5 mg tablet Take 1 Tab by mouth two (2) times a day., Print, Disp-60 Tab, R-0      lisinopril (PRINIVIL, ZESTRIL) 10 mg tablet Take 1 Tab by mouth daily. , Print, Disp-30 Tab, R-1      furosemide (LASIX) 40 mg tablet 1 tab PO daily, Print, Disp-30 Tab, R-1      dofetilide (TIKOSYN) 250 mcg capsule Take 1 Cap by mouth every twelve (12) hours every twelve (12) hours. Indications: PREVENTION OF RECURRENT ATRIAL FIBRILLATION, Print, Disp-60 Cap, R-3         CONTINUE these medications which have CHANGED    Details   metoprolol succinate (TOPROL-XL) 50 mg XL tablet Take 1 Tab by mouth daily. , Print, Disp-30 Tab, R-0         STOP taking these medications       Aspirin, Buffered 81 mg tab Comments:   Reason for Stopping:                 NOTIFY YOUR PHYSICIAN FOR ANY OF THE FOLLOWING:   Fever over 101 degrees for 24 hours. Chest pain, shortness of breath, fever, chills, nausea, vomiting, diarrhea, change in mentation, falling, weakness, bleeding. Severe pain or pain not relieved by medications. Or, any other signs or symptoms that you may have questions about.     DISPOSITION:  x  Home With:   OT  PT  HH  RN       Long term SNF/Inpatient Rehab    Independent/assisted living    Hospice    Other:       PATIENT CONDITION AT DISCHARGE:     Functional status    Poor     Deconditioned    x Independent      Cognition    xx Lucid     Forgetful     Dementia      Catheters/lines (plus indication)    Whitney     PICC     PEG    x None      Code status   x  Full code     DNR      PHYSICAL EXAMINATION AT DISCHARGE:  Please see progress note      CHRONIC MEDICAL DIAGNOSES:  Problem List as of 7/5/2017  Date Reviewed: 7/1/2017          Codes Class Noted - Resolved    Anasarca ICD-10-CM: R60.1  ICD-9-CM: 782.3  7/1/2017 - Present        * (Principal)A-fib ([facility] ICD-10-CM: I48.91  ICD-9-CM: 427.31  7/1/2017 - Present        Acute chest pain ICD-10-CM: R07.9  ICD-9-CM: 786.50  7/1/2017 - Present        Pleural effusion, bilateral ICD-10-CM: J90  ICD-9-CM: 511.9  7/1/2017 - Present        Atrial flutter ([facility] ICD-10-CM: I48.92  ICD-9-CM: 427.32  1/29/2015 - Present        PAF (paroxysmal atrial fibrillation) (HCC) ICD-10-CM: I48.0  ICD-9-CM: 427.31  12/3/2014 - Present        Obesity ICD-10-CM: E66.9  ICD-9-CM: 278.00  12/3/2014 - Present        GEE (obstructive sleep apnea) ICD-10-CM: W82.09  ICD-9-CM: 327.23  12/3/2014 - Present              Greater than 36 minutes were spent with the patient on counseling and coordination of care    Signed:   Dariela Pack MD  7/9/2017  6:32 AM

## 2017-08-24 ENCOUNTER — OFFICE VISIT (OUTPATIENT)
Dept: CARDIOLOGY CLINIC | Age: 52
End: 2017-08-24

## 2017-08-24 ENCOUNTER — TELEPHONE (OUTPATIENT)
Dept: CARDIOLOGY CLINIC | Age: 52
End: 2017-08-24

## 2017-08-24 ENCOUNTER — CLINICAL SUPPORT (OUTPATIENT)
Dept: CARDIOLOGY CLINIC | Age: 52
End: 2017-08-24

## 2017-08-24 VITALS
BODY MASS INDEX: 46.65 KG/M2 | DIASTOLIC BLOOD PRESSURE: 60 MMHG | OXYGEN SATURATION: 99 % | WEIGHT: 315 LBS | HEART RATE: 60 BPM | SYSTOLIC BLOOD PRESSURE: 110 MMHG | HEIGHT: 69 IN

## 2017-08-24 DIAGNOSIS — I50.22 CHF NYHA CLASS II, CHRONIC, SYSTOLIC (HCC): ICD-10-CM

## 2017-08-24 DIAGNOSIS — G47.33 OSA (OBSTRUCTIVE SLEEP APNEA): ICD-10-CM

## 2017-08-24 DIAGNOSIS — Z79.899 VISIT FOR MONITORING TIKOSYN THERAPY: ICD-10-CM

## 2017-08-24 DIAGNOSIS — R93.1 REGIONAL WALL MOTION ABNORMALITY OF HEART: ICD-10-CM

## 2017-08-24 DIAGNOSIS — Z98.890 HISTORY OF CARDIOVERSION: ICD-10-CM

## 2017-08-24 DIAGNOSIS — Z51.81 VISIT FOR MONITORING TIKOSYN THERAPY: ICD-10-CM

## 2017-08-24 DIAGNOSIS — I48.19 PERSISTENT ATRIAL FIBRILLATION (HCC): ICD-10-CM

## 2017-08-24 DIAGNOSIS — I48.3 TYPICAL ATRIAL FLUTTER (HCC): ICD-10-CM

## 2017-08-24 DIAGNOSIS — I48.19 PERSISTENT ATRIAL FIBRILLATION (HCC): Primary | ICD-10-CM

## 2017-08-24 DIAGNOSIS — E66.01 MORBID OBESITY, UNSPECIFIED OBESITY TYPE (HCC): ICD-10-CM

## 2017-08-24 DIAGNOSIS — I51.7 LVH (LEFT VENTRICULAR HYPERTROPHY): Primary | ICD-10-CM

## 2017-08-24 RX ORDER — GUAIFENESIN 100 MG/5ML
81 LIQUID (ML) ORAL DAILY
Qty: 30 TAB | Refills: 6
Start: 2017-08-24 | End: 2019-12-16

## 2017-08-24 NOTE — MR AVS SNAPSHOT
Visit Information Date & Time Provider Department Dept. Phone Encounter #  
 8/24/2017 10:00 AM Dee Ingram MD [facility] Shanel Goldsmith [phone] [id number] Follow-up Instructions Return in about 6 months (around 2/24/2018). Upcoming Health Maintenance Date Due Pneumococcal 19-64 Medium Risk (1 of 1 - PPSV23) 10/2/1985 DTaP/Tdap/Td series (1 - Tdap) 10/2/1987 FOBT Q 1 YEAR AGE 50-75 10/2/2016 INFLUENZA AGE 9 TO ADULT 8/1/2017 Allergies as of 8/24/2017  Review Complete On: 8/24/2017 By: eDe Ingram MD  
 No Known Allergies Current Immunizations  Reviewed on 7/4/2017 No immunizations on file. Not reviewed this visit You Were Diagnosed With   
  
 Codes Comments Persistent atrial fibrillation (HCC)    -  Primary ICD-10-CM: I48.1 ICD-9-CM: 427.31 History of cardioversion     ICD-10-CM: Z98.890 ICD-9-CM: V15.1 Morbid obesity, unspecified obesity type ([facility] [address]     ICD-10-CM: E66.01 
ICD-9-CM: 278.01   
 GEE (obstructive sleep apnea)     ICD-10-CM: G47.33 
ICD-9-CM: 327.23 Typical atrial flutter (HCC)     ICD-10-CM: I48.3 ICD-9-CM: 427.32 Vitals BP Pulse Height(growth percentile) Weight(growth percentile) SpO2 BMI  
 110/60 (BP 1 Location: Left arm, BP Patient Position: Sitting) 60 5' 9\" (1.753 m) 333 lb (151 kg) 99% 49.18 kg/m2 Smoking Status Never Smoker Vitals History BMI and BSA Data Body Mass Index Body Surface Area  
 49.18 kg/m 2 2.71 m 2 Preferred Pharmacy Pharmacy Name Phone [facility] [address] [place], [place] - [address] [place] [address] Terrie Gio [phone] Your Updated Medication List  
  
   
This list is accurate as of: 8/24/17 10:42 AM.  Always use your most recent med list.  
  
  
  
  
 apixaban 5 mg tablet Commonly known as:  Kenny Royals Take 1 Tab by mouth two (2) times a day. dofetilide 250 mcg capsule Commonly known as:  Arvin Karimi  
 Take 1 Cap by mouth every twelve (12) hours every twelve (12) hours. Indications: PREVENTION OF RECURRENT ATRIAL FIBRILLATION  
  
 furosemide 40 mg tablet Commonly known as:  LASIX  
1 tab PO daily  
  
 lisinopril 5 mg tablet Commonly known as:  Jordan Tierra Take 1 Tab by mouth daily. metoprolol succinate 50 mg XL tablet Commonly known as:  TOPROL-XL Take 1 Tab by mouth daily. Follow-up Instructions Return in about 6 months (around 2/24/2018). Patient Instructions You will need to follow up in clinic with Dr. Anali Pearce in 6 months. Introducing [facility]! Roosevelt Mike introduces [employer] patient portal. Now you can access parts of your medical record, email your doctor's office, and request medication refills online. 1. In your internet browser, go to [email or web address] 2. Click on the First Time User? Click Here link in the Sign In box. You will see the New Member Sign Up page. 3. Enter your [employer] Access Code exactly as it appears below. You will not need to use this code after youve completed the sign-up process. If you do not sign up before the expiration date, you must request a new code. · [employer] Access Code: [email or web address] Expires: 10/1/2017  2:33 PM 
 
4. Enter the last four digits of your Social Security Number (xxxx) and Date of Birth (mm/dd/yyyy) as indicated and click Submit. You will be taken to the next sign-up page. 5. Create a [employer] ID. This will be your [employer] login ID and cannot be changed, so think of one that is secure and easy to remember. 6. Create a [employer] password. You can change your password at any time. 7. Enter your Password Reset Question and Answer. This can be used at a later time if you forget your password. 8. Enter your e-mail address. You will receive e-mail notification when new information is available in [address] 9. Click Sign Up. You can now view and download portions of your medical record. 10. Click the Download Summary menu link to download a portable copy of your medical information. If you have questions, please visit the Frequently Asked Questions section of the [employer] website. Remember, [employer] is NOT to be used for urgent needs. For medical emergencies, dial 911. Now available from your iPhone and Android! Please provide this summary of care documentation to your next provider. Your primary care clinician is listed as Keven Caraballo. If you have any questions after today's visit, please call [phone].

## 2017-08-24 NOTE — TELEPHONE ENCOUNTER
----- Message from Lata Lozada MD sent at 8/24/2017 12:41 PM EDT -----  2 D echo will be read by Dr Cathi Gonzalez  I reviewed the images and his LVEF appears normalized with NSR  He had wanted to stop eliquis and takes aspirin  He may stop lisinopril and continue with toprol and tikosyn

## 2017-08-24 NOTE — PROGRESS NOTES
Cardiac Electrophysiology Office Note     Subjective: West العراقي is a 49 y.o. male patient who is seen today for AFIB follow up  s/p hospital d/c for atrial fibrillation with RVR and acute systolic CHF LVEF 86%  He had cardioversion after DESTINEE did not show thrombus  He was tachycardic on cardizem IV  He had seen Dr Daysi Sellers and then me back in 2015 when he had AFIB  He was cardioverted and started on Tikosyn. Dose reduced to 250 mcg d/t prolonged Qtc. He was discharged on eliquis for embolic CVA prevention. He denies blood in the stool or urine. GDMT include lisinopril and Toprol. He denies SOB, chest pain, palpitations or LE edema. He has played tennis again    Social History     Social History    Marital status:      Spouse name: N/A    Number of children: N/A    Years of education: N/A     Occupational History    Not on file. Social History Main Topics    Smoking status: Never Smoker    Smokeless tobacco: Former User    Alcohol use No      Comment: Occassionally    Drug use: No    Sexual activity: Not on file     Other Topics Concern    Not on file     Social History Narrative     Family History   Problem Relation Age of Onset    Heart Disease Father     Stroke Mother            Patient Active Problem List    Diagnosis Date Noted    Anasarca 07/01/2017    A-fib ([facility] 07/01/2017    Acute chest pain 07/01/2017    Pleural effusion, bilateral 07/01/2017    Atrial flutter ([facility] 01/29/2015    PAF (paroxysmal atrial fibrillation) ([facility] [address] [facility] 12/03/2014    Obesity 12/03/2014    GEE (obstructive sleep apnea) 12/03/2014     Current Outpatient Prescriptions   Medication Sig Dispense Refill    dofetilide (TIKOSYN) 250 mcg capsule Take 1 Cap by mouth every twelve (12) hours every twelve (12) hours.  Indications: PREVENTION OF RECURRENT ATRIAL FIBRILLATION 60 Cap 3    furosemide (LASIX) 40 mg tablet 1 tab PO daily 30 Tab 5    metoprolol succinate (TOPROL-XL) 50 mg XL tablet Take 1 Tab by mouth daily. 30 Tab 5    apixaban (ELIQUIS) 5 mg tablet Take 1 Tab by mouth two (2) times a day. 60 Tab 5    lisinopril (PRINIVIL, ZESTRIL) 5 mg tablet Take 1 Tab by mouth daily. 30 Tab 4     No Known Allergies  Past Medical History:   Diagnosis Date    Arrhythmia     Sleep apnea      Past Surgical History:   Procedure Laterality Date    [place] CARDIOVERSION ELECTIVE ARRHYTHMIA EXTERNAL  7/3/2017          Family History   Problem Relation Age of Onset    Heart Disease Father     Stroke Mother      Social History   Substance Use Topics    Smoking status: Never Smoker    Smokeless tobacco: Former User    Alcohol use No      Comment: Occassionally        Review of Systems:   Constitutional: Negative for fever, chills, weight loss   HEENT: Negative for nosebleeds, vision changes. Respiratory: Negative for cough, hemoptysis, sputum production, and wheezing. Cardiovascular: Negative for chest pain, palpitations, orthopnea, claudication, + leg swelling, no syncope, and PND. Gastrointestinal: Negative for nausea, vomiting, diarrhea, constipation, blood in stool and melena. Genitourinary: Negative for dysuria, and hematuria. Musculoskeletal: Negative for myalgias, arthralgia. Skin: Negative for rash. Heme: Does not bleed or bruise easily. Neurological: Negative for speech change and focal weakness     Objective:     Visit Vitals    /60 (BP 1 Location: Left arm, BP Patient Position: Sitting)    Pulse 60    Ht 5' 9\" (1.753 m)    Wt 333 lb (151 kg)    SpO2 99%    BMI 49.18 kg/m2      Physical Exam:   Constitutional:  well-nourished. No distress. morbidly Obese. Head: Normocephalic and atraumatic. Eyes: Pupils are equal, round  Neck: supple. No JVD present. Cardiovascular: Normal rate, regular rhythm. Exam reveals no gallop and no friction rub. No murmur heard. Pulmonary/Chest: Effort normal and breath sounds normal. No wheezes. exam limited d/t body habitus.    Abdominal: Soft, morbidly obese  Musculoskeletal: +1 edema. Brown discoloration to BLE   Neurological: alert,oriented. Skin: Skin is warm and dry  Psychiatric: normal mood and affect. Behavior is normal. Judgment and thought content normal.         Assessment/Plan:       ICD-10-CM ICD-9-CM    1. Persistent atrial fibrillation (HCC) I48.1 427.31    2. History of cardioversion Z98.890 V15.1    3. Morbid obesity, unspecified obesity type ([address] E66.01 278.01    4. GEE (obstructive sleep apnea) G47.33 327.23    5. Typical atrial flutter (HCC) I48.3 427.32        He remains in NSR on Tikosyn  Pending echo to evaluate LVEF   If LVEF normal then he may be able to come off eliquis and new cardiomyopathy then could be tachycardia related and he has to continue with tikosyn long term until it fails and consider ablation  Losing weight will help   He is working on it  Future Appointments  Date Time Provider Shea Sneed   2/22/2018 10:20 AM Creig Galeazzi,  [address]              Thank you for involving me in this patient's care and please call with further concerns or questions. Chris Haynes M.D.   Electrophysiology/Cardiology  [facility] [place]  [address], [place] [address], [address]  [facility], [address] [place] [address]  [phone]

## 2017-08-24 NOTE — PROGRESS NOTES
2 D echo will be read by Dr Tavarez Begin  I reviewed the images and his LVEF appears normalized with NSR  He had wanted to stop eliquis and takes aspirin  He may stop lisinopril and continue with toprol and tikosyn

## 2017-08-24 NOTE — TELEPHONE ENCOUNTER
Verified patient with two types of identifiers. Notified patient of echo results and medication changes. Patient verbalized understanding and will call with any other questions.

## 2017-10-26 ENCOUNTER — TELEPHONE (OUTPATIENT)
Dept: SLEEP MEDICINE | Age: 52
End: 2017-10-26

## 2017-10-26 DIAGNOSIS — G47.33 OSA (OBSTRUCTIVE SLEEP APNEA): Primary | ICD-10-CM

## 2017-10-27 ENCOUNTER — DOCUMENTATION ONLY (OUTPATIENT)
Dept: SLEEP MEDICINE | Age: 52
End: 2017-10-27

## 2017-10-27 NOTE — TELEPHONE ENCOUNTER
Orders Placed This Encounter    AMB SUPPLY ORDER     Diagnosis: (G47.33) GEE (obstructive sleep apnea)  (primary encounter diagnosis)     Replacement Supplies for Positive Airway Pressure Therapy Device:   Duration of need: 99 months.  Oral/Nasal Combo Mask 1 every 3 months.  Oral Cushion Combo Mask (Replace) 2 per month.  Nasal Pillows Combo Mask (Replace) 2 per month.  Full Face Mask 1 every 3 months.  Full Face Mask Cushion 1 per month.  Nasal Cushion (Replace) 2 per month.  Nasal Pillows (Replace) 2 per month.  Nasal Interface Mask 1 every 3 months.  Headgear 1 every 6 months.  Chinstrap 1 every 6 months.  Tubing 1 every 3 months.  Filter(s) Disposable 2 per month.  Filter(s) Non-Disposable 1 every 6 months.  Oral Interface 1 every 3 months. [address] for [employer] (Replace) 1 every 6 months.  Tubing with heating element 1 every 3 months. Perform Mask Fitting per patient preference and comfort - replace as above. Elizabeth Mccann MD, FAA; NPI: [id number]    Electronically signed.  Date:- 10/27/17

## 2017-12-11 RX ORDER — DOFETILIDE 0.25 MG/1
250 CAPSULE ORAL EVERY 12 HOURS
Qty: 60 CAP | Refills: 3 | Status: SHIPPED | OUTPATIENT
Start: 2017-12-11 | End: 2018-04-21 | Stop reason: SDUPTHER

## 2017-12-11 NOTE — TELEPHONE ENCOUNTER
Request for Tikosyn. Last office visit 8/24/17, next office visit 2/22/18. Refills per verbal order from Dr. Carleen Carias.

## 2017-12-12 ENCOUNTER — TELEPHONE (OUTPATIENT)
Dept: CARDIOLOGY CLINIC | Age: 52
End: 2017-12-12

## 2017-12-12 DIAGNOSIS — Z79.899 MEDICATION THERAPY CONTINUED: Primary | ICD-10-CM

## 2017-12-12 NOTE — LETTER
12/12/2017 1:41 PM 
 
Mr. Yaneth Luna [address] [address] You are on a medication which requires routine monitoring of lab work. Your last metabolic panel was on 3/0/20 and we recommend this be done every 6 months. Enclosed you will find lab slips which you may take to the [facility] location closest to your residence. Please have them done as soon as you are able, to avoid any delay in our ability to refill your medications. If you have had them completed recently with PCP, please have them faxed to our office at [phone]. Sincerely, Ramirez Duron NP

## 2017-12-16 LAB
BUN SERPL-MCNC: 13 MG/DL (ref 6–24)
BUN/CREAT SERPL: 11 (ref 9–20)
CALCIUM SERPL-MCNC: 9.3 MG/DL (ref 8.7–10.2)
CHLORIDE SERPL-SCNC: 99 MMOL/L (ref 96–106)
CO2 SERPL-SCNC: 28 MMOL/L (ref 18–29)
CREAT SERPL-MCNC: 1.15 MG/DL (ref 0.76–1.27)
GFR SERPLBLD CREATININE-BSD FMLA CKD-EPI: 73 ML/MIN/1.73
GFR SERPLBLD CREATININE-BSD FMLA CKD-EPI: 85 ML/MIN/1.73
GLUCOSE SERPL-MCNC: 98 MG/DL (ref 65–99)
MAGNESIUM SERPL-MCNC: 2.1 MG/DL (ref 1.6–2.3)
POTASSIUM SERPL-SCNC: 4.9 MMOL/L (ref 3.5–5.2)
SODIUM SERPL-SCNC: 141 MMOL/L (ref 134–144)

## 2018-01-31 ENCOUNTER — OFFICE VISIT (OUTPATIENT)
Dept: SLEEP MEDICINE | Age: 53
End: 2018-01-31

## 2018-01-31 VITALS
SYSTOLIC BLOOD PRESSURE: 139 MMHG | HEIGHT: 69 IN | TEMPERATURE: 96.7 F | WEIGHT: 315 LBS | HEART RATE: 89 BPM | OXYGEN SATURATION: 98 % | DIASTOLIC BLOOD PRESSURE: 80 MMHG | BODY MASS INDEX: 46.65 KG/M2

## 2018-01-31 DIAGNOSIS — I48.0 PAF (PAROXYSMAL ATRIAL FIBRILLATION) (HCC): ICD-10-CM

## 2018-01-31 DIAGNOSIS — G47.31 COMPLEX SLEEP APNEA SYNDROME: Primary | ICD-10-CM

## 2018-01-31 DIAGNOSIS — E66.2 HYPOVENTILATION ASSOCIATED WITH OBESITY (HCC): ICD-10-CM

## 2018-01-31 NOTE — PATIENT INSTRUCTIONS
217 [facility] [place], [address]  Tel.  [phone]  Fax. 100 [facility] 60  [place], [address]  Tel.  [phone]  Fax. [phone] [address] [address] [place]  Tel.  [phone]  Fax. [phone]     Learning About CPAP for Sleep Apnea  What is CPAP? CPAP is a small machine that you use at home every night while you sleep. It increases air pressure in your throat to keep your airway open. When you have sleep apnea, this can help you sleep better so you feel much better. CPAP stands for \"continuous positive airway pressure. \"  The CPAP machine will have one of the following:  · A mask that covers your nose and mouth  · Prongs that fit into your nose  · A mask that covers your nose only, the most common type. This type is called NCPAP. The N stands for \"nasal.\"  Why is it done? CPAP is usually the best treatment for obstructive sleep apnea. It is the first treatment choice and the most widely used. Your doctor may suggest CPAP if you have:  · Moderate to severe sleep apnea. · Sleep apnea and coronary artery disease (CAD) or heart failure. How does it help? · CPAP can help you have more normal sleep, so you feel less sleepy and more alert during the daytime. · CPAP may help keep heart failure or other heart problems from getting worse. · NCPAP may help lower your blood pressure. · If you use CPAP, your bed partner may also sleep better because you are not snoring or restless. What are the side effects? Some people who use CPAP have:  · A dry or stuffy nose and a sore throat. · Irritated skin on the face. · Sore eyes. · Bloating. If you have any of these problems, work with your doctor to fix them. Here are some things you can try:  · Be sure the mask or nasal prongs fit well. · See if your doctor can adjust the pressure of your CPAP. · If your nose is dry, try a humidifier.   · If your nose is runny or stuffy, try decongestant medicine or a steroid nasal spray. If these things do not help, you might try a different type of machine. Some machines have air pressure that adjusts on its own. Others have air pressures that are different when you breathe in than when you breathe out. This may reduce discomfort caused by too much pressure in your nose. Where can you learn more? Go to Augmenix.be  Enter Yadira Sharpe in the search box to learn more about \"Learning About CPAP for Sleep Apnea. \"   © 3472-9523 Healthwise, Incorporated. Care instructions adapted under license by Otto Willis (which disclaims liability or warranty for this information). This care instruction is for use with your licensed healthcare professional. If you have questions about a medical condition or this instruction, always ask your healthcare professional. [address] any warranty or liability for your use of this information. Content Version: 3.9.64969; Last Revised: January 11, 2010  PROPER SLEEP HYGIENE    What to avoid  · Do not have drinks with caffeine, such as coffee or black tea, for 8 hours before bed. · Do not smoke or use other types of tobacco near bedtime. Nicotine is a stimulant and can keep you awake. · Avoid drinking alcohol late in the evening, because it can cause you to wake in the middle of the night. · Do not eat a big meal close to bedtime. If you are hungry, eat a light snack. · Do not drink a lot of water close to bedtime, because the need to urinate may wake you up during the night. · Do not read or watch TV in bed. Use the bed only for sleeping and sexual activity. What to try  · Go to bed at the same time every night, and wake up at the same time every morning. Do not take naps during the day. · Keep your bedroom quiet, dark, and cool. · Get regular exercise, but not within 3 to 4 hours of your bedtime. .  · Sleep on a comfortable pillow and mattress.   · If watching the clock makes you anxious, turn it facing away from you so you cannot see the time. · If you worry when you lie down, start a worry book. Well before bedtime, write down your worries, and then set the book and your concerns aside. · Try meditation or other relaxation techniques before you go to bed. · If you cannot fall asleep, get up and go to another room until you feel sleepy. Do something relaxing. Repeat your bedtime routine before you go to bed again. · Make your house quiet and calm about an hour before bedtime. Turn down the lights, turn off the TV, log off the computer, and turn down the volume on music. This can help you relax after a busy day. Drowsy Driving: The [facility] cites drowsiness as a causing factor in more than 719,886 police reported crashes annually, resulting in 76,000 injuries and 1,500 deaths. Other surveys suggest 55% of people polled have driven while drowsy in the past year, 23% had fallen asleep but not crashed, 3% crashed, and 2% had and accident due to drowsy driving. Who is at risk? Young Drivers: One study of drowsy driving accidents states that 55% of the drivers were under 25 years. Of those, 75% were male. Shift Workers and Travelers: People who work overnight or travel across time zones frequently are at higher risk of experiencing Circadian Rhythm Disorders. They are trying to work and function when their body is programed to sleep. Sleep Deprived: Lack of sleep has a serious impact on your ability to pay attention or focus on a task. Consistently getting less than the average of 8 hours your body needs creates partial or cumulative sleep deprivation. Untreated Sleep Disorders: Sleep Apnea, Narcolepsy, R.L.S., and other sleep disorders (untreated) prevent a person from getting enough restful sleep. This leads to excessive daytime sleepiness and increases the risk for drowsy driving accidents by up to 7 times.   Medications / Alcohol: Even over the counter medications can cause drowsiness. Medications that impair a drivers attention should have a warning label. Alcohol naturally makes you sleepy and on its own can cause accidents. Combined with excessive drowsiness its effects are amplified. Signs of Drowsy Driving:   * You don't remember driving the last few miles   * You may drift out of your chana   * You are unable to focus and your thoughts wander   * You may yawn more often than normal   * You have difficulty keeping your eyes open / nodding off   * Missing traffic signs, speeding, or tailgating  Prevention-   Good sleep hygiene, lifestyle and behavioral choices have the most impact on drowsy driving. There is no substitute for sleep and the average person requires 8 hours nightly. If you find yourself driving drowsy, stop and sleep. Consider the sleep hygiene tips provided during your visit as well. Medication Refill Policy: Refills for all medications require 1 week advance notice. Please have your pharmacy fax a refill request. We are unable to fax, or call in \"controled substance\" medications and you will need to pick these prescriptions up from our office. Dinetouch Activation    Thank you for requesting access to Dinetouch. Please follow the instructions below to securely access and download your online medical record. Dinetouch allows you to send messages to your doctor, view your test results, renew your prescriptions, schedule appointments, and more. How Do I Sign Up? 1. In your internet browser, go to [email or web address]. 2. Click on the First Time User? Click Here link in the Sign In box. You will see the New Member Sign Up page. 3. Enter your Dinetouch Access Code exactly as it appears below. You will not need to use this code after youve completed the sign-up process. If you do not sign up before the expiration date, you must request a new code.     Dinetouch Access Code: [email or web address]  Expires: 2018  4:31 PM (This is the date your Prompt.ly access code will )    4. Enter the last four digits of your Social Security Number (xxxx) and Date of Birth (mm/dd/yyyy) as indicated and click Submit. You will be taken to the next sign-up page. 5. Create a Prompt.ly ID. This will be your Prompt.ly login ID and cannot be changed, so think of one that is secure and easy to remember. 6. Create a Prompt.ly password. You can change your password at any time. 7. Enter your Password Reset Question and Answer. This can be used at a later time if you forget your password. 8. Enter your e-mail address. You will receive e-mail notification when new information is available in [address] 9. Click Sign Up. You can now view and download portions of your medical record. 10. Click the Download Summary menu link to download a portable copy of your medical information. Additional Information    If you have questions, please call [phone]. Remember, Prompt.ly is NOT to be used for urgent needs. For medical emergencies, dial 911.

## 2018-01-31 NOTE — PROGRESS NOTES
217 [facility]. [place], [address]  Tel.  [phone]  Fax. 100 [facility] 60  [place], [address] [facility]  Tel.  [phone]  Fax. [phone] [address] [facility] 151 [place] [address]  Tel.  [phone]  Fax. [phone]     S>Bert Faulkner is a 47 y.o. male seen for a positive airway pressure follow-up. He reports no problems using the device. He is 90% compliant over the past 30 days. The following problems are identified:    Drowsiness no Problems exhaling no   Snoring no Forget to put on no   Mask Comfortable yes Can't fall asleep no   Dry Mouth no Mask falls off no   Air Leaking no Frequent awakenings no         He admits that his sleep has improved. CO2 28  18 - 29 mmol/L         No Known Allergies    He has a current medication list which includes the following prescription(s): dofetilide, aspirin, furosemide, and metoprolol succinate. .      He  has a past medical history of Arrhythmia and Sleep apnea. Ross Sleepiness Score: 16   and Modified F.O.S.Q. Score Total / 2: 17     O>    Visit Vitals    /80    Pulse 89    Temp 96.7 °F (35.9 °C) (Oral)    Ht 5' 9\" (1.753 m)    Wt 340 lb (154.2 kg)    SpO2 98%    BMI 50.21 kg/m2         General:   Not in acute distress   Eyes:  Anicteric sclerae, no obvious strabismus   Nose:  No obvious nasal septum deviation    Oropharynx:   Class 4 oropharyngeal outlet, thick tongue base, uvula not seen due to low-lying soft palate, narrow tonsilo-pharyngeal pilars   Tonsils:   tonsils are not visualized due to low-lying soft palate   Neck:   midline trachea   Chest/Lungs:  Equal lung expansion, clear on auscultation    CVS:  Normal rate, regular rhythm; no JVD   Skin:  Warm to touch; no obvious rashes   Neuro:  No focal deficits ; no obvious tremor    Psych:  Normal affect,  normal countenance;           A>    ICD-10-CM ICD-9-CM    1.  Complex sleep apnea syndrome G47.31 327.21 BLOOD GAS, ARTERIAL 2. Hypoventilation associated with obesity ([facility]) E66.2 278.03 BLOOD GAS, ARTERIAL   3. PAF (paroxysmal atrial fibrillation) ([facility]) I48.0 427.31    4. BMI 45.0-49.9, adult ([facility]) Z68.42 V85.42      AHI = 87.7. On AVAPS :  See media for download    Compliant:      yes    Therapeutic Response:  Positive    P>    * Patient declines wakefulness promoting agents. * He does agree to an ABG and if pCO2 is between 45-52 he will come for an AVAPS titration, if > 52 would consider AVAPS AE or iVAPS. * We have recommended a dedicated weight loss through appropriate diet and an exercise regiment as significant weight reduction has been shown to reduce severity of obstructive sleep apnea. * Follow-up Disposition:  Return in about 1 year (around 1/31/2019), or if symptoms worsen or fail to improve. * He was asked to contact our office for any problems regarding PAP therapy. * Counseling was provided regarding the importance of regular PAP use and on proper sleep hygiene and safe driving. * Re-enforced proper and regular cleaning for the device. Thank you for allowing us to participate in your patient's medical care. Martha Petersen MD, FAASM  Electronically signed.  01/31/18

## 2018-02-19 ENCOUNTER — HOSPITAL ENCOUNTER (OUTPATIENT)
Dept: PULMONOLOGY | Age: 53
Discharge: HOME | End: 2018-02-19
Attending: INTERNAL MEDICINE
Payer: COMMERCIAL

## 2018-02-19 DIAGNOSIS — E66.2 HYPOVENTILATION ASSOCIATED WITH OBESITY (HCC): ICD-10-CM

## 2018-02-19 DIAGNOSIS — G47.31 COMPLEX SLEEP APNEA SYNDROME: ICD-10-CM

## 2018-02-19 LAB
ARTERIAL PATENCY WRIST A: YES
BASE EXCESS BLDA CALC-SCNC: 2.3 MMOL/L
BDY SITE: ABNORMAL
HCO3 BLDA-SCNC: 27 MMOL/L (ref 22–26)
PCO2 BLDA: 42 MMHG (ref 35–45)
PH BLDA: 7.43 [PH] (ref 7.35–7.45)
PO2 BLDA: 75 MMHG (ref 80–100)
SAO2 % BLD: 95 % (ref 92–97)
SAO2% DEVICE SAO2% SENSOR NAME: ABNORMAL
SPECIMEN SITE: ABNORMAL

## 2018-02-19 PROCEDURE — 36600 WITHDRAWAL OF ARTERIAL BLOOD: CPT | Performed by: INTERNAL MEDICINE

## 2018-02-19 PROCEDURE — 82803 BLOOD GASES ANY COMBINATION: CPT | Performed by: INTERNAL MEDICINE

## 2018-02-19 RX ORDER — FUROSEMIDE 40 MG/1
TABLET ORAL
Qty: 30 TAB | Refills: 5 | Status: SHIPPED | OUTPATIENT
Start: 2018-02-19 | End: 2018-08-19 | Stop reason: SDUPTHER

## 2018-02-19 NOTE — TELEPHONE ENCOUNTER
Request for lasix 40mg every day. Last office visit 8/24/17, next office visit 2/2/18. Refills per verbal order from Dr. Mansi Chávez.

## 2018-02-22 ENCOUNTER — OFFICE VISIT (OUTPATIENT)
Dept: CARDIOLOGY CLINIC | Age: 53
End: 2018-02-22

## 2018-02-22 VITALS
DIASTOLIC BLOOD PRESSURE: 70 MMHG | HEIGHT: 69 IN | SYSTOLIC BLOOD PRESSURE: 138 MMHG | BODY MASS INDEX: 46.65 KG/M2 | WEIGHT: 315 LBS

## 2018-02-22 DIAGNOSIS — G47.33 OSA (OBSTRUCTIVE SLEEP APNEA): ICD-10-CM

## 2018-02-22 DIAGNOSIS — Z79.899 VISIT FOR MONITORING TIKOSYN THERAPY: ICD-10-CM

## 2018-02-22 DIAGNOSIS — E66.01 MORBID OBESITY, UNSPECIFIED OBESITY TYPE (HCC): ICD-10-CM

## 2018-02-22 DIAGNOSIS — I48.19 PERSISTENT ATRIAL FIBRILLATION (HCC): Primary | ICD-10-CM

## 2018-02-22 DIAGNOSIS — Z98.890 HISTORY OF CARDIOVERSION: ICD-10-CM

## 2018-02-22 DIAGNOSIS — I48.3 TYPICAL ATRIAL FLUTTER (HCC): ICD-10-CM

## 2018-02-22 DIAGNOSIS — Z51.81 VISIT FOR MONITORING TIKOSYN THERAPY: ICD-10-CM

## 2018-02-22 NOTE — PROGRESS NOTES
Chief Complaint   Patient presents with    Follow-up     6 month     Irregular Heart Beat     a fib      Verified patient with two types of identifiers. Verified medications with the patient.     Verified patient's pharmacy

## 2018-02-22 NOTE — PROGRESS NOTES
Cardiac Electrophysiology Office Note     Subjective: Perlita Cash is a 47 y.o. male patient who is seen today for AFIB follow up  s/p hospital d/c for atrial fibrillation with RVR and acute systolic CHF LVEF 49%  He had cardioversion after DESTINEE did not show thrombus  He was tachycardic on cardizem IV  He had seen Dr Kevyn Medina and then me back in 2015 when he had AFIB  He was cardioverted and started on Tikosyn. Dose reduced to 250 mcg d/t prolonged Qtc. He was discharged on eliquis for embolic CVA prevention. He denies blood in the stool or urine. Last echo last year showed normalized LVEF 55%     He denies SOB, chest pain, palpitations or LE edema. He has played [email or web address]   Weight at home about 340     Social History     Social History    Marital status:      Spouse name: N/A    Number of children: N/A    Years of education: N/A     Occupational History    Not on file. Social History Main Topics    Smoking status: Never Smoker    Smokeless tobacco: Former User    Alcohol use No      Comment: Occassionally social    Drug use: No    Sexual activity: Not on file     Other Topics Concern    Not on file     Social History Narrative     Family History   Problem Relation Age of Onset    Heart Disease Father     Stroke Mother            Patient Active Problem List    Diagnosis Date Noted    Anasarca 07/01/2017    A-fib ([facility] [facility] 07/01/2017    Acute chest pain 07/01/2017    Pleural effusion, bilateral 07/01/2017    Atrial flutter ([address] 01/29/2015    PAF (paroxysmal atrial fibrillation) ([facility] [facility] 12/03/2014    Obesity 12/03/2014    GEE (obstructive sleep apnea) 12/03/2014     Current Outpatient Prescriptions   Medication Sig Dispense Refill    furosemide (LASIX) 40 mg tablet 1 tab PO daily 30 Tab 5    dofetilide (TIKOSYN) 250 mcg capsule Take 1 Cap by mouth every twelve (12) hours every twelve (12) hours.  Indications: PREVENTION OF RECURRENT ATRIAL FIBRILLATION 60 Cap 3    aspirin 81 mg chewable tablet Take 1 Tab by mouth daily. 30 Tab 6    metoprolol succinate (TOPROL-XL) 50 mg XL tablet Take 1 Tab by mouth daily. 30 Tab 5     No Known Allergies  Past Medical History:   Diagnosis Date    Arrhythmia     Sleep apnea      Past Surgical History:   Procedure Laterality Date    UT CARDIOVERSION ELECTIVE ARRHYTHMIA EXTERNAL  7/3/2017          Family History   Problem Relation Age of Onset    Heart Disease Father     Stroke Mother      Social History   Substance Use Topics    Smoking status: Never Smoker    Smokeless tobacco: Former User    Alcohol use No      Comment: Occassionally social        Review of Systems:   Constitutional: Negative for fever, chills, weight loss   HEENT: Negative for nosebleeds, vision changes. Respiratory: Negative for cough, hemoptysis, sputum production, and wheezing. Cardiovascular: Negative for chest pain, palpitations, orthopnea, claudication, + leg swelling, no syncope, and PND. Gastrointestinal: Negative for nausea, vomiting, diarrhea, constipation, blood in stool and melena. Genitourinary: Negative for dysuria, and hematuria. Musculoskeletal: Negative for myalgias, arthralgia. Skin: Negative for rash. Heme: Does not bleed or bruise easily. Neurological: Negative for speech change and focal weakness     Objective:     Visit Vitals    /70 (BP 1 Location: Left arm, BP Patient Position: Sitting)    Ht 5' 9\" (1.753 m)    Wt 344 lb 11.2 oz (156.4 kg)    BMI 50.9 kg/m2      Physical Exam:   Constitutional:  well-nourished. No distress. morbidly Obese. Head: Normocephalic and atraumatic. Eyes: Pupils are equal, round  Neck: supple. No JVD present. Cardiovascular: Normal rate, regular rhythm. Exam reveals no gallop and no friction rub. No murmur heard. Pulmonary/Chest: Effort normal and breath sounds normal. No wheezes. exam limited d/t body habitus. Abdominal: Soft, morbidly obese  Musculoskeletal: +1 edema.  [facility] discoloration to BLE   Neurological: alert,oriented. Skin: Skin is warm and dry  Psychiatric: normal mood and affect. Behavior is normal. Judgment and thought content normal.       ECG  NSR non specific T wave changes  QTc normal    Assessment/Plan:       ICD-10-CM ICD-9-CM    1. Persistent atrial fibrillation (HCC) I48.1 427.31    2. Typical atrial flutter (HCC) I48.3 427.32 AMB POC EKG ROUTINE W/ 12 LEADS, INTER & REP   3. Morbid obesity, unspecified obesity type ([facility] [address] E66.01 278.01    4. Visit for monitoring Tikosyn therapy Z51.81 V58.83     Z79.899 V58.69    5. GEE (obstructive sleep apnea) G47.33 327.23    6. History of cardioversion Z98.890 V15.1        He remains in NSR on Tikosyn with normal QTc at this dose  BP is normal  Losing weight is needed  He is working on it     Labs needed in 6 months BMP and Mag with follow up    Thank you for involving me in this patient's care and please call with further concerns or questions. Isael Cotter M.D.   Electrophysiology/Cardiology  [facility] [place]  [address], [place] [address] [facility], [address]  [phone]

## 2018-02-22 NOTE — MR AVS SNAPSHOT
[address] [facility] [address] [facility] 
[phone] Patient: Alexandra Shafer MRN: [id number] :1965 Visit Information Date & Time Provider Department Dept. Phone Encounter #  
 2018 10:20 AM Torrey Alfaro MD [facility] [place] [phone] [id number] Follow-up Instructions Return in about 6 months (around 2018). Follow-up and Disposition History Your Appointments 2018  3:20 PM  
ESTABLISHED PATIENT with Torrey Alfaro MD  
[facility] ([address]) Appt Note: 6 month f/u  
 [address] [facility] [address] [facility]  
[address] [facility] [address] Upcoming Health Maintenance Date Due Pneumococcal 19-64 Medium Risk (1 of 1 - PPSV23) 10/2/1985 DTaP/Tdap/Td series (1 - Tdap) 10/2/1987 FOBT Q 1 YEAR AGE 50-75 10/2/2016 Influenza Age 5 to Adult 2017 Allergies as of 2018  Review Complete On: 2018 By: Torrey Alfaro MD  
 No Known Allergies Current Immunizations  Reviewed on 2017 No immunizations on file. Not reviewed this visit You Were Diagnosed With   
  
 Codes Comments Persistent atrial fibrillation (HCC)    -  Primary ICD-10-CM: I48.1 ICD-9-CM: 427.31 Typical atrial flutter (HCC)     ICD-10-CM: I48.3 ICD-9-CM: 427.32 Morbid obesity, unspecified [facility] type ([facility]     ICD-10-CM: E66.01 
ICD-9-CM: 278.01 Visit for monitoring Tikosyn therapy     ICD-10-CM: Z51.81, Z79.899 ICD-9-CM: V58.83, V58.69   
 GEE (obstructive sleep apnea)     ICD-10-CM: G47.33 
ICD-9-CM: 327.23 History of cardioversion     ICD-10-CM: Z98.890 ICD-9-CM: V15.1 Vitals BP Height(growth percentile) Weight(growth percentile) BMI Smoking Status  138/70 (BP 1 Location: Left arm, BP Patient Position: Sitting) 5' 9\" (1.753 m) 344 lb 11.2 oz (156.4 kg) 50.9 kg/m2 Never Smoker Vitals History BMI and BSA Data Body Mass Index Body Surface Area 50.9 kg/m 2 2.76 m 2 Preferred Pharmacy Pharmacy Name Phone [facility] [phone] Your Updated Medication List  
  
   
This list is accurate as of 2/22/18 11:40 AM.  Always use your most recent med list.  
  
  
  
  
 aspirin 81 mg chewable tablet Take 1 Tab by mouth daily. dofetilide 250 mcg capsule Commonly known as:  Andrés Lemon Take 1 Cap by mouth every twelve (12) hours every twelve (12) hours. Indications: PREVENTION OF RECURRENT ATRIAL FIBRILLATION  
  
 furosemide 40 mg tablet Commonly known as:  LASIX  
1 tab PO daily  
  
 metoprolol succinate 50 mg XL tablet Commonly known as:  TOPROL-XL Take 1 Tab by mouth daily. We Performed the Following AMB POC EKG ROUTINE W/ 12 LEADS, INTER & REP [99877 CPT(R)] MAGNESIUM [id number] CPT(R)] METABOLIC PANEL, BASIC [35534 CPT(R)] Follow-up Instructions Return in about 6 months (around 8/22/2018). Patient Instructions Please get labs done prior to your next visit. Introducing [facility]! Monique Rogers introduces [employer] patient portal. Now you can access parts of your medical record, email your doctor's office, and request medication refills online. 1. In your internet browser, go to [email or web address] 2. Click on the First Time User? Click Here link in the Sign In box. You will see the New Member Sign Up page. 3. Enter your [employer] Access Code exactly as it appears below. You will not need to use this code after youve completed the sign-up process. If you do not sign up before the expiration date, you must request a new code. · [employer] Access Code: [email or web address] Expires: 5/1/2018  4:31 PM 
 
 4. Enter the last four digits of your Social Security Number (xxxx) and Date of Birth (mm/dd/yyyy) as indicated and click Submit. You will be taken to the next sign-up page. 5. Create a ConceptoMed ID. This will be your ConceptoMed login ID and cannot be changed, so think of one that is secure and easy to remember. 6. Create a ConceptoMed password. You can change your password at any time. 7. Enter your Password Reset Question and Answer. This can be used at a later time if you forget your password. 8. Enter your e-mail address. You will receive e-mail notification when new information is available in [address] 9. Click Sign Up. You can now view and download portions of your medical record. 10. Click the Download Summary menu link to download a portable copy of your medical information. If you have questions, please visit the Frequently Asked Questions section of the ConceptoMed website. Remember, [employer] is NOT to be used for urgent needs. For medical emergencies, dial 911. Now available from your iPhone and Android! Please provide this summary of care documentation to your next provider. Your primary care clinician is listed as Gloria Henson. If you have any questions after today's visit, please call [phone].

## 2018-03-19 RX ORDER — METOPROLOL SUCCINATE 50 MG/1
TABLET, EXTENDED RELEASE ORAL
Qty: 90 TAB | Refills: 1 | Status: SHIPPED | OUTPATIENT
Start: 2018-03-19 | End: 2018-09-18 | Stop reason: SDUPTHER

## 2018-03-19 NOTE — TELEPHONE ENCOUNTER
Request for Toprol-XL 50 mg daily. Last office visit 2/22/218, next office visit 8/30/18. Refills per verbal order from Dr. Guillermo Stockton.

## 2018-04-23 RX ORDER — DOFETILIDE 0.25 MG/1
CAPSULE ORAL
Qty: 180 CAP | Refills: 1 | Status: SHIPPED | OUTPATIENT
Start: 2018-04-23 | End: 2018-11-27

## 2018-04-23 NOTE — TELEPHONE ENCOUNTER
Request for Tikosyn 250 mcg BID. Last office visit 2/22/18, next office visit 8/30/18. Refills per verbal order from Dr. Beau Mckeon.

## 2018-05-01 ENCOUNTER — TELEPHONE (OUTPATIENT)
Dept: CARDIOLOGY CLINIC | Age: 53
End: 2018-05-01

## 2018-05-01 NOTE — TELEPHONE ENCOUNTER
Received notification from [address] that the patient requires prior authorization for the dofetilide. A PA request was submitted electronically via the Cover My Meds portal, key #[id number]. Will f/u as needed.

## 2018-05-04 NOTE — TELEPHONE ENCOUNTER
Per cover my [employer] does not require PA for generic tikosyn. Notified pharmacy of this and the stated that they already filled it and that they are aware of this.

## 2018-06-07 ENCOUNTER — TELEPHONE (OUTPATIENT)
Dept: CARDIOLOGY CLINIC | Age: 53
End: 2018-06-07

## 2018-06-07 NOTE — TELEPHONE ENCOUNTER
Received fax from [employer] for a prior auth for Dofetilide. Faxed completed PA request to [employer] at fax number [phone]. Fax confirmation received.

## 2018-06-08 NOTE — TELEPHONE ENCOUNTER
[place] that [place] was approved for patient's Dofetilide from 6/7/18-6/7/20.

## 2018-08-20 RX ORDER — FUROSEMIDE 40 MG/1
TABLET ORAL
Qty: 90 TAB | Refills: 1 | Status: SHIPPED | OUTPATIENT
Start: 2018-08-20 | End: 2019-02-20 | Stop reason: SDUPTHER

## 2018-08-20 NOTE — TELEPHONE ENCOUNTER
Request for Lasix 40 mg daily. Last office visit 2/22/18, next office visit 8/30/18. Refills per verbal order from Dr. Maria T Liz.

## 2018-08-25 LAB
BUN SERPL-MCNC: 15 MG/DL (ref 6–24)
BUN/CREAT SERPL: 16 (ref 9–20)
CALCIUM SERPL-MCNC: 9.2 MG/DL (ref 8.7–10.2)
CHLORIDE SERPL-SCNC: 102 MMOL/L (ref 96–106)
CO2 SERPL-SCNC: 25 MMOL/L (ref 20–29)
CREAT SERPL-MCNC: 0.96 MG/DL (ref 0.76–1.27)
GLUCOSE SERPL-MCNC: 99 MG/DL (ref 65–99)
MAGNESIUM SERPL-MCNC: 2 MG/DL (ref 1.6–2.3)
POTASSIUM SERPL-SCNC: 4.7 MMOL/L (ref 3.5–5.2)
SODIUM SERPL-SCNC: 139 MMOL/L (ref 134–144)

## 2018-08-27 ENCOUNTER — TELEPHONE (OUTPATIENT)
Dept: CARDIOLOGY CLINIC | Age: 53
End: 2018-08-27

## 2018-08-30 ENCOUNTER — OFFICE VISIT (OUTPATIENT)
Dept: CARDIOLOGY CLINIC | Age: 53
End: 2018-08-30

## 2018-08-30 VITALS
RESPIRATION RATE: 16 BRPM | DIASTOLIC BLOOD PRESSURE: 80 MMHG | HEIGHT: 69 IN | OXYGEN SATURATION: 98 % | BODY MASS INDEX: 46.65 KG/M2 | HEART RATE: 65 BPM | WEIGHT: 315 LBS | SYSTOLIC BLOOD PRESSURE: 130 MMHG

## 2018-08-30 DIAGNOSIS — Z51.81 VISIT FOR MONITORING TIKOSYN THERAPY: ICD-10-CM

## 2018-08-30 DIAGNOSIS — I48.3 TYPICAL ATRIAL FLUTTER (HCC): ICD-10-CM

## 2018-08-30 DIAGNOSIS — E66.01 MORBID OBESITY, UNSPECIFIED OBESITY TYPE (HCC): ICD-10-CM

## 2018-08-30 DIAGNOSIS — Z79.899 VISIT FOR MONITORING TIKOSYN THERAPY: ICD-10-CM

## 2018-08-30 DIAGNOSIS — Z98.890 HISTORY OF CARDIOVERSION: ICD-10-CM

## 2018-08-30 DIAGNOSIS — Z79.899 HIGH RISK MEDICATION USE: ICD-10-CM

## 2018-08-30 DIAGNOSIS — I48.19 PERSISTENT ATRIAL FIBRILLATION (HCC): Primary | ICD-10-CM

## 2018-08-30 DIAGNOSIS — I50.22 CHF NYHA CLASS II, CHRONIC, SYSTOLIC (HCC): ICD-10-CM

## 2018-08-30 RX ORDER — METFORMIN HYDROCHLORIDE 500 MG/1
500 TABLET ORAL 2 TIMES DAILY WITH MEALS
COMMUNITY
Start: 2018-08-19 | End: 2022-05-05 | Stop reason: ALTCHOICE

## 2018-08-30 RX ORDER — ATORVASTATIN CALCIUM 10 MG/1
10 TABLET, FILM COATED ORAL DAILY
COMMUNITY
Start: 2018-08-19

## 2018-08-30 NOTE — MR AVS SNAPSHOT
[address] 
[phone] Patient: Aamir Coronado MRN: [id number] :1965 Visit Information Date & Time Provider Department Dept. Phone Encounter #  
 2018  3:20 PM Xavi Carter MD [facility] Leila Steinberg [phone] [id number] Upcoming Health Maintenance Date Due DTaP/Tdap/Td series (1 - Tdap) 10/2/1987 FOBT Q 1 YEAR AGE 50-75 10/2/2016 Influenza Age 5 to Adult 2018 Allergies as of 2018  Review Complete On: 2018 By: Vickie Carpio No Known Allergies Current Immunizations  Reviewed on 2017 No immunizations on file. Not reviewed this visit You Were Diagnosed With   
  
 Codes Comments PAF (paroxysmal atrial fibrillation) ([facility]    -  Primary ICD-10-CM: I48.0 ICD-9-CM: 427.31 Typical atrial flutter (HCC)     ICD-10-CM: I48.3 ICD-9-CM: 427.32   
 CHF NYHA class II, chronic, systolic (HCC)     APE-51-KU: I50.22 ICD-9-CM: 428.22, 428.0 High risk medication use     ICD-10-CM: Z79.899 ICD-9-CM: V58.69 Vitals BP Pulse Resp Height(growth percentile) Weight(growth percentile) SpO2  
 130/80 (BP 1 Location: Left arm, BP Patient Position: Sitting) 65 16 5' 9\" (1.753 m) 329 lb (149.2 kg) 98% BMI Smoking Status 48.58 kg/m2 Never Smoker Vitals History BMI and BSA Data Body Mass Index Body Surface Area 48.58 kg/m 2 2.7 m 2 Preferred Pharmacy Pharmacy Name Phone [facility] [address] [place] [address] [phone] Your Updated Medication List  
  
   
This list is accurate as of 18  3:58 PM.  Always use your most recent med list.  
  
  
  
  
 aspirin 81 mg chewable tablet Take 1 Tab by mouth daily. atorvastatin 10 mg tablet Commonly known as:  LIPITOR Take 10 mg by mouth daily. dofetilide 250 mcg capsule Commonly known as:  TIKOSYN  
TAKE ONE CAPSULE BY MOUTH EVERY 12 HOURS  
  
 furosemide 40 mg tablet Commonly known as:  LASIX TAKE 1 TABLET BY MOUTH DAILY  
  
 metFORMIN 500 mg tablet Commonly known as:  GLUCOPHAGE Take 500 mg by mouth.  
  
 metoprolol succinate 50 mg XL tablet Commonly known as:  TOPROL-XL  
TAKE ONE TABLET BY MOUTH ONCE DAILY We Performed the Following AMB POC EKG ROUTINE W/ 12 LEADS, INTER & REP [66258 CPT(R)] Patient Instructions Stop Tikosyn You will need to follow up in clinic with Dr. Alexandra Sheridan in 6 months. Introducing [facility]! Tianna Mckeon introduces [employer] patient portal. Now you can access parts of your medical record, email your doctor's office, and request medication refills online. 1. In your internet browser, go to [email or web address] 2. Click on the First Time User? Click Here link in the Sign In box. You will see the New Member Sign Up page. 3. Enter your [employer]aster Access Code exactly as it appears below. You will not need to use this code after youve completed the sign-up process. If you do not sign up before the expiration date, you must request a new code. · [employer]aster Access Code: [email or web address] Expires: 11/28/2018  3:37 PM 
 
4. Enter the last four digits of your Social Security Number (xxxx) and Date of Birth (mm/dd/yyyy) as indicated and click Submit. You will be taken to the next sign-up page. 5. Create a Exacaster ID. This will be your Ex[employer]aster login ID and cannot be changed, so think of one that is secure and easy to remember. 6. Create a Ex[employer]aster password. You can change your password at any time. 7. Enter your Password Reset Question and Answer. This can be used at a later time if you forget your password. 8. Enter your e-mail address. You will receive e-mail notification when new information is available in [address] 9. Click Sign Up.  You can now view and download portions of your medical record. 10. Click the Download Summary menu link to download a portable copy of your medical information. If you have questions, please visit the Frequently Asked Questions section of the [employer] website. Remember, [employer] is NOT to be used for urgent needs. For medical emergencies, dial 911. Now available from your iPhone and Android! Please provide this summary of care documentation to your next provider. Your primary care clinician is listed as Paco Franz. If you have any questions after today's visit, please call [phone].

## 2018-08-30 NOTE — PROGRESS NOTES
Cardiac Electrophysiology Office Note     Subjective: Yaneth Luna is a 47 y.o. male patient who is seen today for AFIB follow up. He states he has done well. Has historically been unable to tell when in AF. He does report a very brief episode of palpitations a few days ago, but no other palpitations. He denies chest pain, SOB, PND, orthopnea, syncope, or edema. Low ASYYY4MAQV score, no current anticoagulation other than ASA 81 mg po daily. Recent K 4.7 & Mg 2.0. Previous:  Echo (08/24/2017): LVEF 50-55%. Akinesis of apical wall(s). Mild concentric LV hypertrophy. LA mildly dilated. Hospitalization 07/2017 for atrial fibrillation with RVR and acute systolic CHF LVEF 51%  He had cardioversion after DESTINEE did not show thrombus. He was tachycardic on cardizem IV. He had seen Dr Tomy Haddad and then me back in 2015 when he had AFIB. Tikosyn dose reduced to 250 mcg d/t prolonged Qtc. Social History     Social History    Marital status:      Spouse name: N/A    Number of children: N/A    Years of education: N/A     Occupational History    Not on file.      Social History Main Topics    Smoking status: Never Smoker    Smokeless tobacco: Former User    Alcohol use No      Comment: Occassionally social    Drug use: No    Sexual activity: Not on file     Other Topics Concern    Not on file     Social History Narrative     Family History   Problem Relation Age of Onset    Heart Disease Father     Stroke Mother            Patient Active Problem List    Diagnosis Date Noted    Obesity, morbid ([address] 08/30/2018    Anasarca 07/01/2017    A-fib ([address] 07/01/2017    Acute chest pain 07/01/2017    Pleural effusion, bilateral 07/01/2017    Atrial flutter ([address] 01/29/2015    PAF (paroxysmal atrial fibrillation) ([facility] 12/03/2014    Obesity 12/03/2014    GEE (obstructive sleep apnea) 12/03/2014     Current Outpatient Prescriptions   Medication Sig Dispense Refill  atorvastatin (LIPITOR) 10 mg tablet Take 10 mg by mouth daily.  metFORMIN (GLUCOPHAGE) 500 mg tablet Take 500 mg by mouth.  furosemide (LASIX) 40 mg tablet TAKE 1 TABLET BY MOUTH DAILY 90 Tab 1    dofetilide (TIKOSYN) 250 mcg capsule TAKE ONE CAPSULE BY MOUTH EVERY 12 HOURS 180 Cap 1    metoprolol succinate (TOPROL-XL) 50 mg XL tablet TAKE ONE TABLET BY MOUTH ONCE DAILY 90 Tab 1    aspirin 81 mg chewable tablet Take 1 Tab by mouth daily. 30 Tab 6     No Known Allergies  Past Medical History:   Diagnosis Date    Arrhythmia     Sleep apnea      Past Surgical History:   Procedure Laterality Date    IN CARDIOVERSION ELECTIVE ARRHYTHMIA EXTERNAL  7/3/2017          Family History   Problem Relation Age of Onset    Heart Disease Father     Stroke Mother      Social History   Substance Use Topics    Smoking status: Never Smoker    Smokeless tobacco: Former User    Alcohol use No      Comment: Occassionally social        Review of Systems:   Constitutional: Negative for fever, chills, weight loss. HEENT: Negative for nosebleeds, vision changes. Respiratory: Negative for cough, hemoptysis, sputum production, and wheezing. Cardiovascular: Negative for chest pain, + very rare palpitations, orthopnea, claudication, leg swelling, no syncope, and PND. Gastrointestinal: Negative for nausea, vomiting, diarrhea, constipation, blood in stool and melena. Genitourinary: Negative for dysuria, and hematuria. Musculoskeletal: Negative for myalgias, arthralgia. Skin: Negative for rash. Heme: Does not bleed or bruise easily. Neurological: Negative for speech change and focal weakness     Objective:     Visit Vitals    /80 (BP 1 Location: Left arm, BP Patient Position: Sitting)    Pulse 65    Resp 16    Ht 5' 9\" (1.753 m)    Wt 329 lb (149.2 kg)    SpO2 98%    BMI 48.58 kg/m2      Physical Exam:   Constitutional:  well-nourished. No distress. morbidly Obese.    Head: Normocephalic and atraumatic. Eyes: Pupils are equal, round  Neck: supple. No JVD present. Cardiovascular: Normal rate, regular rhythm. Exam reveals no gallop and no friction rub. No murmur heard. Pulmonary/Chest: Effort normal and breath sounds normal. No wheezes. exam limited d/t body habitus. Abdominal: Soft, morbidly obese  Musculoskeletal: +1 edema. Brown discoloration to BLE   Neurological: alert,oriented. Skin: Skin is warm and dry  Psychiatric: normal mood and affect. Behavior is normal. Judgment and thought content normal.       ECG:  NSR, appears similar to previous. However, difficult to accurately measure QTc, as T waves are very flat in leads II & V5. Assessment/Plan:       ICD-10-CM ICD-9-CM    1. Persistent atrial fibrillation (HCC) I48.1 427.31 AMB POC EKG ROUTINE W/ 12 LEADS, INTER & REP   2. Typical atrial flutter (HCC) I48.3 427.32    3. CHF NYHA class II, chronic, systolic (HCC) T57.48 173.61      428.0    4. High risk medication use Z79.899 V58.69    5. Morbid obesity, unspecified obesity type ([facility] E66.01 278.01    6. Visit for monitoring Tikosyn therapy Z51.81 V58.83 AMB POC EKG ROUTINE W/ 12 LEADS, INTER & REP    Z79.899 V58.69    7. History of cardioversion Z98.890 V15.1 AMB POC EKG ROUTINE W/ 12 LEADS, INTER & REP     Mr. Jaylin Gallardo has had very few symptoms associated with AF since last visit. ECG today with very flat T waves in lead II & V5, unable to accurately measure QTc acurately. Therefore I recommend him discontinue Tikosyn. He will keep medication on hand in case it is needed in the future when ECG QTc can be measured. Continue Toprol XL 50 mg po daily as previously prescribed. Follow up in 6 months, sooner for new/worsening issues. Future Appointments  Date Time Provider Shea Sneed   2/19/2019 4:00 PM Tyra Cochran  [address]       Thank you for involving me in this patient's care and please call with further concerns or questions.       Susan Davison, M.D.  Electrophysiology/Cardiology  [facility] [place]  [address], [address] [place] [address] [address] [place] [address]  [phone]

## 2018-09-19 RX ORDER — METOPROLOL SUCCINATE 50 MG/1
TABLET, EXTENDED RELEASE ORAL
Qty: 90 TAB | Refills: 1 | Status: SHIPPED | OUTPATIENT
Start: 2018-09-19 | End: 2018-11-30

## 2018-09-19 NOTE — TELEPHONE ENCOUNTER
Request for Toprol-XL 50 mg daily. Last office visit 8/30/18, next office visit 2/19/19. Refills per verbal order from Dr. Angel Grewal.

## 2018-11-26 ENCOUNTER — TELEPHONE (OUTPATIENT)
Dept: CARDIOLOGY CLINIC | Age: 53
End: 2018-11-26

## 2018-11-26 NOTE — TELEPHONE ENCOUNTER
Patient called in regarding his Afib. He had a Colonoscopy today and he was in Afib during procedure, running . His GI doctor advised him to see cardiology. Patient denies any sx, shortness of breath, CP, palps. He did state he has been monitoring his heart rate at home and he has been getting 91, 112, 117. Patient takes Toprol XL 50 mg daily. He is not on Tikosyn. Patient requested a call back tomorrow but if develops any sx will go to ED.    2 pt identifiers used

## 2018-11-27 NOTE — TELEPHONE ENCOUNTER
Verified patient with two types of identifiers. Notified patient of possible DCCV for tomorrow at 12:00pm.  Patient is unsure that he can do tomorrow and would like to speak to Dr Sheila Chase prior to scheduling. Dr Sheila Chase spoke to patient on the phone regarding possible DCCV and he has decided to wait and just come in for an appt. Scheduled appt with Dr Sheila Chase 11/29/18 at 1:20pm.  Patient verbalizes understanding. And will call with any questions or concerns.

## 2018-11-27 NOTE — TELEPHONE ENCOUNTER
Per Dr. Anastacia Osman, he can be scheduled for cardioversion tomorrow. Please contact patient to arrange.

## 2018-11-29 ENCOUNTER — OFFICE VISIT (OUTPATIENT)
Dept: CARDIOLOGY CLINIC | Age: 53
End: 2018-11-29

## 2018-11-29 VITALS
HEART RATE: 78 BPM | HEIGHT: 69 IN | DIASTOLIC BLOOD PRESSURE: 62 MMHG | WEIGHT: 315 LBS | SYSTOLIC BLOOD PRESSURE: 100 MMHG | BODY MASS INDEX: 46.65 KG/M2

## 2018-11-29 DIAGNOSIS — I50.22 CHF NYHA CLASS II, CHRONIC, SYSTOLIC (HCC): ICD-10-CM

## 2018-11-29 DIAGNOSIS — E66.01 OBESITY, MORBID (HCC): ICD-10-CM

## 2018-11-29 DIAGNOSIS — I48.19 PERSISTENT ATRIAL FIBRILLATION (HCC): Primary | ICD-10-CM

## 2018-11-29 DIAGNOSIS — I48.3 TYPICAL ATRIAL FLUTTER (HCC): ICD-10-CM

## 2018-11-29 RX ORDER — SODIUM CHLORIDE 0.9 % (FLUSH) 0.9 %
5-10 SYRINGE (ML) INJECTION EVERY 8 HOURS
Status: CANCELLED | OUTPATIENT
Start: 2018-11-29

## 2018-11-29 RX ORDER — ERGOCALCIFEROL 1.25 MG/1
50000 CAPSULE ORAL
COMMUNITY
End: 2019-05-30

## 2018-11-29 RX ORDER — SODIUM CHLORIDE 0.9 % (FLUSH) 0.9 %
5-10 SYRINGE (ML) INJECTION AS NEEDED
Status: CANCELLED | OUTPATIENT
Start: 2018-11-29

## 2018-11-29 NOTE — PROGRESS NOTES
Cardiac Electrophysiology Office Note Subjective: Rodirgo Sampson is a 47 y.o. male patient who is seen today for AFIB follow up. He called on 11/26/2018, reported that he had AF during colonoscopy (rate ). He denied SOB, chest pain, or palpitations. After procedure, he monitored HR at home, was anywhere from 90s-110s. Currently on Toprol XL 50 mg po daily. He states he felt poorly yesterday, had fatigue & chills. He denies any symptoms today, has not had decreased activity tolerance, chest pain, SOB, or lightheadedness. ECG today shows AF with RVR, ventricular rate 133. Rate slower upon initial vital signs measurement. /62, lower than patient's norm (SBP 130s). Has historically been unable to tell when in AF. He denies chest pain, SOB, PND, orthopnea, syncope, or edema. Low [id number] score, no current anticoagulation other than ASA 81 mg po daily. Previous: 
Echo (08/24/2017): LVEF 50-55%. Akinesis of apical wall(s). Mild concentric LV hypertrophy. LA mildly dilated. Hospitalization 07/2017 for atrial fibrillation with RVR and acute systolic CHF LVEF 11%. Had cardioversion after DESTINEE did not show thrombus. He was tachycardic on cardizem IV. He had seen Dr Evette Ramirez and then me back in 2015 when he had AFIB. Tikosyn dose reduced to 250 mcg d/t prolonged Qtc. Subsequently discontinued. Social History Socioeconomic History  Marital status:  Spouse name: Not on file  Number of children: Not on file  Years of education: Not on file  Highest education level: Not on file Social Needs  Financial resource strain: Not on file  Food insecurity - worry: Not on file  Food insecurity - inability: Not on file  Transportation needs - medical: Not on file  Transportation needs - non-medical: Not on file Occupational History  Not on file Tobacco Use  Smoking status: Never Smoker  Smokeless tobacco: Former User Substance and Sexual Activity  Alcohol use: No  
  Alcohol/week: 0.0 oz  
  Comment: Occassionally social  
 Drug use: No  
 Sexual activity: Not on file Other Topics Concern  Not on file Social History Narrative  Not on file Family History Problem Relation Age of Onset  Heart Disease Father  Stroke Mother Patient Active Problem List  
 Diagnosis Date Noted  Obesity, morbid ([facility] 08/30/2018  Anasarca 07/01/2017  A-fib ([facility] 07/01/2017  Acute chest pain 07/01/2017  Pleural effusion, bilateral 07/01/2017  Atrial flutter ([facility] 01/29/2015  PAF (paroxysmal atrial fibrillation) ([facility] 12/03/2014  Obesity 12/03/2014  GEE (obstructive sleep apnea) 12/03/2014 Current Outpatient Medications Medication Sig Dispense Refill  ergocalciferol (VITAMIN D2) 50,000 unit capsule Take 50,000 Units by mouth every seven (7) days.  metoprolol succinate (TOPROL-XL) 50 mg XL tablet TAKE 1 TABLET BY MOUTH ONCE DAILY 90 Tab 1  
 atorvastatin (LIPITOR) 10 mg tablet Take 10 mg by mouth daily.  metFORMIN (GLUCOPHAGE) 500 mg tablet Take 500 mg by mouth.  furosemide (LASIX) 40 mg tablet TAKE 1 TABLET BY MOUTH DAILY 90 Tab 1  
 aspirin 81 mg chewable tablet Take 1 Tab by mouth daily. 30 Tab 6 No Known Allergies Past Medical History:  
Diagnosis Date  Arrhythmia  Sleep apnea Past Surgical History:  
Procedure Laterality Date  [place] CARDIOVERSION ELECTIVE ARRHYTHMIA EXTERNAL  7/3/2017 Family History Problem Relation Age of Onset  Heart Disease Father  Stroke Mother Social History Tobacco Use  Smoking status: Never Smoker  Smokeless tobacco: Former User Substance Use Topics  Alcohol use: No  
  Alcohol/week: 0.0 oz  
  Comment: Occassionally social  
  
 
Review of Systems:  
Constitutional: Negative for fever, chills, weight loss. + recent fatigue HEENT: Negative for nosebleeds, vision changes. Respiratory: Negative for cough, hemoptysis, sputum production, and wheezing. Cardiovascular: Negative for chest pain, + very rare palpitations, orthopnea, claudication, leg swelling, no syncope, and PND. Gastrointestinal: Negative for nausea, vomiting, diarrhea, constipation, blood in stool and melena. Genitourinary: Negative for dysuria, and hematuria. Musculoskeletal: Negative for myalgias, arthralgia. Skin: Negative for rash. Heme: Does not bleed or bruise easily. Neurological: Negative for speech change and focal weakness Objective:  
 
Visit Vitals /62 Pulse 78 Ht 5' 9\" (1.753 m) Wt 325 lb (147.4 kg) BMI 47.99 kg/m² Physical Exam:  
Constitutional:  well-nourished. No distress. morbidly Obese. Head: Normocephalic and atraumatic. Eyes: Pupils are equal, round Neck: supple. No JVD present. Cardiovascular: Tachycardic rate, irregular rhythm. Exam reveals no gallop and no friction rub. No murmur heard. Pulmonary/Chest: Effort normal and breath sounds normal. No wheezes. Abdominal: Soft, morbidly obese. Musculoskeletal: Trace edema. Brown discoloration to BLEs. Neurological: Alert, oriented. Skin: Skin is warm and dry. Psychiatric: Normal mood and affect. Behavior is normal. Judgment and thought content normal.   
  
ECG:  AF, ventricular rate 133. Assessment/Plan: ICD-10-CM ICD-9-CM 1. Persistent atrial fibrillation (HCC) I48.1 427.31 AMB POC EKG ROUTINE W/ 12 LEADS, INTER & REP 2. Typical atrial flutter (HCC) I48.3 427.32 AMB POC EKG ROUTINE W/ 12 LEADS, INTER & REP 3. CHF NYHA class II, chronic, systolic (HCC) M13.42 073.11 AMB POC EKG ROUTINE W/ 12 LEADS, INTER & REP  
  428.0 4. LVH (left ventricular hypertrophy) I51.7 429.3 AMB POC EKG ROUTINE W/ 12 LEADS, INTER & REP Mr. Della Good has AF with intermittent RVR (ventricular rate up to 130s on ECG today). Recommend synchronized cardioversion. As he is currently only taking ASA 81 mg po daily for embolic CVA prophylaxis, he will require DESTINEE to rule left atrial thrombus prior to DCCV. Continue Toprol XL 50 mg po daily as previously prescribed for now. Plan for Multaq 400 mg po bid after DESTINEE/DCCV. He will require Eliquis for embolic CVA prophylaxis x 30 days post procedure. If unable to maintain sinus rhythm post cardioversion, would recommend ablation of atrial fibrillation. Future Appointments Date Time Provider Shea Sneed 2/19/2019  4:00 PM Macario Miller  [address] Thank you for involving me in this patient's care and please call with further concerns or questions. Brianna Willoughby M.D. Electrophysiology/Cardiology [facility] [facility] [place] [facility], [facility] [address] [place] [address] 
[phone]

## 2018-11-29 NOTE — PATIENT INSTRUCTIONS
Your DESTINEE and cardioversion procedure has been scheduled for 11/30/18 at 7:30am, at [facility].    Please report to Admitting Department by 6:15am, or 2 hours prior to your scheduled procedure. Please bring a list of your current medications and medication bottles, if able, to the hospital on this day. You will be unable to drive after your procedure so please make sure to bring someone with you to your procedure. You will need to have nothing to eat or drink after midnight, the night prior to your procedure. You may have small sips of water, if needed, to take with your medication. You should not stop your medications prior to your scheduled procedure. After your procedure, you will need to follow up with Dr. Anton Eid.  Your follow-up appointment has been scheduled for 12/13/18 at 3:40pm.

## 2018-11-30 ENCOUNTER — HOSPITAL ENCOUNTER (OUTPATIENT)
Dept: CARDIAC CATH/INVASIVE PROCEDURES | Age: 53
Discharge: HOME | End: 2018-11-30
Attending: INTERNAL MEDICINE | Admitting: INTERNAL MEDICINE
Payer: COMMERCIAL

## 2018-11-30 VITALS
HEART RATE: 69 BPM | TEMPERATURE: 98.5 F | BODY MASS INDEX: 46.65 KG/M2 | OXYGEN SATURATION: 96 % | WEIGHT: 315 LBS | DIASTOLIC BLOOD PRESSURE: 54 MMHG | RESPIRATION RATE: 16 BRPM | SYSTOLIC BLOOD PRESSURE: 112 MMHG | HEIGHT: 69 IN

## 2018-11-30 PROBLEM — Z01.89 ENCOUNTER FOR CARDIOVERSION PROCEDURE: Status: ACTIVE | Noted: 2018-11-30

## 2018-11-30 LAB
ANION GAP SERPL CALC-SCNC: 9 MMOL/L (ref 5–15)
BASOPHILS # BLD: 0.1 K/UL (ref 0–0.1)
BASOPHILS NFR BLD: 1 % (ref 0–1)
BUN SERPL-MCNC: 20 MG/DL (ref 6–20)
BUN/CREAT SERPL: 19 (ref 12–20)
CALCIUM SERPL-MCNC: 8.8 MG/DL (ref 8.5–10.1)
CHLORIDE SERPL-SCNC: 105 MMOL/L (ref 97–108)
CO2 SERPL-SCNC: 26 MMOL/L (ref 21–32)
CREAT SERPL-MCNC: 1.04 MG/DL (ref 0.7–1.3)
DIFFERENTIAL METHOD BLD: ABNORMAL
EOSINOPHIL # BLD: 0.3 K/UL (ref 0–0.4)
EOSINOPHIL NFR BLD: 3 % (ref 0–7)
ERYTHROCYTE [DISTWIDTH] IN BLOOD BY AUTOMATED COUNT: 15.1 % (ref 11.5–14.5)
GLUCOSE BLD STRIP.AUTO-MCNC: 102 MG/DL (ref 65–100)
GLUCOSE SERPL-MCNC: 103 MG/DL (ref 65–100)
HCT VFR BLD AUTO: 40.3 % (ref 36.6–50.3)
HGB BLD-MCNC: 12.7 G/DL (ref 12.1–17)
IMM GRANULOCYTES # BLD: 0 K/UL (ref 0–0.04)
IMM GRANULOCYTES NFR BLD AUTO: 0 % (ref 0–0.5)
LYMPHOCYTES # BLD: 2.6 K/UL (ref 0.8–3.5)
LYMPHOCYTES NFR BLD: 26 % (ref 12–49)
MCH RBC QN AUTO: 25.8 PG (ref 26–34)
MCHC RBC AUTO-ENTMCNC: 31.5 G/DL (ref 30–36.5)
MCV RBC AUTO: 81.9 FL (ref 80–99)
MONOCYTES # BLD: 0.7 K/UL (ref 0–1)
MONOCYTES NFR BLD: 7 % (ref 5–13)
NEUTS SEG # BLD: 6.3 K/UL (ref 1.8–8)
NEUTS SEG NFR BLD: 63 % (ref 32–75)
NRBC # BLD: 0 K/UL (ref 0–0.01)
NRBC BLD-RTO: 0 PER 100 WBC
PLATELET # BLD AUTO: 251 K/UL (ref 150–400)
PMV BLD AUTO: 10.3 FL (ref 8.9–12.9)
POTASSIUM SERPL-SCNC: 4.1 MMOL/L (ref 3.5–5.1)
RBC # BLD AUTO: 4.92 M/UL (ref 4.1–5.7)
SERVICE CMNT-IMP: ABNORMAL
SODIUM SERPL-SCNC: 140 MMOL/L (ref 136–145)
WBC # BLD AUTO: 10 K/UL (ref 4.1–11.1)

## 2018-11-30 PROCEDURE — 36415 COLL VENOUS BLD VENIPUNCTURE: CPT

## 2018-11-30 PROCEDURE — 77030039046 HC PAD DEFIB RADIOTRNSPNT CNMD -B

## 2018-11-30 PROCEDURE — 93325 DOPPLER ECHO COLOR FLOW MAPG: CPT

## 2018-11-30 PROCEDURE — 82962 GLUCOSE BLOOD TEST: CPT

## 2018-11-30 PROCEDURE — 74011250636 HC RX REV CODE- 250/636: Performed by: INTERNAL MEDICINE

## 2018-11-30 PROCEDURE — 80048 BASIC METABOLIC PNL TOTAL CA: CPT

## 2018-11-30 PROCEDURE — 93005 ELECTROCARDIOGRAM TRACING: CPT

## 2018-11-30 PROCEDURE — 74011250637 HC RX REV CODE- 250/637: Performed by: NURSE PRACTITIONER

## 2018-11-30 PROCEDURE — 74011000250 HC RX REV CODE- 250: Performed by: INTERNAL MEDICINE

## 2018-11-30 PROCEDURE — 85025 COMPLETE CBC W/AUTO DIFF WBC: CPT

## 2018-11-30 PROCEDURE — 92960 CARDIOVERSION ELECTRIC EXT: CPT

## 2018-11-30 RX ORDER — MIDAZOLAM HYDROCHLORIDE 1 MG/ML
.5-1 INJECTION, SOLUTION INTRAMUSCULAR; INTRAVENOUS
Status: DISCONTINUED | OUTPATIENT
Start: 2018-11-30 | End: 2018-11-30

## 2018-11-30 RX ORDER — ATROPINE SULFATE 0.1 MG/ML
.5-1 INJECTION INTRAVENOUS AS NEEDED
Status: DISCONTINUED | OUTPATIENT
Start: 2018-11-30 | End: 2018-11-30

## 2018-11-30 RX ORDER — SODIUM CHLORIDE 0.9 % (FLUSH) 0.9 %
5-10 SYRINGE (ML) INJECTION EVERY 8 HOURS
Status: CANCELLED | OUTPATIENT
Start: 2018-11-30

## 2018-11-30 RX ORDER — SODIUM CHLORIDE 0.9 % (FLUSH) 0.9 %
5-10 SYRINGE (ML) INJECTION AS NEEDED
Status: DISCONTINUED | OUTPATIENT
Start: 2018-11-30 | End: 2018-11-30

## 2018-11-30 RX ORDER — SODIUM CHLORIDE 0.9 % (FLUSH) 0.9 %
5-10 SYRINGE (ML) INJECTION EVERY 8 HOURS
Status: DISCONTINUED | OUTPATIENT
Start: 2018-11-30 | End: 2018-11-30 | Stop reason: HOSPADM

## 2018-11-30 RX ORDER — ONDANSETRON 2 MG/ML
4 INJECTION INTRAMUSCULAR; INTRAVENOUS
Status: CANCELLED | OUTPATIENT
Start: 2018-11-30

## 2018-11-30 RX ORDER — FENTANYL CITRATE 50 UG/ML
25-200 INJECTION, SOLUTION INTRAMUSCULAR; INTRAVENOUS
Status: DISCONTINUED | OUTPATIENT
Start: 2018-11-30 | End: 2018-11-30

## 2018-11-30 RX ORDER — SODIUM CHLORIDE 0.9 % (FLUSH) 0.9 %
5-10 SYRINGE (ML) INJECTION AS NEEDED
Status: CANCELLED | OUTPATIENT
Start: 2018-11-30

## 2018-11-30 RX ADMIN — MIDAZOLAM 3 MG: 1 INJECTION INTRAMUSCULAR; INTRAVENOUS at 07:57

## 2018-11-30 RX ADMIN — FENTANYL CITRATE 50 MCG: 50 INJECTION INTRAMUSCULAR; INTRAVENOUS at 07:49

## 2018-11-30 RX ADMIN — MIDAZOLAM 2 MG: 1 INJECTION INTRAMUSCULAR; INTRAVENOUS at 07:52

## 2018-11-30 RX ADMIN — MIDAZOLAM 3 MG: 1 INJECTION INTRAMUSCULAR; INTRAVENOUS at 07:49

## 2018-11-30 RX ADMIN — FENTANYL CITRATE 50 MCG: 50 INJECTION INTRAMUSCULAR; INTRAVENOUS at 07:58

## 2018-11-30 RX ADMIN — APIXABAN 5 MG: 5 TABLET, FILM COATED ORAL at 07:25

## 2018-11-30 RX ADMIN — BENZOCAINE 2 SPRAY: 200 SPRAY DENTAL; ORAL; PERIODONTAL at 07:52

## 2018-11-30 NOTE — PROGRESS NOTES
TRANSFER - IN REPORT:    Verbal report received from [facility] on Jose Cramer  being received from procedure area for routine progression of care. Report consisted of patients Situation, Background, Assessment and Recommendations(SBAR). Information from the following report(s) SBAR, Procedure Summary, MAR, Recent Results and Cardiac Rhythm NSR was reviewed with the receiving clinician. Opportunity for questions and clarification was provided. Assessment completed upon patients arrival to [address] [place] [address] and care assumed. Cardiac Cath Lab Recovery Arrival Note:    Jose Cramer arrived to [facility] recovery area. Patient procedure= DESTINEE/ DCCV. Patient on cardiac monitor, non-invasive blood pressure, SPO2 monitor. On O2 @ 2 lpm via NC.  IV  of NS on pump at 25 ml/hr. Patient status doing well without problems. Patient is A&Ox 4. Patient reports No Pain. PROCEDURE SITE CHECK:    Procedure site: No pain/discomfort reported at procedure site. No change in patient status. Continue to monitor patient and status.

## 2018-11-30 NOTE — H&P (VIEW-ONLY)
Cardiac Electrophysiology Office Note Subjective: Yadira Smyth is a 47 y.o. male patient who is seen today for AFIB follow up. He called on 11/26/2018, reported that he had AF during colonoscopy (rate ). He denied SOB, chest pain, or palpitations. After procedure, he monitored HR at home, was anywhere from 90s-110s. Currently on Toprol XL 50 mg po daily. He states he felt poorly yesterday, had fatigue & chills. He denies any symptoms today, has not had decreased activity tolerance, chest pain, SOB, or lightheadedness. ECG today shows AF with RVR, ventricular rate 133. Rate slower upon initial vital signs measurement. /62, lower than patient's norm (SBP 130s). Has historically been unable to tell when in AF. He denies chest pain, SOB, PND, orthopnea, syncope, or edema. Low [id number] score, no current anticoagulation other than ASA 81 mg po daily. Previous: 
Echo (08/24/2017): LVEF 50-55%. Akinesis of apical wall(s). Mild concentric LV hypertrophy. LA mildly dilated. Hospitalization 07/2017 for atrial fibrillation with RVR and acute systolic CHF LVEF 47%. Had cardioversion after DESTINEE did not show thrombus. He was tachycardic on cardizem IV. He had seen Dr Alie Fragoso and then me back in 2015 when he had AFIB. Tikosyn dose reduced to 250 mcg d/t prolonged Qtc. Subsequently discontinued. Social History Socioeconomic History  Marital status:  Spouse name: Not on file  Number of children: Not on file  Years of education: Not on file  Highest education level: Not on file Social Needs  Financial resource strain: Not on file  Food insecurity - worry: Not on file  Food insecurity - inability: Not on file  Transportation needs - medical: Not on file  Transportation needs - non-medical: Not on file Occupational History  Not on file Tobacco Use  Smoking status: Never Smoker  Smokeless tobacco: Former User Substance and Sexual Activity  Alcohol use: No  
  Alcohol/week: 0.0 oz  
  Comment: Occassionally social  
 Drug use: No  
 Sexual activity: Not on file Other Topics Concern  Not on file Social History Narrative  Not on file Family History Problem Relation Age of Onset  Heart Disease Father  Stroke Mother Patient Active Problem List  
 Diagnosis Date Noted  Obesity, morbid ([facility] 08/30/2018  Anasarca 07/01/2017  [facility] 07/01/2017  Acute chest pain 07/01/2017  Pleural effusion, bilateral 07/01/2017  Atrial flutter ([facility] 01/29/2015  PAF (paroxysmal atrial fibrillation) ([facility] 12/03/2014  Obesity 12/03/2014  GEE (obstructive sleep apnea) 12/03/2014 Current Outpatient Medications Medication Sig Dispense Refill  ergocalciferol (VITAMIN D2) 50,000 unit capsule Take 50,000 Units by mouth every seven (7) days.  apixaban (ELIQUIS) 5 mg tablet Take 1 Tab by mouth two (2) times a day. 60 Tab 6  
 dronedarone (MULTAQ) tab tablet Take 1 Tab by mouth two (2) times daily (with meals). 60 Tab 6  
 metoprolol succinate (TOPROL-XL) 50 mg XL tablet TAKE 1 TABLET BY MOUTH ONCE DAILY 90 Tab 1  
 atorvastatin (LIPITOR) 10 mg tablet Take 10 mg by mouth daily.  metFORMIN (GLUCOPHAGE) 500 mg tablet Take 500 mg by mouth.  furosemide (LASIX) 40 mg tablet TAKE 1 TABLET BY MOUTH DAILY 90 Tab 1  
 aspirin 81 mg chewable tablet Take 1 Tab by mouth daily. 30 Tab 6 No Known Allergies Past Medical History:  
Diagnosis Date  Arrhythmia  Sleep apnea Past Surgical History:  
Procedure Laterality Date  [place] CARDIOVERSION ELECTIVE ARRHYTHMIA EXTERNAL  7/3/2017 Family History Problem Relation Age of Onset  Heart Disease Father  Stroke Mother Social History Tobacco Use  Smoking status: Never Smoker  Smokeless tobacco: Former User Substance Use Topics  Alcohol use:  No  
 Alcohol/week: 0.0 oz  
  Comment: Occassionally social  
  
 
Review of Systems:  
Constitutional: Negative for fever, chills, weight loss. + recent fatigue HEENT: Negative for nosebleeds, vision changes. Respiratory: Negative for cough, hemoptysis, sputum production, and wheezing. Cardiovascular: Negative for chest pain, + very rare palpitations, orthopnea, claudication, leg swelling, no syncope, and PND. Gastrointestinal: Negative for nausea, vomiting, diarrhea, constipation, blood in stool and melena. Genitourinary: Negative for dysuria, and hematuria. Musculoskeletal: Negative for myalgias, arthralgia. Skin: Negative for rash. Heme: Does not bleed or bruise easily. Neurological: Negative for speech change and focal weakness Objective:  
 
Visit Vitals /62 Pulse 78 Ht 5' 9\" (1.753 m) Wt 325 lb (147.4 kg) BMI 47.99 kg/m² Physical Exam:  
Constitutional:  well-nourished. No distress. morbidly Obese. Head: Normocephalic and atraumatic. Eyes: Pupils are equal, round Neck: supple. No JVD present. Cardiovascular: Tachycardic rate, irregular rhythm. Exam reveals no gallop and no friction rub. No murmur heard. Pulmonary/Chest: Effort normal and breath sounds normal. No wheezes. Abdominal: Soft, morbidly obese. Musculoskeletal: Trace edema. Brown discoloration to BLEs. Neurological: Alert, oriented. Skin: Skin is warm and dry. Psychiatric: Normal mood and affect. Behavior is normal. Judgment and thought content normal.   
  
ECG:  AF, ventricular rate 133. [facility] Assessment/Plan: ICD-10-CM ICD-9-CM 1. Persistent atrial fibrillation (HCC) I48.1 427.31 AMB POC EKG ROUTINE W/ 12 LEADS, INTER & REP 2. Typical atrial flutter (HCC) I48.3 427.32 AMB POC EKG ROUTINE W/ 12 LEADS, INTER & REP 3. CHF NYHA class II, chronic, systolic (HCC) W58.88 133.75 AMB POC EKG ROUTINE W/ 12 LEADS, INTER & REP  
  428.0 4.  [facility], [facility]bid ([facility] E66.01 278.01   
 
 Mr. Evans Zamora has AF with intermittent RVR (ventricular rate up to 130s on ECG today). BP is low, I cannot increase more rate control medication Recommend synchronized cardioversion. As he is currently only taking ASA 81 mg po daily for embolic CVA prophylaxis, he will require DESTINEE to rule left atrial thrombus prior to DCCV because he is not taking NOAC. Continue Toprol XL 50 mg po daily as previously prescribed for now. Plan for Multaq 400 mg po bid after DESTINEE/DCCV then stop toprol. He will require Eliquis for embolic CVA prophylaxis x 30 days post procedure. If unable to maintain sinus rhythm post cardioversion, would recommend ablation of atrial fibrillation. Future Appointments Date Time Provider Shea Sneed 11/30/2018  7:00 AM CATH ROOM 2 [facility]  
12/13/2018  3:40 PM Andra Sawyer  [address]  
2/19/2019  4:00 PM Andra Sawyer  [address] Thank you for involving me in this patient's care and please call with further concerns or questions. Alberto Lawson M.D. Electrophysiology/Cardiology [facility] and [facility] [place] [address], [place] [address], [address] [facility], [address] [place] [address] 
[phone]                                        [phone]

## 2018-11-30 NOTE — PROGRESS NOTES
TRANSFER - OUT REPORT:    Verbal report given to Paco Aguilera RN(name) on Campos Soto  being transferred to (unit) for routine post - op       Report consisted of patients Situation, Background, Assessment and   Recommendations(SBAR). Information from the following report(s) SBAR, Procedure Summary and MAR was reviewed with the receiving nurse. Lines:   Peripheral IV 11/30/18 Anterior;Right Antecubital (Active)        Opportunity for questions and clarification was provided.       Patient transported with:   Registered Nurse

## 2018-11-30 NOTE — PROCEDURES
CARDIOVERSION  Procedure date: 11/30/2018     PROCEDURE:   Electrical synchronized cardioversion of atrial fibrillation. BRIEF HISTORY:  This is a 47 y. o.man with the history of paroxysmal atrial fibrillation but rapid ventricular rate and with increasing dose of toprol, blood pressure is lower. He had been on [place] before. He has not been on eliquis recently so DESTINEE was done and showed no thrombus. The risks and benefits have been discussed with him about cardioversion and  He agreed to proceed. PROCEDURE IN DETAIL:   The patient is now in the supine position and the pads were applied in the anterior posterior direction after DESTINEE. He was given additional 3 mg Versed, 50 mcg of fentanyl. Thereafter the conscious sedation was maintained and 200 joule biphasic shock was delivered in anterior posterior direction and converted to sinus rhythm . The patient was then arousable. COMPLICATIONS:  None. Specimen removed: NONE  ASSESSMENT AND PLAN:  The patient will be discharged home with his wife  Grant joshuamarlyn and Mahogany [address]    Future Appointments   Date Time Provider Shea Mccraryi   12/13/2018  3:40 PM Wilton Ascencio  [address]   2/19/2019  4:00 PM MD Magaly Franco M.D.  [facility]  Electrophysiology/Cardiology  [facility] and Vascular [place]  [address], [place] [address], [address]  [facility], [address]                             [address] [place] [address]  [phone]

## 2018-11-30 NOTE — PROGRESS NOTES
Cardiac Electrophysiology Office Note     Subjective: Ingrid Aldana is a 47 y.o. male patient who is seen today for AFIB follow up. He called on 11/26/2018, reported that he had AF during colonoscopy (rate ). He denied SOB, chest pain, or palpitations. After procedure, he monitored HR at home, was anywhere from 90s-110s. Currently on Toprol XL 50 mg po daily. He states he felt poorly yesterday, had fatigue & chills. He denies any symptoms today, has not had decreased activity tolerance, chest pain, SOB, or lightheadedness. ECG today shows AF with RVR, ventricular rate 133. Rate slower upon initial vital signs measurement. /62, lower than patient's norm (SBP 130s). Has historically been unable to tell when in AF. He denies chest pain, SOB, PND, orthopnea, syncope, or edema. Low [id number] score, no current anticoagulation other than ASA 81 mg po daily. Previous:  Echo (08/24/2017): LVEF 50-55%. Akinesis of apical wall(s). Mild concentric LV hypertrophy. LA mildly dilated. Hospitalization 07/2017 for atrial fibrillation with RVR and acute systolic CHF LVEF 59%. Had cardioversion after DESTINEE did not show thrombus. He was tachycardic on cardizem IV. He had seen Dr Diego Hawk and then me back in 2015 when he had AFIB. Tikosyn dose reduced to 250 mcg d/t prolonged Qtc. Subsequently discontinued.       Social History     Socioeconomic History    Marital status:      Spouse name: Not on file    Number of children: Not on file    Years of education: Not on file    Highest education level: Not on file   Social Needs    Financial resource strain: Not on file    Food insecurity - worry: Not on file    Food insecurity - inability: Not on file    Transportation needs - medical: Not on file   [employer] needs - non-medical: Not on file   Occupational History    Not on file   Tobacco Use    Smoking status: Never Smoker    Smokeless tobacco: Former User   Substance and Sexual Activity    Alcohol use: No     Alcohol/week: 0.0 oz     Comment: Occassionally social    Drug use: No    Sexual activity: Not on file   Other Topics Concern    Not on file   Social History Narrative    Not on file     Family History   Problem Relation Age of Onset    Heart Disease Father     Stroke Mother        Patient Active Problem List    Diagnosis Date Noted    Obesity, morbid ([facility] 08/30/2018    Anasarca 07/01/2017    [facility] 07/01/2017    Acute chest pain 07/01/2017    Pleural effusion, bilateral 07/01/2017    Atrial flutter ([facility] 01/29/2015    PAF (paroxysmal atrial fibrillation) ([facility] 12/03/2014    Obesity 12/03/2014    GEE (obstructive sleep apnea) 12/03/2014     Current Outpatient Medications   Medication Sig Dispense Refill    ergocalciferol (VITAMIN D2) 50,000 unit capsule Take 50,000 Units by mouth every seven (7) days.  apixaban (ELIQUIS) 5 mg tablet Take 1 Tab by mouth two (2) times a day. 60 Tab 6    dronedarone (MULTAQ) tab tablet Take 1 Tab by mouth two (2) times daily (with meals). 60 Tab 6    metoprolol succinate (TOPROL-XL) 50 mg XL tablet TAKE 1 TABLET BY MOUTH ONCE DAILY 90 Tab 1    atorvastatin (LIPITOR) 10 mg tablet Take 10 mg by mouth daily.  metFORMIN (GLUCOPHAGE) 500 mg tablet Take 500 mg by mouth.  furosemide (LASIX) 40 mg tablet TAKE 1 TABLET BY MOUTH DAILY 90 Tab 1    aspirin 81 mg chewable tablet Take 1 Tab by mouth daily.  30 Tab 6     No Known Allergies  Past Medical History:   Diagnosis Date    Arrhythmia     Sleep apnea      Past Surgical History:   Procedure Laterality Date    [place] CARDIOVERSION ELECTIVE ARRHYTHMIA EXTERNAL  7/3/2017          Family History   Problem Relation Age of Onset    Heart Disease Father     Stroke Mother      Social History     Tobacco Use    Smoking status: Never Smoker    Smokeless tobacco: Former User   Substance Use Topics    Alcohol use: No     Alcohol/week: 0.0 oz Comment: Occassionally social        Review of Systems:   Constitutional: Negative for fever, chills, weight loss. + recent fatigue  HEENT: Negative for nosebleeds, vision changes. Respiratory: Negative for cough, hemoptysis, sputum production, and wheezing. Cardiovascular: Negative for chest pain, + very rare palpitations, orthopnea, claudication, leg swelling, no syncope, and PND. Gastrointestinal: Negative for nausea, vomiting, diarrhea, constipation, blood in stool and melena. Genitourinary: Negative for dysuria, and hematuria. Musculoskeletal: Negative for myalgias, arthralgia. Skin: Negative for rash. Heme: Does not bleed or bruise easily. Neurological: Negative for speech change and focal weakness     Objective:     Visit Vitals  /62   Pulse 78   Ht 5' 9\" (1.753 m)   Wt 325 lb (147.4 kg)   BMI 47.99 kg/m²      Physical Exam:   Constitutional:  well-nourished. No distress. morbidly Obese. Head: Normocephalic and atraumatic. Eyes: Pupils are equal, round  Neck: supple. No JVD present. Cardiovascular: Tachycardic rate, irregular rhythm. Exam reveals no gallop and no friction rub. No murmur heard. Pulmonary/Chest: Effort normal and breath sounds normal. No wheezes. Abdominal: Soft, morbidly obese. Musculoskeletal: Trace edema. Brown discoloration to BLEs. Neurological: Alert, oriented. Skin: Skin is warm and dry. Psychiatric: Normal mood and affect. Behavior is normal. Judgment and thought content normal.       ECG:  AF, ventricular rate 133. [facility]    Assessment/Plan:       ICD-10-CM ICD-9-CM    1. Persistent atrial fibrillation (HCC) I48.1 427.31 AMB POC EKG ROUTINE W/ 12 LEADS, INTER & REP   2. Typical atrial flutter (HCC) I48.3 427.32 AMB POC EKG ROUTINE W/ 12 LEADS, INTER & REP   3. CHF NYHA class II, chronic, systolic (HCC) P55.00 736.56 AMB POC EKG ROUTINE W/ 12 LEADS, INTER & REP     428.0    4. Obesity, morbid ([address] E66.01 278.01      Mr. Marcano has AF with intermittent RVR (ventricular rate up to 130s on ECG today). BP is low, I cannot increase more rate control medication  Recommend synchronized cardioversion. As he is currently only taking ASA 81 mg po daily for embolic CVA prophylaxis, he will require DESTINEE to rule left atrial thrombus prior to DCCV because he is not taking NOAC. Continue Toprol XL 50 mg po daily as previously prescribed for now. Plan for Multaq 400 mg po bid after DESTINEE/DCCV then stop toprol. He will require Eliquis for embolic CVA prophylaxis x 30 days post procedure. If unable to maintain sinus rhythm post cardioversion, would recommend ablation of atrial fibrillation. Future Appointments   Date Time Provider Shea Sneed   11/30/2018  7:00 AM CATH ROOM 2 [facility]   12/13/2018  3:40 PM Corey Christiansen  [address]   2/19/2019  4:00 PM Corey Christiansen  [address]       Thank you for involving me in this patient's care and please call with further concerns or questions. Roge Penaloza M.D.   Electrophysiology/Cardiology  [facility] and Vascular [place]  [address], [place] [address], [address]  [address] [facility], [address]                             [address] [place] [address]  [phone]

## 2018-11-30 NOTE — INTERVAL H&P NOTE
H&P Update:  Victoria Banda was seen and examined. History and physical has been reviewed. The patient has been examined.  There have been no significant clinical changes since the completion of the originally dated History and Physical.    Signed By: Louis White MD     November 30, 2018 7:26 AM

## 2018-11-30 NOTE — DISCHARGE INSTRUCTIONS
Discharge Instructions:    No driving until tomorrow due to sedation that you received during procedure. Take first home dose of Eliquis tonight, then twice daily thereafter. Start Multaq tonight. Stop Toprol XL. Follow up as noted below. Future Appointments   Date Time Provider Shea Sneed   12/13/2018  3:40 PM Palmira Amaral  [address]   2/19/2019  4:00 PM Palmira Amaral MD [address] About Cardioversion  What is cardioversion? Cardioversion helps your heart return to a normal rhythm. It treats problems like atrial fibrillation. It is also sometimes used in emergencies. It can correct a fast heartbeat that causes low blood pressure, chest pain, or heart failure. There are two types:  · The electrical type uses an electric current. The current enters your body through patches on your chest or back. · The chemical type uses medicines. The medicine is usually put into your arm through a tube called an IV. How is cardioversion done? Your doctor may ask you to take medicines before the treatment. These help prevent blood clots. Your doctor will watch you closely to make sure that there are no problems. Electrical cardioversion  The electrical procedure is done in a hospital. You will get medicine to help you relax and control the pain. Your doctor will put patches on your chest or back. The patches send an electric current to your heart. This resets your heart rhythm. The electrical part takes about 5 minutes. But you will probably be in the hospital for 1 to 2 hours. You will need to recover from the effects of the sedative medicine. Chemical cardioversion  The chemical procedure is most often done in a hospital. In most cases, the medicine is put into your arm through a tube called an IV. But you may get medicines to take by mouth. You may feel a quick sting or pinch when the IV starts. The procedure usually takes about 4 to 8 hours.   What can you expect after cardioversion? · You can usually go home the same day. You will need someone to drive you home. · Your doctor may have you take medicines daily. These help your heart beat normally and prevent blood clots. · After electrical cardioversion, you may have redness where the patches were. This looks and feels like a sunburn. · Abnormal heart rhythms sometimes come back after cardioversion. Follow-up care is a key part of your treatment and safety. Be sure to make and go to all appointments, and call your doctor if you are having problems. It's also a good idea to know your test results and keep a list of the medicines you take. Where can you learn more? Go to http://loreto-stephanie.info/. Enter I935 in the search box to learn more about \"Learning About Cardioversion. \"  Current as of: December 6, 2017  Content Version: 11.8  © 1029-0339 [employer]. Care instructions adapted under license by [employer] (which disclaims liability or warranty for this information). If you have questions about a medical condition or this instruction, always ask your healthcare professional. [address] any warranty or liability for your use of this information.

## 2018-12-03 LAB
ATRIAL RATE: 71 BPM
CALCULATED P AXIS, ECG09: 24 DEGREES
CALCULATED R AXIS, ECG10: 36 DEGREES
CALCULATED T AXIS, ECG11: 10 DEGREES
DIAGNOSIS, 93000: NORMAL
P-R INTERVAL, ECG05: 150 MS
Q-T INTERVAL, ECG07: 414 MS
QRS DURATION, ECG06: 100 MS
QTC CALCULATION (BEZET), ECG08: 449 MS
VENTRICULAR RATE, ECG03: 71 BPM

## 2018-12-13 ENCOUNTER — OFFICE VISIT (OUTPATIENT)
Dept: CARDIOLOGY CLINIC | Age: 53
End: 2018-12-13

## 2018-12-13 VITALS
HEART RATE: 95 BPM | SYSTOLIC BLOOD PRESSURE: 126 MMHG | BODY MASS INDEX: 46.65 KG/M2 | DIASTOLIC BLOOD PRESSURE: 78 MMHG | HEIGHT: 69 IN | WEIGHT: 315 LBS | OXYGEN SATURATION: 91 % | RESPIRATION RATE: 16 BRPM

## 2018-12-13 DIAGNOSIS — Z98.890 HISTORY OF CARDIOVERSION: ICD-10-CM

## 2018-12-13 DIAGNOSIS — E66.01 OBESITY, MORBID (HCC): ICD-10-CM

## 2018-12-13 DIAGNOSIS — I48.19 PERSISTENT ATRIAL FIBRILLATION (HCC): Primary | ICD-10-CM

## 2018-12-13 DIAGNOSIS — Z79.899 HIGH RISK MEDICATION USE: ICD-10-CM

## 2018-12-13 DIAGNOSIS — G47.33 OSA (OBSTRUCTIVE SLEEP APNEA): ICD-10-CM

## 2018-12-13 RX ORDER — METOPROLOL SUCCINATE 50 MG/1
50 TABLET, EXTENDED RELEASE ORAL DAILY
Qty: 30 TAB | Refills: 5
Start: 2018-12-13 | End: 2019-05-30 | Stop reason: SDUPTHER

## 2018-12-13 NOTE — PROGRESS NOTES
Cardiac Electrophysiology Office Note     Subjective: Elia Beatty is a 47 y.o. male patient who is seen today for AFIB follow up. He had the electrical cardioversion for atrial fibrillation 11/30/2018 and he has been on Multaq. The patient has developed recurrent atrial fibrillation without knowing it today. Previously:  He called on 11/26/2018, reported that he had AF during colonoscopy (rate ). Echo (08/24/2017): LVEF 50-55%. Akinesis of apical wall(s). Mild concentric LV hypertrophy. LA mildly dilated. Hospitalization 07/2017 for atrial fibrillation with RVR and acute systolic CHF LVEF 32%. Had cardioversion after DESTINEE did not show thrombus. He was tachycardic on cardizem IV. He had seen Dr Kirk Salomon and then me back in 2015 when he had AFIB. Tikosyn dose reduced to 250 mcg d/t prolonged Qtc. Subsequently discontinued.       Social History     Socioeconomic History    Marital status:      Spouse name: Not on file    Number of children: Not on file    Years of education: Not on file    Highest education level: Not on file   Social Needs    Financial resource strain: Not on file    Food insecurity - worry: Not on file    Food insecurity - inability: Not on file    Transportation needs - medical: Not on file   [employer] needs - non-medical: Not on file   Occupational History    Not on file   Tobacco Use    Smoking status: Never Smoker    Smokeless tobacco: Former User   Substance and Sexual Activity    Alcohol use: No     Alcohol/week: 0.0 oz     Comment: Occassionally social    Drug use: No    Sexual activity: Not on file   Other Topics Concern    Not on file   Social History Narrative    Not on file     Family History   Problem Relation Age of Onset    Heart Disease Father     Stroke Mother        Patient Active Problem List    Diagnosis Date Noted    Encounter for cardioversion procedure 11/30/2018    Obesity, morbid ([address] 08/30/2018  Anasarca 07/01/2017    [facility]    Acute chest pain 07/01/2017    Pleural effusion, bilateral 07/01/2017    Atrial flutter ([facility] 01/29/2015    PAF (paroxysmal atrial fibrillation) ([facility] 12/03/2014    Obesity 12/03/2014    GEE (obstructive sleep apnea) 12/03/2014     Current Outpatient Medications   Medication Sig Dispense Refill    apixaban (ELIQUIS) 5 mg tablet Take 1 Tab by mouth two (2) times a day. 28 Tab 0    metoprolol succinate (TOPROL-XL) 50 mg XL tablet Take 1 Tab by mouth daily. 30 Tab 5    ergocalciferol (VITAMIN D2) 50,000 unit capsule Take 50,000 Units by mouth every seven (7) days.  dronedarone (MULTAQ) tab tablet Take 1 Tab by mouth two (2) times daily (with meals). 60 Tab 6    atorvastatin (LIPITOR) 10 mg tablet Take 10 mg by mouth daily.  metFORMIN (GLUCOPHAGE) 500 mg tablet Take 500 mg by mouth.  furosemide (LASIX) 40 mg tablet TAKE 1 TABLET BY MOUTH DAILY 90 Tab 1    aspirin 81 mg chewable tablet Take 1 Tab by mouth daily. 30 Tab 6     No Known Allergies  Past Medical History:   Diagnosis Date    Arrhythmia     Sleep apnea      Past Surgical History:   Procedure Laterality Date    [place] CARDIOVERSION ELECTIVE ARRHYTHMIA EXTERNAL  7/3/2017         [place] CARDIOVERSION ELECTIVE ARRHYTHMIA EXTERNAL  11/30/2018          Family History   Problem Relation Age of Onset    Heart Disease Father     Stroke Mother      Social History     Tobacco Use    Smoking status: Never Smoker    Smokeless tobacco: Former User   Substance Use Topics    Alcohol use: No     Alcohol/week: 0.0 oz     Comment: Occassionally social        Review of Systems:   Constitutional: Negative for fever, chills, weight loss. HEENT: Negative for nosebleeds, vision changes. Respiratory: Negative for cough, hemoptysis, sputum production, and wheezing. Cardiovascular: Negative for chest pain, palpitations, orthopnea, claudication, leg swelling, no syncope, and PND.    Gastrointestinal: Negative for nausea, vomiting, diarrhea, constipation, blood in stool and melena. Genitourinary: Negative for dysuria, and hematuria. Musculoskeletal: Negative for myalgias, arthralgia. Skin: Negative for rash. Heme: Does not bleed or bruise easily. Neurological: Negative for speech change and focal weakness     Objective:     Visit Vitals  /78 (BP 1 Location: Left arm, BP Patient Position: Sitting)   Pulse 95   Resp 16   Ht 5' 9\" (1.753 m)   Wt 322 lb (146.1 kg)   SpO2 91%   BMI 47.55 kg/m²      Physical Exam:   Constitutional:  well-nourished. No distress. morbidly Obese. Head: Normocephalic and atraumatic. Eyes: Pupils are equal, round  Neck: supple. No JVD present. Cardiovascular: irregular rhythm. Exam reveals no gallop and no friction rub. No murmur heard. Pulmonary/Chest: Effort normal and breath sounds normal. No wheezes. Abdominal: Soft, morbidly obese. Musculoskeletal: Trace edema. Neurological: Alert, oriented. Skin: Skin is warm and dry. Psychiatric: Normal mood and affect. Behavior is normal. Judgment and thought content normal.       ECG:  AF, NSSTTWc    Assessment/Plan:       ICD-10-CM ICD-9-CM    1. Persistent atrial fibrillation (HCC) I48.1 427.31 AMB POC EKG ROUTINE W/ 12 LEADS, INTER & REP   2. Obesity, morbid ([facility] E66.01 278.01    3. History of cardioversion Z98.890 V15.1    4. High risk medication use Z79.899 V58.69    5. GEE (obstructive sleep apnea) G47.33 327.23      Mr. Marcano has AF with intermittent RVR   After the cardioversion, he has recurrent atrial fibrillation, but he was not symptomatic. He was not aware of it. The ventricular rate is slightly increased. I discussed with him about rate control versus repeat cardioversion with potential for atrial fibrillation ablation. The patient wanted to talk to his wife. He just wants to add the beta-blocker to the [facility] and likely will be considering cardioversion in January.   I explained to him that the Multaq cannot be taken with persistent atrial fibrillation for an extended period of time because of the risks of cardiac death and worsening prognosis. The patient agreed that he will come back in January for EKG repeat and then make his decision on cardioversion versus ablation. [facility]    Future Appointments   Date Time Provider Shea Sneed   1/24/2019  9:40 AM Jennifer Moses  [address]   2/19/2019  4:00 PM Jennifer Moses  [address]       Thank you for involving me in this patient's care and please call with further concerns or questions. Jesus Blanchard M.D.   Electrophysiology/Cardiology  [facility] [place]  [address], [facility] [address], [place] [address] [place] [address]  [phone]

## 2018-12-13 NOTE — PROGRESS NOTES
1. Have you been to the ER, urgent care clinic since your last visit? Hospitalized since your last visit? Yes When: 11/3/0/2018 Where: [address] [place] [address] Reason for visit: Cardioversion procedure    2. Have you seen or consulted any other health care providers outside of the [address] [place] [address] since your last visit? Include any pap smears or colon screening. No    Pt reports Med Rec. Completed. Chief Complaint   Patient presents with    Irregular Heart Beat    Post OP Follow Up     Cardioversion procedure    Medication Problem     Pt states would like to discuss Eliquis.       Visit Vitals  /78 (BP 1 Location: Left arm, BP Patient Position: Sitting)   Pulse 95   Resp 16   Ht 5' 9\" (1.753 m)   Wt 322 lb (146.1 kg)   SpO2 91%   BMI 47.55 kg/m²

## 2018-12-13 NOTE — PATIENT INSTRUCTIONS
Start back Toprol XL 50 mg daily    You will need to follow up in clinic with Dr. Dennis Pendleton in January

## 2019-01-24 ENCOUNTER — OFFICE VISIT (OUTPATIENT)
Dept: CARDIOLOGY CLINIC | Age: 54
End: 2019-01-24

## 2019-01-24 DIAGNOSIS — E66.01 OBESITY, MORBID (HCC): ICD-10-CM

## 2019-01-24 DIAGNOSIS — I48.19 PERSISTENT ATRIAL FIBRILLATION (HCC): Primary | ICD-10-CM

## 2019-02-19 ENCOUNTER — OFFICE VISIT (OUTPATIENT)
Dept: CARDIOLOGY CLINIC | Age: 54
End: 2019-02-19

## 2019-02-19 VITALS
HEIGHT: 69 IN | WEIGHT: 315 LBS | DIASTOLIC BLOOD PRESSURE: 72 MMHG | HEART RATE: 76 BPM | SYSTOLIC BLOOD PRESSURE: 118 MMHG | BODY MASS INDEX: 46.65 KG/M2 | RESPIRATION RATE: 14 BRPM | OXYGEN SATURATION: 96 %

## 2019-02-19 DIAGNOSIS — Z79.899 HIGH RISK MEDICATION USE: ICD-10-CM

## 2019-02-19 DIAGNOSIS — E66.01 OBESITY, MORBID (HCC): ICD-10-CM

## 2019-02-19 DIAGNOSIS — I48.19 PERSISTENT ATRIAL FIBRILLATION (HCC): Primary | ICD-10-CM

## 2019-02-19 DIAGNOSIS — Z98.890 HISTORY OF CARDIOVERSION: ICD-10-CM

## 2019-02-19 NOTE — PROGRESS NOTES
Cardiac Electrophysiology Office Note     Subjective: Fortunato Anton is a 47 y.o. male patient who is seen today for AFIB follow up. He had the electrical cardioversion for atrial fibrillation 11/30/2018 and he has been on Multaq. The patient has developed recurrent atrial fibrillation and has been probably persistent but he does not know it  He denied palpitation RIOJAS, chest pain    Previously:  He called on 11/26/2018, reported that he had AF during colonoscopy (rate ). Echo (08/24/2017): LVEF 50-55%. Akinesis of apical wall(s). Mild concentric LV hypertrophy. LA mildly dilated. Hospitalization 07/2017 for atrial fibrillation with RVR and acute systolic CHF LVEF 19%. Had cardioversion after DESTINEE did not show thrombus. He was tachycardic on cardizem IV. He had seen Dr Osiris Cazares and then me back in 2015 when he had AFIB. Tikosyn dose reduced to 250 mcg d/t prolonged Qtc. Subsequently discontinued.       Social History     Socioeconomic History    Marital status:      Spouse name: Not on file    Number of children: Not on file    Years of education: Not on file    Highest education level: Not on file   Social Needs    Financial resource strain: Not on file    Food insecurity - worry: Not on file    Food insecurity - inability: Not on file    Transportation needs - medical: Not on file   [employer] needs - non-medical: Not on file   Occupational History    Not on file   Tobacco Use    Smoking status: Never Smoker    Smokeless tobacco: Former User   Substance and Sexual Activity    Alcohol use: No     Alcohol/week: 0.0 oz     Comment: Occassionally social    Drug use: No    Sexual activity: Not on file   Other Topics Concern    Not on file   Social History Narrative    Not on file     Family History   Problem Relation Age of Onset    Heart Disease Father     Stroke Mother        Patient Active Problem List    Diagnosis Date Noted    Encounter for cardioversion procedure 11/30/2018    Obesity, morbid ([facility] 08/30/2018    Anasarca 07/01/2017    [facility] 07/01/2017    Acute chest pain 07/01/2017    Pleural effusion, bilateral 07/01/2017    Atrial flutter ([facility] 01/29/2015    PAF (paroxysmal atrial fibrillation) ([facility] 12/03/2014    Obesity 12/03/2014    GEE (obstructive sleep apnea) 12/03/2014     Current Outpatient Medications   Medication Sig Dispense Refill    folic acid/multivit,iron, (CENTRUM PO) Take  by mouth.  metoprolol succinate (TOPROL-XL) 50 mg XL tablet Take 1 Tab by mouth daily. 30 Tab 5    ergocalciferol (VITAMIN D2) 50,000 unit capsule Take 50,000 Units by mouth every seven (7) days.  atorvastatin (LIPITOR) 10 mg tablet Take 10 mg by mouth daily.  metFORMIN (GLUCOPHAGE) 500 mg tablet Take 500 mg by mouth.  furosemide (LASIX) 40 mg tablet TAKE 1 TABLET BY MOUTH DAILY 90 Tab 1    aspirin 81 mg chewable tablet Take 1 Tab by mouth daily. 30 Tab 6    apixaban (ELIQUIS) 5 mg tablet Take 1 Tab by mouth two (2) times a day. (Patient not taking: Reported on 2/19/2019) 28 Tab 0     No Known Allergies  Past Medical History:   Diagnosis Date    Arrhythmia     Sleep apnea      Past Surgical History:   Procedure Laterality Date    [place] CARDIOVERSION ELECTIVE ARRHYTHMIA EXTERNAL  7/3/2017         [place] CARDIOVERSION ELECTIVE ARRHYTHMIA EXTERNAL  11/30/2018          Family History   Problem Relation Age of Onset    Heart Disease Father     Stroke Mother      Social History     Tobacco Use    Smoking status: Never Smoker    Smokeless tobacco: Former User   Substance Use Topics    Alcohol use: No     Alcohol/week: 0.0 oz     Comment: Occassionally social        Review of Systems:   Constitutional: Negative for fever, chills, weight loss. HEENT: Negative for nosebleeds, vision changes. Respiratory: Negative for cough, hemoptysis, sputum production, and wheezing.    Cardiovascular: Negative for chest pain, palpitations, orthopnea, claudication, leg swelling, no syncope, and PND. Gastrointestinal: Negative for nausea, vomiting, diarrhea, constipation, blood in stool and melena. Genitourinary: Negative for dysuria, and hematuria. Musculoskeletal: Negative for myalgias, arthralgia. Skin: Negative for rash. Heme: Does not bleed or bruise easily. Neurological: Negative for speech change and focal weakness     Objective:     Visit Vitals  /72 (BP 1 Location: Left arm, BP Patient Position: Sitting)   Pulse 76   Resp 14   Ht 5' 9\" (1.753 m)   Wt 321 lb 3.2 oz (145.7 kg)   SpO2 96%   BMI 47.43 kg/m²      Physical Exam:   Constitutional:  well-nourished. No distress. morbidly Obese. Head: Normocephalic and atraumatic. Eyes: Pupils are equal, round  Neck: supple. No JVD present. Cardiovascular: irregular rhythm. Exam reveals no gallop and no friction rub. No murmur heard. Pulmonary/Chest: Effort normal and breath sounds normal. No wheezes. Abdominal: Soft, morbidly obese. Musculoskeletal: Trace edema. Neurological: Alert, oriented. Skin: Skin is warm and dry. Psychiatric: Normal mood and affect. Behavior is normal. Judgment and thought content normal.       ECG:  AF, ventricular rate controlled    Assessment/Plan:       ICD-10-CM ICD-9-CM    1. Persistent atrial fibrillation (HCC) I48.1 427.31 AMB POC EKG ROUTINE W/ 12 LEADS, INTER & REP   2. Obesity, morbid ([facility] E66.01 278.01    3. High risk medication use Z79.899 V58.69      Mr. Marcano has rate controlled with persistent atrial fibrillation, but he is not symptomatic. He will stop Multaq and stay on toprol  I discussed with him about rate control versus repeat cardioversion with potential for atrial fibrillation ablation. The patient prefers to continue with rate control and I agree since he is not symptomatic   He will call back if he has symptoms  Continue with aspirin as his CHADS2 vasc score zero.   I did caution him that his LVEF had been low with rapid rate so we may change assessment as time goes on    Future Appointments   Date Time Provider Shea Nedra   5/30/2019  3:40 PM Enid Esparza  [address]         Thank you for involving me in this patient's care and please call with further concerns or questions. Chris Haynes M.D.   Electrophysiology/Cardiology  [facility] [place]  [address], [place] [address], [address]  [facility], [address]                             [address] [place] [address]  [phone]

## 2019-02-21 RX ORDER — FUROSEMIDE 40 MG/1
TABLET ORAL
Qty: 90 TAB | Refills: 1 | Status: SHIPPED | OUTPATIENT
Start: 2019-02-21 | End: 2019-09-03 | Stop reason: SDUPTHER

## 2019-02-21 NOTE — TELEPHONE ENCOUNTER
Requested Prescriptions     Signed Prescriptions Disp Refills    furosemide (LASIX) 40 mg tablet 90 Tab 1     Sig: TAKE 1 TABLET BY MOUTH  DAILY     Authorizing Provider: CONCEPCION ALCALA     Ordering User: Mamie Julian       Per Dr. Sarah Teresa   Date Time Provider [facility]   5/30/2019  3:40 PM Arlin Powell  [address]

## 2019-04-25 RX ORDER — METOPROLOL SUCCINATE 50 MG/1
TABLET, EXTENDED RELEASE ORAL
Qty: 90 TAB | Refills: 1 | Status: SHIPPED | OUTPATIENT
Start: 2019-04-25 | End: 2019-10-29 | Stop reason: SDUPTHER

## 2019-04-25 NOTE — TELEPHONE ENCOUNTER
Request for Toprol 50 mg daily. Last office visit 2/19/19, next office visit 5/30/19. Refills per verbal order from Dr. Augustus Ruth.

## 2019-05-30 ENCOUNTER — OFFICE VISIT (OUTPATIENT)
Dept: CARDIOLOGY CLINIC | Age: 54
End: 2019-05-30

## 2019-05-30 VITALS
WEIGHT: 315 LBS | BODY MASS INDEX: 46.65 KG/M2 | SYSTOLIC BLOOD PRESSURE: 108 MMHG | OXYGEN SATURATION: 95 % | RESPIRATION RATE: 14 BRPM | HEIGHT: 69 IN | HEART RATE: 99 BPM | DIASTOLIC BLOOD PRESSURE: 78 MMHG

## 2019-05-30 DIAGNOSIS — E66.01 OBESITY, MORBID (HCC): ICD-10-CM

## 2019-05-30 DIAGNOSIS — I48.19 PERSISTENT ATRIAL FIBRILLATION (HCC): Primary | ICD-10-CM

## 2019-05-30 DIAGNOSIS — E11.9 TYPE 2 DIABETES MELLITUS WITHOUT COMPLICATION, WITHOUT LONG-TERM CURRENT USE OF INSULIN (HCC): ICD-10-CM

## 2019-05-30 NOTE — PROGRESS NOTES
Cardiac Electrophysiology Office Note     Subjective: Dirk Pepe is a 47 y.o. male patient who is seen today for AFIB follow up. He had the electrical cardioversion for atrial fibrillation 11/30/2018 and he has been on Multaq. The patient has developed recurrent atrial fibrillation and has been persistent but he does not know it  He denied palpitation RIOJAS, chest pain  He said his DM2 controlled and he has labs every 3 months with Dr Saint Bitters  He has no symptoms associated with AFIB. I showed him the symptoms that most patients may feel    Previous DESTINEE LVEF 45-50%    Previously:  He called on 11/26/2018, reported that he had AF during colonoscopy (rate ). Echo (08/24/2017): LVEF 50-55%. Akinesis of apical wall(s). Mild concentric LV hypertrophy. LA mildly dilated. Hospitalization 07/2017 for atrial fibrillation with RVR and acute systolic CHF LVEF 88%. Had cardioversion after DESTINEE did not show thrombus. He was tachycardic on cardizem IV. He had seen Dr Rio Luong and then me back in 2015 when he had AFIB. Tikosyn dose reduced to 250 mcg d/t prolonged Qtc. Subsequently discontinued.       Social History     Socioeconomic History    Marital status:      Spouse name: Not on file    Number of children: Not on file    Years of education: Not on file    Highest education level: Not on file   Occupational History    Not on file   Social Needs    Financial resource strain: Not on file    Food insecurity:     Worry: Not on file     Inability: Not on file    Transportation needs:     Medical: Not on file     Non-medical: Not on file   Tobacco Use    Smoking status: Never Smoker    Smokeless tobacco: Former User   Substance and Sexual Activity    Alcohol use: No     Alcohol/week: 0.0 oz     Comment: Occassionally social    Drug use: No    Sexual activity: Not on file   Lifestyle    Physical activity:     Days per week: Not on file     Minutes per session: Not on file  Stress: Not on file   Relationships    Social connections:     Talks on phone: Not on file     Gets together: Not on file     Attends Christian service: Not on file     Active member of club or organization: Not on file     Attends meetings of clubs or organizations: Not on file     Relationship status: Not on file    Intimate partner violence:     Fear of current or ex partner: Not on file     Emotionally abused: Not on file     Physically abused: Not on file     Forced sexual activity: Not on file   Other Topics Concern    Not on file   Social History Narrative    Not on file     Family History   Problem Relation Age of Onset    Heart Disease Father     Stroke Mother        Patient Active Problem List    Diagnosis Date Noted    Encounter for cardioversion procedure 11/30/2018    Ob[facility], morbid ([facility] 08/30/2018    Anasarca 07/01/2017    A-fib ([facility] 07/01/2017    Acute chest pain 07/01/2017    Pleural effusion, bilateral 07/01/2017    Atrial flutter ([facility] 01/29/2015    PAF (paroxysmal atrial fibrillation) ([facility] 12/03/2014    Obesity 12/03/2014    GEE (obstructive sleep apnea) 12/03/2014     Current Outpatient Medications   Medication Sig Dispense Refill    metoprolol succinate (TOPROL-XL) 50 mg XL tablet TAKE 1 TABLET BY MOUTH ONCE DAILY 90 Tab 1    furosemide (LASIX) 40 mg tablet TAKE 1 TABLET BY MOUTH  DAILY 90 Tab 1    folic acid/multivit,iron, (CENTRUM PO) Take  by mouth.  atorvastatin (LIPITOR) 10 mg tablet Take 10 mg by mouth daily.  metFORMIN (GLUCOPHAGE) 500 mg tablet Take 500 mg by mouth.  aspirin 81 mg chewable tablet Take 1 Tab by mouth daily.  30 Tab 6     No Known Allergies  Past Medical History:   Diagnosis Date    Arrhythmia     Sleep apnea      Past Surgical History:   Procedure Laterality Date    [place] CARDIOVERSION ELECTIVE ARRHYTHMIA EXTERNAL  7/3/2017         [place] CARDIOVERSION ELECTIVE ARRHYTHMIA EXTERNAL  11/30/2018          Family History   Problem Relation Age of Onset    Heart Disease Father     Stroke Mother      Social History     Tobacco Use    Smoking status: Never Smoker    Smokeless tobacco: Former User   Substance Use Topics    Alcohol use: No     Alcohol/week: 0.0 oz     Comment: Occassionally social        Review of Systems:   Constitutional: Negative for fever, chills, weight loss. HEENT: Negative for nosebleeds, vision changes. Respiratory: Negative for cough, hemoptysis, sputum production, and wheezing. Cardiovascular: Negative for chest pain, palpitations, orthopnea, claudication, + chronic leg swelling, no syncope, and PND. Gastrointestinal: Negative for nausea, vomiting, diarrhea, constipation, blood in stool and melena. Genitourinary: Negative for dysuria, and hematuria. Musculoskeletal: Negative for myalgias, arthralgia. Skin: Negative for rash. Heme: Does not bleed or bruise easily. Neurological: Negative for speech change and focal weakness     Objective:     Visit Vitals  /78 (BP 1 Location: Left arm, BP Patient Position: Sitting)   Pulse 99   Resp 14   Ht 5' 9\" (1.753 m)   Wt 322 lb (146.1 kg)   SpO2 95%   BMI 47.55 kg/m²      Physical Exam:   Constitutional:  well-nourished. No distress. morbidly Obese. Head: Normocephalic and atraumatic. Eyes: Pupils are equal, round  Neck: supple. No JVD present. Cardiovascular: irregular rhythm. Exam reveals no gallop and no friction rub. No murmur heard. Pulmonary/Chest: Effort normal and breath sounds normal. No wheezes. Abdominal: Soft, morbidly obese. Musculoskeletal: 1+ edema. Neurological: Alert, oriented. Skin: Skin is warm and dry. Psychiatric: Normal mood and affect. Behavior is normal. Judgment and thought content normal.         Assessment/Plan:       ICD-10-CM ICD-9-CM    1. Persistent atrial fibrillation (HCC) I48.1 427.31    2. Obesity, morbid ([facility] E66.01 278.01    3.  Type 2 diabetes mellitus without complication, without long-term current use of insulin ([facility] E11.9 250.00      Mr. Marcano has rate controlled with persistent atrial fibrillation, but he is not symptomatic. He had stopped Multaq and stay on toprol and his ventricular rate is fast when checking in but he said he took the steps and avoided elevator  I discussed with him about rate control versus repeat cardioversion with potential for atrial fibrillation ablation. The patient prefers to continue with rate control and I agree since he is not symptomatic   He will call back if he has symptoms  Continue with aspirin as his CHADS2 vasc score one and he denied hypertension. .  I did caution him that his LVEF had been low with rapid rate so we may change assessment as time goes on. He has stopped eliquis  He agrees to 6 month follow up  May need echo again for LVEF  Thank you for involving me in this patient's care and please call with further concerns or questions. Estrada Venegas M.D.   Electrophysiology/Cardiology  [facility] [place]  [address], [address] [place] [address]  [phone]

## 2019-06-06 ENCOUNTER — DOCUMENTATION ONLY (OUTPATIENT)
Dept: CARDIOLOGY CLINIC | Age: 54
End: 2019-06-06

## 2019-06-06 NOTE — PROGRESS NOTES
Received labs dated 04/10/2019.       WBC 8.3  Hgb 12.9  Hct 40  Plt 220      Na 141  K 4.4  Cl 103  Glu 109  BUN 17  Cr 1.05  ALT 21  AST 17  Total bili 1.3      Hgb A1c 6.8      Total chol 123  Tri 106  HDL 38  LDL 64      Mg 2      TSH 1.51  T4 0.89  T3 1.06

## 2019-09-03 RX ORDER — FUROSEMIDE 40 MG/1
TABLET ORAL
Qty: 90 TAB | Refills: 1 | Status: SHIPPED | OUTPATIENT
Start: 2019-09-03 | End: 2020-03-04

## 2019-09-03 NOTE — TELEPHONE ENCOUNTER
Request for Lasix 40 mg daily. Last office visit 5/30/19, next office visit 12/10/19. Refills per verbal order from Dr. Brittany Freed.

## 2019-10-31 RX ORDER — METOPROLOL SUCCINATE 50 MG/1
TABLET, EXTENDED RELEASE ORAL
Qty: 90 TAB | Refills: 1 | Status: SHIPPED | OUTPATIENT
Start: 2019-10-31 | End: 2020-05-06

## 2019-10-31 NOTE — TELEPHONE ENCOUNTER
Request for Toprol 50 mg daily. Last office visit 5/30/19, next office visit 12/10/19. Refills per verbal order from Dr. Melanie Cortze

## 2019-12-10 ENCOUNTER — OFFICE VISIT (OUTPATIENT)
Dept: CARDIOLOGY CLINIC | Age: 54
End: 2019-12-10

## 2019-12-10 VITALS
BODY MASS INDEX: 46.65 KG/M2 | DIASTOLIC BLOOD PRESSURE: 60 MMHG | SYSTOLIC BLOOD PRESSURE: 102 MMHG | HEIGHT: 69 IN | WEIGHT: 315 LBS

## 2019-12-10 DIAGNOSIS — I48.19 PERSISTENT ATRIAL FIBRILLATION (HCC): Primary | ICD-10-CM

## 2019-12-10 DIAGNOSIS — R06.00 DYSPNEA, UNSPECIFIED TYPE: ICD-10-CM

## 2019-12-10 DIAGNOSIS — I48.3 TYPICAL ATRIAL FLUTTER (HCC): ICD-10-CM

## 2019-12-10 DIAGNOSIS — E66.01 MORBID OBESITY WITH BMI OF 45.0-49.9, ADULT (HCC): ICD-10-CM

## 2019-12-10 DIAGNOSIS — G47.33 OSA (OBSTRUCTIVE SLEEP APNEA): ICD-10-CM

## 2019-12-10 RX ORDER — DIGOXIN 250 MCG
0.25 TABLET ORAL DAILY
Qty: 30 TAB | Refills: 5 | Status: SHIPPED | OUTPATIENT
Start: 2019-12-10 | End: 2019-12-16

## 2019-12-10 NOTE — PROGRESS NOTES
Cardiac Electrophysiology Office Note     Subjective: Nigel Salinas is a 49 y.o. male patient who is seen today for follow up of atrial fibrillation. Failed Tikosyn & Multaq, on Toprol XL for rate control. NYHA II-III symptoms. He reports fatigue x 1-2 weeks, SOB with walking from one end of the house to the other. States he had previously been asymptomatic with AF/AFL. He brings a log of HR, BP, & daily weights. At least 2-3 days per month, he has noted elevated resting HR up to 130s. SBP typically low 100s. He has also gained several pounds recently despite diuretic. Also reports nagging cough. Taking ASA 81 mg, has [id number] 1 (denies HTN, but had lower LVEF with rapid rate). States currently being treated for anemia by PCP. Last Hgb 12.9 in [facility] in 04/2019, but states had labs done more recently by PCP. Previous:  S/p synchronized cardioversion for AF 11/30/2018 & 07/03/2017. DESTINEE (11/30/2018): LVEF 45-50%, mild diffuse hypokinesis. Mod SILVIANO. Mild MR. Mild TR. Echo (08/24/2017): LVEF 50-55%, akinesis of apical wall(s), mild concentric LVH. LA mildly dilated. Previous Multaq & Tikosyn. Hospitalization 07/2017 for atrial fibrillation with RVR and acute systolic CHF LVEF 90%. Had cardioversion after DESTINEE did not show thrombus. He was tachycardic on cardizem IV. Social History     Socioeconomic History    Marital status:      Spouse name: Not on file    Number of children: Not on file    Years of education: Not on file    Highest education level: Not on file   Occupational History    Not on file   Social Needs    Financial resource strain: Not on file    Food insecurity:     Worry: Not on file     Inability: Not on file    Transportation needs:     Medical: Not on file     Non-medical: Not on file   Tobacco Use    Smoking status: Never Smoker    Smokeless tobacco: Former User   Substance and Sexual Activity    Alcohol use:  No Alcohol/week: 0.0 standard drinks     Comment: Occassionally social    Drug use: No    Sexual activity: Not on file   Lifestyle    Physical activity:     Days per week: Not on file     Minutes per session: Not on file    Stress: Not on file   Relationships    Social connections:     Talks on phone: Not on file     Gets together: Not on file     Attends Rastafarian service: Not on file     Active member of club or organization: Not on file     Attends meetings of clubs or organizations: Not on file     Relationship status: Not on file    Intimate partner violence:     Fear of current or ex partner: Not on file     Emotionally abused: Not on file     Physically abused: Not on file     Forced sexual activity: Not on file   Other Topics Concern    Not on file   Social History Narrative    Not on file     Family History   Problem Relation Age of Onset    Heart Disease Father     Stroke Mother        Patient Active Problem List    Diagnosis Date Noted    Encounter for cardioversion procedure 11/30/2018    Obesity, morbid ([facility] 08/30/2018    Anasarca 07/01/2017    A-fib ([facility] 07/01/2017    Acute chest pain 07/01/2017    Pleural effusion, bilateral 07/01/2017    Atrial flutter ([facility] 01/29/2015    PAF (paroxysmal atrial fibrillation) ([facility] 12/03/2014    Obesity 12/03/2014    GEE (obstructive sleep apnea) 12/03/2014     Current Outpatient Medications   Medication Sig Dispense Refill    ferrous sulfate (SLOW FE) 142 mg (45 mg iron) ER tablet Take  by mouth Daily (before breakfast).  digoxin (LANOXIN) 0.25 mg tablet Take 1 Tab by mouth daily. 30 Tab 5    metoprolol succinate (TOPROL-XL) 50 mg XL tablet TAKE 1 TABLET BY MOUTH ONCE DAILY 90 Tab 1    furosemide (LASIX) 40 mg tablet TAKE 1 TABLET BY MOUTH  DAILY 90 Tab 1    atorvastatin (LIPITOR) 10 mg tablet Take 10 mg by mouth daily.  metFORMIN (GLUCOPHAGE) 500 mg tablet Take 500 mg by mouth.       aspirin 81 mg chewable tablet Take 1 Tab by mouth daily. 30 Tab 6     No Known Allergies  Past Medical History:   Diagnosis Date    Arrhythmia     Sleep apnea      Past Surgical History:   Procedure Laterality Date    CO CARDIOVERSION ELECTIVE ARRHYTHMIA EXTERNAL  7/3/2017         CO CARDIOVERSION ELECTIVE ARRHYTHMIA EXTERNAL  11/30/2018          Family History   Problem Relation Age of Onset    Heart Disease Father     Stroke Mother      Social History     Tobacco Use    Smoking status: Never Smoker    Smokeless tobacco: Former User   Substance Use Topics    Alcohol use: No     Alcohol/week: 0.0 standard drinks     Comment: Occassionally social        Review of Systems:   Constitutional: Negative for fever, chills, weight loss. + fatigue. HEENT: Negative for nosebleeds, vision changes. Respiratory: + cough, no hemoptysis, sputum production, and wheezing. Cardiovascular: Negative for chest pain, palpitations, orthopnea, claudication, + increased leg swelling, no syncope, and no PND.  + dyspnea with minimal exertion. Gastrointestinal: Negative for nausea, vomiting, diarrhea, constipation, blood in stool and melena. Genitourinary: Negative for dysuria, and hematuria. Musculoskeletal: Negative for myalgias, arthralgia. Skin: Negative for rash. Heme: Does not bleed or bruise easily. Neurological: Negative for speech change and focal weakness     Objective:     Visit Vitals  /60   Ht 5' 9\" (1.753 m)   Wt 325 lb (147.4 kg)   BMI 47.99 kg/m²      Physical Exam:   Constitutional:  well-nourished. No distress. morbidly Obese. Head: Normocephalic and atraumatic. Eyes: Pupils are equal, round. Neck: Supple. No JVD present. Cardiovascular: Tachycardic rate, irregular rhythm. Exam reveals no gallop and no friction rub. No murmur heard. Pulmonary/Chest: Effort normal and breath sounds normal. No wheezes. Abdominal: Soft, morbidly obese. Musculoskeletal: 1+ edema. Neurological: Alert, oriented.    Skin: Skin is warm and dry.  Psychiatric: Normal mood and affect. Behavior is normal. Judgment and thought content normal.      ECG:  Typical atrial flutter with ventricular rate 142 bpm.    Assessment/Plan:       ICD-10-CM ICD-9-CM    1. Persistent atrial fibrillation I48.19 427.31 AMB POC EKG ROUTINE W/ 12 LEADS, INTER & REP      CBC WITH AUTOMATED DIFF      METABOLIC PANEL, BASIC   2. Typical atrial flutter (HCC) I48.3 427.32 AMB POC EKG ROUTINE W/ 12 LEADS, INTER & REP      CBC WITH AUTOMATED DIFF      METABOLIC PANEL, BASIC   3. GEE (obstructive sleep apnea) G47.33 327.23 AMB POC EKG ROUTINE W/ 12 LEADS, INTER & REP      CBC WITH AUTOMATED DIFF      METABOLIC PANEL, BASIC   4. Dyspnea, unspecified type R06.00 786.09 CBC WITH AUTOMATED DIFF      METABOLIC PANEL, BASIC   5. Morbid obesity with BMI of 45.0-49.9, adult ([facility] E66.01 278.01     Z68.42 V85.42      Mr. Marcano has hx of persistent AF/but now with typical AFL with RVR,V  rate 142 today despite Toprol XL 50 mg po daily. He has had weight gain, increased fatigue, & dyspnea on mild exertion for 1-2 weeks. He is on lasix    HR & BP logs show resting tachycardia at least 2-3 days per month for the last several months. /60 today, similar to at home. Unable to increase Toprol XL due to BP. Add digoxin 0.25 mg po daily short term for further ventricular rate control. Check BMP & CBC, last results dated 04/2019. Currently treated for anemia. He does not know why and said he did not have any GI bleed   will wait for lab results to determine whether to start Eliquis for embolic CVA prophylaxis. NYHA II-III, suspect reduced LVEF due to tachycardia. Last known LVEF 45-50% in 11/2018. Need to reestablish sinus rhythm/rate control. Risks/benefits of DESTINEE & synchronized cardioversion reviewed. He agrees to schedule. Will plan to utilize amiodarone thereafter for maintenance of sinus rhythm, as he has failed [address] previously.   He had his history of cardioversion in the past and this may be the last attempt. He needs it now because rate will be difficult to control with medication given his blood pressure not on the high side    His weight is a significant contributor to his inability to maintain NSR at this point. Risks/benefits of ablation discussed. He is aware that he would likely require both endocardial & epicardial ablation of atrial fibrillation if he opts to aim for long term restoration of sinus rhythm. Also discussed AV node ablation for long term rate control, would require pacemaker in that case. Given his age, only recommend AV node ablation & pacemaker if he is unwilling to undergo AF ablations. he will need more than one ablation     Plan for DESTINEE/CV early next week Monday because of the lab schedule   he will consider other options thereafter. EP clinic follow up as noted below. Future Appointments   Date Time Provider Shea Sneed   12/16/2019  7:30 AM [facility] CATH LAB 3 [facility]   1/9/2020  4:20 PM Abdiel Baker  [address]     Thank you for involving me in this patient's care and please call with further concerns or questions. Chris Maldonado M.D.   Electrophysiology/Cardiology  [facility] [place]  [address], [place] [address], [address]  [address]  [phone]

## 2019-12-10 NOTE — PATIENT INSTRUCTIONS
Start Digoxin 0.25 mg daily    Your DESTINEE/Cardioversion procedure has been scheduled for 12/16/19 at 7:30 am, at [facility].    Please report to Admitting Department by 6:15 am, or 2 hours prior to your scheduled procedure. Please bring a list of your current medications and medication bottles, if able, to the hospital on this day. You will be unable to drive after your procedure so please make sure to bring someone with you to your procedure. You will need to have nothing to eat or drink after midnight, the night prior to your procedure. You may have small sips of water, if needed, to take with your medication. You will need labs drawn prior to your procedure. Please go to [facility] to have this done as soon as you area able    You should not stop your medication prior to your scheduled procedure. After your procedure, you will need to follow up with Dr. Alvin Lopez.  Your follow-up appointment has been scheduled for 1/9/20 at 4:20 pm.

## 2019-12-10 NOTE — PROGRESS NOTES
Cardiac Electrophysiology Office Note Subjective: Nigel Salinas is a 49 y.o. male patient who is seen today for follow up of atrial fibrillation. Failed Tikosyn & Multaq, on Toprol XL for rate control. NYHA II-III symptoms. He reports fatigue x 1-2 weeks, SOB with walking from one end of the house to the other. States he had previously been asymptomatic with AF/AFL. He brings a log of HR, BP, & daily weights. At least 2-3 days per month, he has noted elevated resting HR up to 130s. SBP typically low 100s. He has also gained several pounds recently despite diuretic. Also reports nagging cough. Taking ASA 81 mg, has [id number] 1 (denies HTN, but had lower LVEF with rapid rate). States currently being treated for anemia by PCP. Last Hgb 12.9 in [facility] in 04/2019, but states had labs done more recently by PCP. Previous: S/p synchronized cardioversion for AF 11/30/2018 & 07/03/2017. DESTINEE (11/30/2018): LVEF 45-50%, mild diffuse hypokinesis. Mod SILVIANO. Mild MR. Mild TR. Echo (08/24/2017): LVEF 50-55%, akinesis of apical wall(s), mild concentric LVH. LA mildly dilated. Previous Multaq & Tikosyn. Hospitalization 07/2017 for atrial fibrillation with RVR and acute systolic CHF LVEF 85%. Had cardioversion after DESTINEE did not show thrombus. He was tachycardic on cardizem IV. Social History Socioeconomic History  Marital status:  Spouse name: Not on file  Number of children: Not on file  Years of education: Not on file  Highest education level: Not on file Occupational History  Not on file Social Needs  Financial resource strain: Not on file  Food insecurity:  
  Worry: Not on file Inability: Not on file  Transportation needs:  
  Medical: Not on file Non-medical: Not on file Tobacco Use  Smoking status: Never Smoker  Smokeless tobacco: Former User Substance and Sexual Activity  Alcohol use:  No  
 Alcohol/week: 0.0 standard drinks Comment: Occassionally social  
 Drug use: No  
 Sexual activity: Not on file Lifestyle  Physical activity:  
  Days per week: Not on file Minutes per session: Not on file  Stress: Not on file Relationships  Social connections:  
  Talks on phone: Not on file Gets together: Not on file Attends Orthodoxy service: Not on file Active member of club or organization: Not on file Attends meetings of clubs or organizations: Not on file Relationship status: Not on file  Intimate partner violence:  
  Fear of current or ex partner: Not on file Emotionally abused: Not on file Physically abused: Not on file Forced sexual activity: Not on file Other Topics Concern  Not on file Social History Narrative  Not on file Family History Problem Relation Age of Onset  Heart Disease Father  Stroke Mother Patient Active Problem List  
 Diagnosis Date Noted  Encounter for cardioversion procedure 11/30/2018  Obesity, morbid ([facility] 08/30/2018  Anasarca 07/01/2017  A-fib ([facility] 07/01/2017  Acute chest pain 07/01/2017  Pleural effusion, bilateral 07/01/2017  Atrial flutter ([facility] 01/29/2015  PAF (paroxysmal atrial fibrillation) ([facility] 12/03/2014  Obesity 12/03/2014  GEE (obstructive sleep apnea) 12/03/2014 Current Outpatient Medications Medication Sig Dispense Refill  ferrous sulfate (SLOW FE) 142 mg (45 mg iron) ER tablet Take  by mouth Daily (before breakfast).  digoxin (LANOXIN) 0.25 mg tablet Take 1 Tab by mouth daily. 30 Tab 5  
 metoprolol succinate (TOPROL-XL) 50 mg XL tablet TAKE 1 TABLET BY MOUTH ONCE DAILY 90 Tab 1  
 furosemide (LASIX) 40 mg tablet TAKE 1 TABLET BY MOUTH  DAILY 90 Tab 1  
 atorvastatin (LIPITOR) 10 mg tablet Take 10 mg by mouth daily.  metFORMIN (GLUCOPHAGE) 500 mg tablet Take 500 mg by mouth.  aspirin 81 mg chewable tablet Take 1 Tab by mouth daily. 30 Tab 6 No Known Allergies Past Medical History:  
Diagnosis Date  Arrhythmia  Sleep apnea Past Surgical History:  
Procedure Laterality Date  CO CARDIOVERSION ELECTIVE ARRHYTHMIA EXTERNAL  7/3/2017  CO CARDIOVERSION ELECTIVE ARRHYTHMIA EXTERNAL  11/30/2018 Family History Problem Relation Age of Onset  Heart Disease Father  Stroke Mother Social History Tobacco Use  Smoking status: Never Smoker  Smokeless tobacco: Former User Substance Use Topics  Alcohol use: No  
  Alcohol/week: 0.0 standard drinks Comment: Occassionally social  
  
 
Review of Systems:  
Constitutional: Negative for fever, chills, weight loss. + fatigue. HEENT: Negative for nosebleeds, vision changes. Respiratory: + cough, no hemoptysis, sputum production, and wheezing. Cardiovascular: Negative for chest pain, palpitations, orthopnea, claudication, + increased leg swelling, no syncope, and no PND.  + dyspnea with minimal exertion. Gastrointestinal: Negative for nausea, vomiting, diarrhea, constipation, blood in stool and melena. Genitourinary: Negative for dysuria, and hematuria. Musculoskeletal: Negative for myalgias, arthralgia. Skin: Negative for rash. Heme: Does not bleed or bruise easily. Neurological: Negative for speech change and focal weakness Objective:  
 
Visit Vitals /60 Ht 5' 9\" (1.753 m) Wt 325 lb (147.4 kg) BMI 47.99 kg/m² Physical Exam:  
Constitutional:  well-nourished. No distress. morbidly Obese. Head: Normocephalic and atraumatic. Eyes: Pupils are equal, round. Neck: Supple. No JVD present. Cardiovascular: Tachycardic rate, irregular rhythm. Exam reveals no gallop and no friction rub. No murmur heard. Pulmonary/Chest: Effort normal and breath sounds normal. No wheezes. Abdominal: Soft, morbidly obese. Musculoskeletal: 1+ edema. Neurological: Alert, oriented. Skin: Skin is warm and dry. Psychiatric: Normal mood and affect. Behavior is normal. Judgment and thought content normal.   
 
ECG:  AF/ bpm. 
 
Assessment/Plan: ICD-10-CM ICD-9-CM 1. Persistent atrial fibrillation I48.19 427.31 AMB POC EKG ROUTINE W/ 12 LEADS, INTER & REP  
   CBC WITH AUTOMATED DIFF  
   METABOLIC PANEL, BASIC 2. Typical atrial flutter (HCC) I48.3 427.32 AMB POC EKG ROUTINE W/ 12 LEADS, INTER & REP  
   CBC WITH AUTOMATED DIFF  
   METABOLIC PANEL, BASIC 3. GEE (obstructive sleep apnea) G47.33 327.23 AMB POC EKG ROUTINE W/ 12 LEADS, INTER & REP  
   CBC WITH AUTOMATED DIFF  
   METABOLIC PANEL, BASIC 4. Dyspnea, unspecified type R06.00 786.09 CBC WITH AUTOMATED DIFF  
   METABOLIC PANEL, BASIC 5. Morbid obesity with BMI of 45.0-49.9, adult ([facility] E66.01 278.01   
 Z68.42 V85.42   
 
Mr. Marcano has persistent AF/AFL with RVR, rate 142 today despite Toprol XL 50 mg po daily. He has had weight gain, increased fatigue, & dyspnea on mild exertion for 1-2 weeks. HR & BP logs show resting tachycardia at least 2-3 days per month for the last several months. /60 today, similar to at home. Unable to increase Toprol XL due to BP. Add digoxin 0.25 mg po daily short term. Check BMP & CBC, last results dated 04/2019. Currently treated for anemia. Will wait for lab results to determine whether to start Eliquis for embolic CVA prophylaxis. NYHA II-III, suspect reduced LVEF due to tachycardia. Last known LVEF 45-50% in 11/2018. Need to reestablish sinus rhythm/rate control. Risks/benefits of DESTINEE & synchronized cardioversion reviewed. He agrees to schedule. Will plan to utilize amiodarone thereafter for maintenance of sinus rhythm, as he has failed [address] previously. His weight is a significant contributor to his inability to maintain NSR at this point. Risks/benefits of ablation discussed.   He is aware that he would likely require both endocardial & epicardial ablation of atrial fibrillation if he opts to aim for long term restoration of sinus rhythm. Also discussed AV node ablation for long term rate control, would require pacemaker in that case. Given his age, only recommend AV node ablation & pacemaker if he is unwilling to undergo 2 AF ablations. Plan for DESTINEE/CV early next week. He will consider other options thereafter. EP clinic follow up as noted below. Future Appointments Date Time Provider Shea Sneed 12/16/2019  7:30 AM [address] [place] [address] CATH LAB 3 [facility]  
1/9/2020  4:20 PM Miguel Angel Steen  [address] Thank you for involving me in this patient's care and please call with further concerns or questions. Zach Sosa M.D. Electrophysiology/Cardiology [facility] [address], [place] [address] [place] [address] 
[phone]                                        [phone]

## 2019-12-10 NOTE — H&P (VIEW-ONLY)
Cardiac Electrophysiology Office Note Subjective: Jaden Ponce is a 49 y.o. male patient who is seen today for follow up of atrial fibrillation. Failed Tikosyn & Multaq, on Toprol XL for rate control. NYHA II-III symptoms. He reports fatigue x 1-2 weeks, SOB with walking from one end of the house to the other. States he had previously been asymptomatic with AF/AFL. He brings a log of HR, BP, & daily weights. At least 2-3 days per month, he has noted elevated resting HR up to 130s. SBP typically low 100s. He has also gained several pounds recently despite diuretic. Also reports nagging cough. Taking ASA 81 mg, has [id number] 1 (denies HTN, but had lower LVEF with rapid rate). States currently being treated for anemia by PCP. Last Hgb 12.9 in [facility] in 04/2019, but states had labs done more recently by PCP. Previous: S/p synchronized cardioversion for AF 11/30/2018 & 07/03/2017. DESTINEE (11/30/2018): LVEF 45-50%, mild diffuse hypokinesis. Mod SILVIANO. Mild MR. Mild TR. Echo (08/24/2017): LVEF 50-55%, akinesis of apical wall(s), mild concentric LVH. LA mildly dilated. Previous Multaq & Tikosyn. Hospitalization 07/2017 for atrial fibrillation with RVR and acute systolic CHF LVEF 26%. Had cardioversion after DESTINEE did not show thrombus. He was tachycardic on cardizem IV. Social History Socioeconomic History  Marital status:  Spouse name: Not on file  Number of children: Not on file  Years of education: Not on file  Highest education level: Not on file Occupational History  Not on file Social Needs  Financial resource strain: Not on file  Food insecurity:  
  Worry: Not on file Inability: Not on file  Transportation needs:  
  Medical: Not on file Non-medical: Not on file Tobacco Use  Smoking status: Never Smoker  Smokeless tobacco: Former User Substance and Sexual Activity  Alcohol use:  No  
 Alcohol/week: 0.0 standard drinks Comment: Occassionally social  
 Drug use: No  
 Sexual activity: Not on file Lifestyle  Physical activity:  
  Days per week: Not on file Minutes per session: Not on file  Stress: Not on file Relationships  Social connections:  
  Talks on phone: Not on file Gets together: Not on file Attends Zoroastrianism service: Not on file Active member of club or organization: Not on file Attends meetings of clubs or organizations: Not on file Relationship status: Not on file  Intimate partner violence:  
  Fear of current or ex partner: Not on file Emotionally abused: Not on file Physically abused: Not on file Forced sexual activity: Not on file Other Topics Concern  Not on file Social History Narrative  Not on file Family History Problem Relation Age of Onset  Heart Disease Father  Stroke Mother Patient Active Problem List  
 Diagnosis Date Noted  Encounter for cardioversion procedure 11/30/2018  Obesity, morbid ([facility] [address] 08/30/2018  Anasarca 07/01/2017  A-fib ([facility] [address] 07/01/2017  Acute chest pain 07/01/2017  Pleural effusion, bilateral 07/01/2017  Atrial flutter ([facility] [address] 01/29/2015  PAF (paroxysmal atrial fibrillation) ([facility] [address] 12/03/2014  Obesity 12/03/2014  GEE (obstructive sleep apnea) 12/03/2014 Current Outpatient Medications Medication Sig Dispense Refill  ferrous sulfate (SLOW FE) 142 mg (45 mg iron) ER tablet Take  by mouth Daily (before breakfast).  digoxin (LANOXIN) 0.25 mg tablet Take 1 Tab by mouth daily. 30 Tab 5  
 metoprolol succinate (TOPROL-XL) 50 mg XL tablet TAKE 1 TABLET BY MOUTH ONCE DAILY 90 Tab 1  
 furosemide (LASIX) 40 mg tablet TAKE 1 TABLET BY MOUTH  DAILY 90 Tab 1  
 atorvastatin (LIPITOR) 10 mg tablet Take 10 mg by mouth daily.  metFORMIN (GLUCOPHAGE) 500 mg tablet Take 500 mg by mouth.  aspirin 81 mg chewable tablet Take 1 Tab by mouth daily. 30 Tab 6 No Known Allergies Past Medical History:  
Diagnosis Date  Arrhythmia  Sleep apnea Past Surgical History:  
Procedure Laterality Date  [place] CARDIOVERSION ELECTIVE ARRHYTHMIA EXTERNAL  7/3/2017  [place] CARDIOVERSION ELECTIVE ARRHYTHMIA EXTERNAL  11/30/2018 Family History Problem Relation Age of Onset  Heart Disease Father  Stroke Mother Social History Tobacco Use  Smoking status: Never Smoker  Smokeless tobacco: Former User Substance Use Topics  Alcohol use: No  
  Alcohol/week: 0.0 standard drinks Comment: Occassionally social  
  
 
Review of Systems:  
Constitutional: Negative for fever, chills, weight loss. + fatigue. HEENT: Negative for nosebleeds, vision changes. Respiratory: + cough, no hemoptysis, sputum production, and wheezing. Cardiovascular: Negative for chest pain, palpitations, orthopnea, claudication, + increased leg swelling, no syncope, and no PND.  + dyspnea with minimal exertion. Gastrointestinal: Negative for nausea, vomiting, diarrhea, constipation, blood in stool and melena. Genitourinary: Negative for dysuria, and hematuria. Musculoskeletal: Negative for myalgias, arthralgia. Skin: Negative for rash. Heme: Does not bleed or bruise easily. Neurological: Negative for speech change and focal weakness Objective:  
 
Visit Vitals /60 Ht 5' 9\" (1.753 m) Wt 325 lb (147.4 kg) BMI 47.99 kg/m² Physical Exam:  
Constitutional:  well-nourished. No distress. morbidly Obese. Head: Normocephalic and atraumatic. Eyes: Pupils are equal, round. Neck: Supple. No JVD present. Cardiovascular: Tachycardic rate, irregular rhythm. Exam reveals no gallop and no friction rub. No murmur heard. Pulmonary/Chest: Effort normal and breath sounds normal. No wheezes. Abdominal: Soft, morbidly obese. Musculoskeletal: 1+ edema. Neurological: Alert, oriented. Skin: Skin is warm and dry. Psychiatric: Normal mood and affect. Behavior is normal. Judgment and thought content normal.   
 
ECG:  Typical atrial flutter with ventricular rate 142 bpm. 
 
Assessment/Plan: ICD-10-CM ICD-9-CM 1. Persistent atrial fibrillation I48.19 427.31 AMB POC EKG ROUTINE W/ 12 LEADS, INTER & REP  
   CBC WITH AUTOMATED DIFF  
   METABOLIC PANEL, BASIC 2. Typical atrial flutter (HCC) I48.3 427.32 AMB POC EKG ROUTINE W/ 12 LEADS, INTER & REP  
   CBC WITH AUTOMATED DIFF  
   METABOLIC PANEL, BASIC 3. GEE (obstructive sleep apnea) G47.33 327.23 AMB POC EKG ROUTINE W/ 12 LEADS, INTER & REP  
   CBC WITH AUTOMATED DIFF  
   METABOLIC PANEL, BASIC 4. Dyspnea, unspecified type R06.00 786.09 CBC WITH AUTOMATED DIFF  
   METABOLIC PANEL, BASIC 5. Morbid obesity with BMI of 45.0-49.9, adult ([facility] E66.01 278.01   
 Z68.42 V85.42   
 
Mr. Marcano has hx of persistent AF/but now with typical AFL with RVR,V  rate 142 today despite Toprol XL 50 mg po daily. He has had weight gain, increased fatigue, & dyspnea on mild exertion for 1-2 weeks. He is on lasix HR & BP logs show resting tachycardia at least 2-3 days per month for the last several months. /60 today, similar to at home. Unable to increase Toprol XL due to BP. Add digoxin 0.25 mg po daily short term for further ventricular rate control. Check BMP & CBC, last results dated 04/2019. Currently treated for anemia. He does not know why and said he did not have any GI bleed  
will wait for lab results to determine whether to start Eliquis for embolic CVA prophylaxis. NYHA II-III, suspect reduced LVEF due to tachycardia. Last known LVEF 45-50% in 11/2018. Need to reestablish sinus rhythm/rate control. Risks/benefits of DESTINEE & synchronized cardioversion reviewed. He agrees to schedule.   Will plan to utilize amiodarone thereafter for maintenance of sinus rhythm, as he has failed [address] previously. He had his history of cardioversion in the past and this may be the last attempt. He needs it now because rate will be difficult to control with medication given his blood pressure not on the high side His weight is a significant contributor to his inability to maintain NSR at this point. Risks/benefits of ablation discussed. He is aware that he would likely require both endocardial & epicardial ablation of atrial fibrillation if he opts to aim for long term restoration of sinus rhythm. Also discussed AV node ablation for long term rate control, would require pacemaker in that case. Given his age, only recommend AV node ablation & pacemaker if he is unwilling to undergo AF ablations. he will need more than one ablation Plan for DESTINEE/CV early next week Monday because of the lab schedule  
he will consider other options thereafter. EP clinic follow up as noted below. Future Appointments Date Time Provider Shea Sneed 12/16/2019  7:30 AM [facility] CATH LAB 3 [facility]  
1/9/2020  4:20 PM Luz Vergara  [address] Thank you for involving me in this patient's care and please call with further concerns or questions. Lilibeth Ramirez M.D. Electrophysiology/Cardiology [address] [facility] and [facility] [address], [place] [address] [place] [address] 
[phone]

## 2019-12-11 LAB
BASOPHILS # BLD AUTO: 0.1 X10E3/UL (ref 0–0.2)
BASOPHILS NFR BLD AUTO: 1 %
BUN SERPL-MCNC: 16 MG/DL (ref 6–24)
BUN/CREAT SERPL: 13 (ref 9–20)
CALCIUM SERPL-MCNC: 9.7 MG/DL (ref 8.7–10.2)
CHLORIDE SERPL-SCNC: 100 MMOL/L (ref 96–106)
CO2 SERPL-SCNC: 28 MMOL/L (ref 20–29)
CREAT SERPL-MCNC: 1.22 MG/DL (ref 0.76–1.27)
EOSINOPHIL # BLD AUTO: 0.2 X10E3/UL (ref 0–0.4)
EOSINOPHIL NFR BLD AUTO: 2 %
ERYTHROCYTE [DISTWIDTH] IN BLOOD BY AUTOMATED COUNT: 13.6 % (ref 12.3–15.4)
GLUCOSE SERPL-MCNC: 108 MG/DL (ref 65–99)
HCT VFR BLD AUTO: 39.8 % (ref 37.5–51)
HGB BLD-MCNC: 12.4 G/DL (ref 13–17.7)
IMM GRANULOCYTES # BLD AUTO: 0 X10E3/UL (ref 0–0.1)
IMM GRANULOCYTES NFR BLD AUTO: 0 %
LYMPHOCYTES # BLD AUTO: 2.9 X10E3/UL (ref 0.7–3.1)
LYMPHOCYTES NFR BLD AUTO: 28 %
MCH RBC QN AUTO: 25.2 PG (ref 26.6–33)
MCHC RBC AUTO-ENTMCNC: 31.2 G/DL (ref 31.5–35.7)
MCV RBC AUTO: 81 FL (ref 79–97)
MONOCYTES # BLD AUTO: 0.7 X10E3/UL (ref 0.1–0.9)
MONOCYTES NFR BLD AUTO: 7 %
NEUTROPHILS # BLD AUTO: 6.2 X10E3/UL (ref 1.4–7)
NEUTROPHILS NFR BLD AUTO: 62 %
PLATELET # BLD AUTO: 263 X10E3/UL (ref 150–450)
POTASSIUM SERPL-SCNC: 5.2 MMOL/L (ref 3.5–5.2)
RBC # BLD AUTO: 4.92 X10E6/UL (ref 4.14–5.8)
SODIUM SERPL-SCNC: 142 MMOL/L (ref 134–144)
WBC # BLD AUTO: 10.2 X10E3/UL (ref 3.4–10.8)

## 2019-12-11 RX ORDER — SODIUM CHLORIDE 0.9 % (FLUSH) 0.9 %
5-40 SYRINGE (ML) INJECTION AS NEEDED
Status: CANCELLED | OUTPATIENT
Start: 2019-12-11

## 2019-12-11 RX ORDER — SODIUM CHLORIDE 0.9 % (FLUSH) 0.9 %
5-40 SYRINGE (ML) INJECTION EVERY 8 HOURS
Status: CANCELLED | OUTPATIENT
Start: 2019-12-11

## 2019-12-13 ENCOUNTER — TELEPHONE (OUTPATIENT)
Dept: CARDIOLOGY CLINIC | Age: 54
End: 2019-12-13

## 2019-12-13 NOTE — TELEPHONE ENCOUNTER
Patient is calling with questions in regards to his 12/16 appt. Please call back asap.       Phone: [phone]

## 2019-12-13 NOTE — TELEPHONE ENCOUNTER
Called Pt. Two patient identifiers confirmed. Pt wanted to know what would happen if he got there and was not in A-fib would Dr. Antoine Sanches still Cardiovert. Notified Pt that it  would not be possible if he was in NSR. Pt verbalized understanding of information discussed w/ no further questions at this time.

## 2019-12-16 ENCOUNTER — HOSPITAL ENCOUNTER (OUTPATIENT)
Dept: CARDIAC CATH/INVASIVE PROCEDURES | Age: 54
Discharge: HOME | End: 2019-12-16
Attending: INTERNAL MEDICINE | Admitting: INTERNAL MEDICINE
Payer: COMMERCIAL

## 2019-12-16 VITALS
HEIGHT: 69 IN | WEIGHT: 315 LBS | HEART RATE: 68 BPM | TEMPERATURE: 98.2 F | BODY MASS INDEX: 46.65 KG/M2 | RESPIRATION RATE: 18 BRPM | SYSTOLIC BLOOD PRESSURE: 115 MMHG | OXYGEN SATURATION: 97 % | DIASTOLIC BLOOD PRESSURE: 71 MMHG

## 2019-12-16 DIAGNOSIS — I48.19 ATRIAL FIBRILLATION, PERSISTENT (HCC): ICD-10-CM

## 2019-12-16 LAB
ATRIAL RATE: 77 BPM
CALCULATED P AXIS, ECG09: 50 DEGREES
CALCULATED R AXIS, ECG10: 15 DEGREES
CALCULATED T AXIS, ECG11: 157 DEGREES
DIAGNOSIS, 93000: NORMAL
GLUCOSE BLD STRIP.AUTO-MCNC: 113 MG/DL (ref 65–100)
P-R INTERVAL, ECG05: 152 MS
Q-T INTERVAL, ECG07: 394 MS
QRS DURATION, ECG06: 96 MS
QTC CALCULATION (BEZET), ECG08: 445 MS
SERVICE CMNT-IMP: ABNORMAL
VENTRICULAR RATE, ECG03: 77 BPM

## 2019-12-16 PROCEDURE — 92960 CARDIOVERSION ELECTRIC EXT: CPT | Performed by: INTERNAL MEDICINE

## 2019-12-16 PROCEDURE — 99152 MOD SED SAME PHYS/QHP 5/>YRS: CPT | Performed by: INTERNAL MEDICINE

## 2019-12-16 PROCEDURE — 82962 GLUCOSE BLOOD TEST: CPT

## 2019-12-16 PROCEDURE — 93005 ELECTROCARDIOGRAM TRACING: CPT

## 2019-12-16 PROCEDURE — 77030039046 HC PAD DEFIB RADIOTRNSPNT CNMD -B: Performed by: INTERNAL MEDICINE

## 2019-12-16 PROCEDURE — 92960 CARDIOVERSION ELECTRIC EXT: CPT

## 2019-12-16 PROCEDURE — 93312 ECHO TRANSESOPHAGEAL: CPT

## 2019-12-16 RX ORDER — SODIUM CHLORIDE 0.9 % (FLUSH) 0.9 %
5-40 SYRINGE (ML) INJECTION EVERY 8 HOURS
Status: DISCONTINUED | OUTPATIENT
Start: 2019-12-16 | End: 2019-12-16 | Stop reason: HOSPADM

## 2019-12-16 RX ORDER — SODIUM CHLORIDE 0.9 % (FLUSH) 0.9 %
5-40 SYRINGE (ML) INJECTION AS NEEDED
Status: DISCONTINUED | OUTPATIENT
Start: 2019-12-16 | End: 2019-12-16 | Stop reason: HOSPADM

## 2019-12-16 RX ORDER — MIDAZOLAM HYDROCHLORIDE 1 MG/ML
INJECTION, SOLUTION INTRAMUSCULAR; INTRAVENOUS AS NEEDED
Status: DISCONTINUED | OUTPATIENT
Start: 2019-12-16 | End: 2019-12-16 | Stop reason: HOSPADM

## 2019-12-16 RX ORDER — ACETAMINOPHEN 325 MG/1
650 TABLET ORAL
Status: DISCONTINUED | OUTPATIENT
Start: 2019-12-16 | End: 2019-12-16 | Stop reason: HOSPADM

## 2019-12-16 RX ORDER — ONDANSETRON 2 MG/ML
4 INJECTION INTRAMUSCULAR; INTRAVENOUS
Status: DISCONTINUED | OUTPATIENT
Start: 2019-12-16 | End: 2019-12-16 | Stop reason: HOSPADM

## 2019-12-16 RX ORDER — HYDROCODONE BITARTRATE AND ACETAMINOPHEN 5; 325 MG/1; MG/1
1 TABLET ORAL
Status: DISCONTINUED | OUTPATIENT
Start: 2019-12-16 | End: 2019-12-16 | Stop reason: HOSPADM

## 2019-12-16 RX ORDER — AMIODARONE HYDROCHLORIDE 200 MG/1
TABLET ORAL
Qty: 120 TAB | Refills: 1 | Status: SHIPPED | OUTPATIENT
Start: 2019-12-16 | End: 2020-06-19

## 2019-12-16 RX ORDER — FENTANYL CITRATE 50 UG/ML
INJECTION, SOLUTION INTRAMUSCULAR; INTRAVENOUS AS NEEDED
Status: DISCONTINUED | OUTPATIENT
Start: 2019-12-16 | End: 2019-12-16 | Stop reason: HOSPADM

## 2019-12-16 RX ADMIN — MIDAZOLAM HYDROCHLORIDE 2 MG: 1 INJECTION, SOLUTION INTRAMUSCULAR; INTRAVENOUS at 07:57

## 2019-12-16 RX ADMIN — FENTANYL CITRATE 50 MCG: 50 INJECTION, SOLUTION INTRAMUSCULAR; INTRAVENOUS at 07:47

## 2019-12-16 RX ADMIN — MIDAZOLAM HYDROCHLORIDE 3 MG: 1 INJECTION, SOLUTION INTRAMUSCULAR; INTRAVENOUS at 07:47

## 2019-12-16 RX ADMIN — MIDAZOLAM HYDROCHLORIDE 3 MG: 1 INJECTION, SOLUTION INTRAMUSCULAR; INTRAVENOUS at 07:50

## 2019-12-16 NOTE — DISCHARGE INSTRUCTIONS
Discharge Instructions Post DESTINEE & Cardioversion    Do not drive x 1 day due to sedation medication that you received. Stop aspirin & digoxin. Start Eliquis 5 mg by mouth twice daily. Start amiodarone 400 mg by mouth twice daily x 2 weeks, then 400 mg po daily x 2 weeks, then 200 mg po daily thereafter. Follow up in EP clinic as noted below. Future Appointments   Date Time Provider Shea Sneed   1/9/2020  4:20 PM MD Ruby Gauthier Chi, M.D.   Electrophysiology/Cardiology  [facility] [place]  [address], [facility] [address] [place] [address]  [phone]

## 2019-12-16 NOTE — PROGRESS NOTES
TRANSFER - IN REPORT:    Verbal report received from [facility] on Nancy Epps  being received from procedure area for routine progression of care. Report consisted of patients Situation, Background, Assessment and Recommendations(SBAR). Information from the following report(s) SBAR, Procedure Summary, MAR, Recent Results and Cardiac Rhythm NSR was reviewed with the receiving clinician. Opportunity for questions and clarification was provided. Assessment completed upon patients arrival to [address] [place] [address] and care assumed. Cardiac Cath Lab Recovery Arrival Note:    Nancy Epps arrived to [facility] recovery area. Patient procedure= DESTINEE/DCCV. Patient on cardiac monitor, non-invasive blood pressure, SPO2 monitor. On O2 @ 2 lpm via NC.  IV  of NS on pump at 25 ml/hr. Patient status doing well without problems. Patient is A&Ox 4. Patient reports No Pain. PROCEDURE SITE CHECK:    Procedure site:without any bleeding , No pain/discomfort reported at procedure site. No change in patient status. Continue to monitor patient and status.

## 2019-12-16 NOTE — INTERVAL H&P NOTE
H&P Update:  Kaitlin Aldridge was seen and examined. History and physical has been reviewed. The patient has been examined.  There have been no significant clinical changes since the completion of the originally dated History and Physical.

## 2019-12-16 NOTE — PROGRESS NOTES
[id number] -   Cardiac Cath Lab Recovery Arrival Note:      Yisel Duong arrived to Cardiac Cath Lab, Recovery Area. Staff introduced to patient. Patient identifiers verified with NAME and DATE OF BIRTH. Procedure verified with patient. Consent forms reviewed and signed by patient or authorized representative and verified. Allergies verified. Patient and family oriented to department. Patient and family informed of procedure and plan of care. Questions answered with review. Patient prepped for procedure, per orders from physician, prior to arrival.    Patient on cardiac monitor, non-invasive blood pressure, SPO2 monitor. On room air. Patient is A&Ox 4. Patient reports no complaints. Patient in stretcher, in low position, with side rails up, call bell within reach, patient instructed to call if assistance as needed. Patient prep in: [address], [place] [address]. Family in: waiting room.    Prep by: Keyur Wasserman, JEFF & Rosa G[place] RN

## 2019-12-16 NOTE — PROGRESS NOTES
TRANSFER - OUT REPORT:    Verbal report given to Patricia Quigley RN on Marquis Ramírez being transferred to cath lab recovery for routine progression of care       Report consisted of patients Situation, Background, Assessment and   Recommendations(SBAR). Information from the following report(s) Procedure Summary was reviewed with the receiving nurse. Opportunity for questions and clarification was provided. Cardiac Cath Lab Procedure Area Arrival Note:    Marquis Ramírez arrived to Cardiac Cath Lab, Procedure Area. Patient identifiers verified with NAME and DATE OF BIRTH. Procedure verified with patient. Consent forms verified. Allergies verified. Patient informed of procedure and plan of care. Questions answered with review. Patient voiced understanding of procedure and plan of care. Patient on cardiac monitor, non-invasive blood pressure, SPO2 monitor. On  or O2 @ 2 lpm via nasal canula. IV of 0.9% NS on pump at 25 ml/hr. Patient status doing well without problems. Patient is A&Ox 4. Patient reports no pain. Patient medicated during procedure with orders obtained and verified by Dr. Jona El. Refer to patients Cardiac Cath Lab PROCEDURE REPORT for vital signs, assessment, status, and response during procedure, printed at end of case. Printed report on chart or scanned into chart.

## 2019-12-16 NOTE — PROGRESS NOTES
Pt and Pt's spouse verbalized understanding of discharge instructions. PIV removed and guaze with tape placed; no bleeding or redness. Pt escorted to car via wheelchair with belongings.

## 2019-12-16 NOTE — Clinical Note
Patient transported with a Registered Nurse. Patient transported to: Holding area. Report to be given in the holding area at bedside.

## 2019-12-16 NOTE — PROCEDURES
Cardiac Procedure Note   Patient: Alexsander Bahena  MRN: [id number]  SSN: [id number]   YOB: 1965 Age: 49 y.o.   Sex: male    Date of Procedure: 12/16/2019   Pre-procedure Diagnosis: atrial fibrillation atrial flutter RVR, tachycardia induced cardiomyopathy, morbid obesity  Post-procedure Diagnosis: same  Procedure: DESTINEE and Cardioversion  :  Dr. Theodore Alvarado MD    Assistant(s):  None  Anesthesia: Moderate Sedation   Estimated Blood Loss: none  Specimens Removed: None  Findings:   DESTINEE: SILVIANO, LA>RA, LVEF 20-25%, mild MR, TR no TONY thrombus  Cardioversion 360 J AFIB to NSR  Complications: None   Implants:  None  Signed by:  Theodore Alvarado MD  12/16/2019  8:22 AM

## 2019-12-17 ENCOUNTER — TELEPHONE (OUTPATIENT)
Dept: CARDIOLOGY CLINIC | Age: 54
End: 2019-12-17

## 2019-12-17 NOTE — TELEPHONE ENCOUNTER
Patient states he's having vision issues. Giovani Baldwin I spoke with [address] [facility] but Per [address] [facility] to send her a message.  Please advise    [phone]

## 2019-12-17 NOTE — TELEPHONE ENCOUNTER
Verified patient with two types of identifiers. Patient states that he noticed this am that he had almost a \"blind spot\" in his left eye \"around 11:00\" in his vision field. Patient states he feels fine and denies any other symptoms. Patient states he feels like this is improving. Notified patient that Amiodarone takes a while to build up in his system so probably not medication. Patient is s/p [facility] 12/16/19. Will discuss with MD and return call with recommendations. Verified patient with two types of identifiers. Notified patient per Dr. Scot Colon patient to continue Amiodarone and Eliquis. Patient to call with any changes. Patient verbalized understanding and will call with any other questions.

## 2020-01-09 ENCOUNTER — OFFICE VISIT (OUTPATIENT)
Dept: CARDIOLOGY CLINIC | Age: 55
End: 2020-01-09

## 2020-01-09 VITALS
RESPIRATION RATE: 14 BRPM | DIASTOLIC BLOOD PRESSURE: 72 MMHG | HEIGHT: 69 IN | BODY MASS INDEX: 46.65 KG/M2 | SYSTOLIC BLOOD PRESSURE: 118 MMHG | WEIGHT: 315 LBS | HEART RATE: 49 BPM

## 2020-01-09 DIAGNOSIS — I43 TACHYCARDIA INDUCED CARDIOMYOPATHY (HCC): ICD-10-CM

## 2020-01-09 DIAGNOSIS — I48.3 TYPICAL ATRIAL FLUTTER (HCC): ICD-10-CM

## 2020-01-09 DIAGNOSIS — E11.9 TYPE 2 DIABETES MELLITUS WITHOUT COMPLICATION, WITHOUT LONG-TERM CURRENT USE OF INSULIN (HCC): ICD-10-CM

## 2020-01-09 DIAGNOSIS — E66.01 MORBID OBESITY, UNSPECIFIED OBESITY TYPE (HCC): ICD-10-CM

## 2020-01-09 DIAGNOSIS — R00.1 SINUS BRADYCARDIA BY ELECTROCARDIOGRAM: ICD-10-CM

## 2020-01-09 DIAGNOSIS — Z79.899 LONG TERM CURRENT USE OF AMIODARONE: ICD-10-CM

## 2020-01-09 DIAGNOSIS — R00.0 TACHYCARDIA INDUCED CARDIOMYOPATHY (HCC): ICD-10-CM

## 2020-01-09 DIAGNOSIS — G47.33 OSA (OBSTRUCTIVE SLEEP APNEA): ICD-10-CM

## 2020-01-09 DIAGNOSIS — I48.19 ATRIAL FIBRILLATION, PERSISTENT (HCC): Primary | ICD-10-CM

## 2020-01-09 DIAGNOSIS — Z98.890 HISTORY OF CARDIOVERSION: ICD-10-CM

## 2020-01-09 NOTE — PROGRESS NOTES
Cardiac Electrophysiology Office Note     Subjective: Yury Woodward is a 49 y.o. male patient who is seen today for follow up of atrial fibrillation. He had cardioversion to NSR 3 weeks ago (12/2019) and has maintained NSR sinus bradycardia with amiodarone loading  He is also on toprol for tachycardia induced cardiomyopathy  LVEF 25%, mild MR TR SILVIANO  Failed Tikosyn & Multaq     He has felt better with less RIOJAS and leg edema after cardioversion    Previous:  S/p synchronized cardioversion for AF 11/30/2018 & 07/03/2017, 12/2019    DESTINEE (11/30/2018): LVEF 45-50%, mild diffuse hypokinesis. Mod SILVIANO. Mild MR. Mild TR. DESTINEE 12/2019 LVEF 20-25%, SILVIANO, mild MR TR  Echo (08/24/2017): LVEF 50-55%, akinesis of apical wall(s), mild concentric LVH. LA mildly dilated. Hospitalization 07/2017 for atrial fibrillation with RVR and acute systolic CHF LVEF 39%. Had cardioversion after DESTINEE did not show thrombus. He was tachycardic on cardizem IV.     Problem List  Date Reviewed: 1/9/2020          Codes Class Noted    Encounter for cardioversion procedure ICD-10-CM: Z01.89  ICD-9-CM: V72.85  11/30/2018    Overview Addendum 12/16/2019  8:22 AM by Sussy Sands MD     12/16/2019             Obesity, morbid ([facility] ICD-10-CM: E66.01  ICD-9-CM: 278.01  8/30/2018        Anasarca ICD-10-CM: R60.1  ICD-9-CM: 782.3  7/1/2017        A-fib ([facility] ICD-10-CM: I48.91  ICD-9-CM: 427.31  7/1/2017        Acute chest pain ICD-10-CM: R07.9  ICD-9-CM: 786.50  7/1/2017        Pleural effusion, bilateral ICD-10-CM: J90  ICD-9-CM: 511.9  7/1/2017        Atrial flutter ([facility] ICD-10-CM: I48.92  ICD-9-CM: 427.32  1/29/2015        Persistent atrial fibrillation with RVR ICD-10-CM: I48.19  ICD-9-CM: 427.31  12/3/2014        Obesity ICD-10-CM: E66.9  ICD-9-CM: 278.00  12/3/2014        GEE (obstructive sleep apnea) ICD-10-CM: G47.33  ICD-9-CM: 327.23  12/3/2014            Past Medical History:   Diagnosis Date    Arrhythmia     Sleep apnea Past Surgical History: none   Procedure Laterality Date    [place] CARDIOVERSION ELECTIVE ARRHYTHMIA EXTERNAL  7/3/2017         [place] CARDIOVERSION ELECTIVE ARRHYTHMIA EXTERNAL  11/30/2018            Social History     Socioeconomic History    Marital status:      Spouse name: Not on file    Number of children: Not on file    Years of education: Not on file    Highest education level: Not on file   Occupational History    Not on file   Social Needs    Financial resource strain: Not on file    Food insecurity:     Worry: Not on file     Inability: Not on file    Transportation needs:     Medical: Not on file     Non-medical: Not on file   Tobacco Use    Smoking status: Never Smoker    Smokeless tobacco: Former User   Substance and Sexual Activity    Alcohol use: No     Alcohol/week: 0.0 standard drinks     Comment: Occassionally social    Drug use: No    Sexual activity: Not on file   Lifestyle    Physical activity:     Days per week: Not on file     Minutes per session: Not on file    Stress: Not on file   Relationships    Social connections:     Talks on phone: Not on file     Gets together: Not on file     Attends Zoroastrian service: Not on file     Active member of club or organization: Not on file     Attends meetings of clubs or organizations: Not on file     Relationship status: Not on file    Intimate partner violence:     Fear of current or ex partner: Not on file     Emotionally abused: Not on file     Physically abused: Not on file     Forced sexual activity: Not on file   Other Topics Concern    Not on file   Social History Narrative    Not on file     Family History   Problem Relation Age of Onset    Heart Disease Father     Stroke Mother          Current Outpatient Medications   Medication Sig Dispense Refill    apixaban (ELIQUIS) 5 mg tablet Take 1 Tab by mouth two (2) times a day.  Indications: Treatment to Prevent Blood Clots in Chronic Atrial Fibrillation 60 Tab 3    amiodarone (CORDARONE) 200 mg tablet Take 2 tabs by mouth twice daily x 2 wks, then 2 tabs daily x 2 wks, then 1 tab daily. 120 Tab 1    ferrous sulfate (SLOW FE) 142 mg (45 mg iron) ER tablet Take  by mouth Daily (before breakfast).  metoprolol succinate (TOPROL-XL) 50 mg XL tablet TAKE 1 TABLET BY MOUTH ONCE DAILY 90 Tab 1    furosemide (LASIX) 40 mg tablet TAKE 1 TABLET BY MOUTH  DAILY 90 Tab 1    atorvastatin (LIPITOR) 10 mg tablet Take 10 mg by mouth daily.  metFORMIN (GLUCOPHAGE) 500 mg tablet Take 500 mg by mouth. No Known Allergies  Past Medical History:   Diagnosis Date    Arrhythmia     Sleep apnea      Past Surgical History:   Procedure Laterality Date    [place] CARDIOVERSION ELECTIVE ARRHYTHMIA EXTERNAL  7/3/2017         [place] CARDIOVERSION ELECTIVE ARRHYTHMIA EXTERNAL  11/30/2018          Family History   Problem Relation Age of Onset    Heart Disease Father     Stroke Mother      Social History     Tobacco Use    Smoking status: Never Smoker    Smokeless tobacco: Former User   Substance Use Topics    Alcohol use: No     Alcohol/week: 0.0 standard drinks     Comment: Occassionally social        Review of Systems:   Constitutional: Negative for fever, chills, weight loss  HEENT: Negative for nosebleeds, vision changes. Respiratory: + cough, no hemoptysis, sputum production, and wheezing. Cardiovascular: Negative for chest pain, palpitations, orthopnea, claudication, + less leg swelling, no syncope, and no PND. Gastrointestinal: Negative for nausea, vomiting, diarrhea, constipation, blood in stool and melena. Genitourinary: Negative for dysuria, and hematuria. Musculoskeletal: Negative for myalgias, arthralgia. Skin: Negative for rash. Heme: Does not bleed or bruise easily.    Neurological: Negative for speech change and focal weakness     Objective:     Visit Vitals  /72 (BP 1 Location: Left arm, BP Patient Position: Sitting)   Pulse (!) 49   Resp 14   Ht 5' 9\" (1.753 m)   Wt 316 lb (143.3 kg)   BMI 46.67 kg/m²      Physical Exam:   Constitutional:  well-nourished. No distress. morbidly Obese. Head: Normocephalic and atraumatic. Eyes: Pupils are equal, round. Neck: Supple. No JVD present. Cardiovascular: slow rate, irregular rhythm. Exam reveals no gallop and no friction rub. No murmur heard. Pulmonary/Chest: Effort normal and breath sounds normal. No wheezes. Abdominal: Soft, morbidly obese. Musculoskeletal: 1+ edema. Neurological: Alert, oriented. Skin: Skin is warm and dry. Psychiatric: Normal mood and affect. Behavior is normal. Judgment and thought content normal.      ECG:  Sinus bradycardia long QTc    Assessment/Plan:       ICD-10-CM ICD-9-CM    1. Atrial fibrillation, persistent I48.19 427.31 AMB POC EKG ROUTINE W/ 12 LEADS, INTER & REP      METABOLIC PANEL, COMPREHENSIVE      TSH 3RD GENERATION      ECHO ADULT COMPLETE      XR CHEST PA LAT   2. Typical atrial flutter (HCC) I48.3 427.32    3. GEE (obstructive sleep apnea) G47.33 327.23    4. Type 2 diabetes mellitus without complication, without long-term current use of insulin (HCC) E11.9 250.00    5. History of cardioversion Z98.890 V15.1    6. Morbid obesity, unspecified obesity type ([facility] E66.01 278.01    7. Tachycardia induced cardiomyopathy (HCC) R00.0 785.0     I43 425.8    8. Long term current use of amiodarone Z79.899 V58.69    9. Sinus bradycardia by electrocardiogram R00.1 427.89      Mr. Marcano had hx of persistent AF/typical AFL with RVR increased fatigue, & dyspnea on mild exertion  Unable to increase Toprol XL due to BP. Eliquis for embolic CVA prophylaxis. LVEF was low due to tachycardia. His CHF symptoms have abated with sinus rhythm.     He is asymptomatic with sinus bradycardia  His weight is a significant contributor to his inability to maintain NSR   If he can maintain NSR I hope his LVEF will get better in 3 months recheck echo exam  He will taper amiodarone down to 200 mg every day and this would help with sinus bradycardia and prolonged QT  Risks/benefits of ablation discussed. He is aware that he would likely require both endocardial & epicardial ablation of atrial fibrillation if he opts to aim for long term restoration of sinus rhythm. Also discussed AV node ablation for long term rate control, would require pacemaker in that case. Given his age, only recommend AV node ablation & pacemaker if he is unwilling to undergo AF ablations. he will need more than one ablation     He will consider other above options if cannot maintain NSR  Get labs chest xray for amiodarone toxicity follow up  He wears glassed and has eye exam once a year      Future Appointments   Date Time Provider Shea Sneed   7/7/2020  2:00 PM ECHO, [address]   7/7/2020  3:00 PM Narayan Mcmillan  [address]     Thank you for involving me in this patient's care and please call with further concerns or questions. Wolfgang Merlin, M.D.   Electrophysiology/Cardiology  [facility] [place]  [address], [address], [address]  [address]                             [address] [place] [address]  [phone]

## 2020-01-09 NOTE — PATIENT INSTRUCTIONS
You will need to follow up in clinic with Dr. Mindy Duron in 6 months with an Echocardiogram and lab work.

## 2020-03-04 RX ORDER — FUROSEMIDE 40 MG/1
TABLET ORAL
Qty: 90 TAB | Refills: 0 | Status: SHIPPED | OUTPATIENT
Start: 2020-03-04 | End: 2020-06-09

## 2020-04-20 RX ORDER — APIXABAN 5 MG/1
TABLET, FILM COATED ORAL
Qty: 60 TAB | Refills: 0 | Status: SHIPPED | OUTPATIENT
Start: 2020-04-20 | End: 2020-05-21

## 2020-05-06 RX ORDER — METOPROLOL SUCCINATE 50 MG/1
TABLET, EXTENDED RELEASE ORAL
Qty: 90 TAB | Refills: 1 | Status: SHIPPED | OUTPATIENT
Start: 2020-05-06 | End: 2020-07-28 | Stop reason: SDUPTHER

## 2020-05-06 NOTE — TELEPHONE ENCOUNTER
Request for Toprol XL 50 mg daily. Last office visit 1/9/20, next office visit 7/7/20. Refills per verbal order from Dr. Ivone Red.

## 2020-05-21 RX ORDER — APIXABAN 5 MG/1
TABLET, FILM COATED ORAL
Qty: 60 TAB | Refills: 0 | Status: SHIPPED | OUTPATIENT
Start: 2020-05-21 | End: 2020-06-19

## 2020-06-09 RX ORDER — FUROSEMIDE 40 MG/1
TABLET ORAL
Qty: 90 TAB | Refills: 0 | Status: SHIPPED | OUTPATIENT
Start: 2020-06-09 | End: 2020-09-08

## 2020-06-19 DIAGNOSIS — Z79.899 LONG TERM CURRENT USE OF AMIODARONE: Primary | ICD-10-CM

## 2020-06-19 RX ORDER — AMIODARONE HYDROCHLORIDE 200 MG/1
200 TABLET ORAL DAILY
Qty: 120 TAB | Refills: 0 | Status: SHIPPED | OUTPATIENT
Start: 2020-06-19 | End: 2020-07-28

## 2020-06-19 RX ORDER — APIXABAN 5 MG/1
TABLET, FILM COATED ORAL
Qty: 60 TAB | Refills: 0 | Status: SHIPPED | OUTPATIENT
Start: 2020-06-19 | End: 2020-07-28 | Stop reason: SDUPTHER

## 2020-06-19 NOTE — LETTER
6/19/2020 3:32 PM 
 
Mr. Malu Garcia [address] [address] We have recently refilled your Amiodarone prescription. Please see attached lab slips for updated lab work. Please call our office with any questions. Sincerely, Julieta Velarde NP

## 2020-06-19 NOTE — TELEPHONE ENCOUNTER
Request for refills of Eliquis & amiodarone, refill sent electronically. Patient is overdue for LFTs & TSH; please send lab slip to patient's home so that he can have labs done.

## 2020-06-26 ENCOUNTER — HOSPITAL ENCOUNTER (OUTPATIENT)
Dept: GENERAL RADIOLOGY | Age: 55
Discharge: HOME | End: 2020-06-26
Attending: INTERNAL MEDICINE

## 2020-06-26 ENCOUNTER — TELEPHONE (OUTPATIENT)
Dept: CARDIOLOGY CLINIC | Age: 55
End: 2020-06-26

## 2020-06-26 DIAGNOSIS — I48.19 ATRIAL FIBRILLATION, PERSISTENT (HCC): ICD-10-CM

## 2020-06-26 NOTE — TELEPHONE ENCOUNTER
Verified patient with two types of identifiers. Notified patient that will need to have a chest x-ray one a year for long term Amiodarone use. Patient asked if he could go another place besides the [address] [place] [address]- yes he can go to another facility. Patient states its very expensive for him at the imaging center. Patient will try to have it done elsewhere. Reminded patient of upcoming appointment. Patient verbalized understanding and will call with any other questions.       Future Appointments   Date Time Provider Shea Sneed   7/7/2020  2:00 PM ECHO, [address]   7/7/2020  3:00 PM Cathy Prakash  [address]

## 2020-06-26 NOTE — TELEPHONE ENCOUNTER
Patient came in to the office and was wondering how often Dr. Bev Prince is expecting him to have a chest x-ray. Patient states that if it is often he will not do it due to the cost. Please call patient back at [phone].

## 2020-06-27 LAB
ALBUMIN SERPL-MCNC: 4.7 G/DL (ref 3.8–4.9)
ALP SERPL-CCNC: 129 IU/L (ref 39–117)
ALT SERPL-CCNC: 24 IU/L (ref 0–44)
AST SERPL-CCNC: 18 IU/L (ref 0–40)
BILIRUB DIRECT SERPL-MCNC: 0.16 MG/DL (ref 0–0.4)
BILIRUB SERPL-MCNC: 0.6 MG/DL (ref 0–1.2)
PROT SERPL-MCNC: 8.3 G/DL (ref 6–8.5)
TSH SERPL DL<=0.005 MIU/L-ACNC: 1.67 UIU/ML (ref 0.45–4.5)

## 2020-06-29 ENCOUNTER — TELEPHONE (OUTPATIENT)
Dept: CARDIOLOGY CLINIC | Age: 55
End: 2020-06-29

## 2020-06-29 ENCOUNTER — HOSPITAL ENCOUNTER (OUTPATIENT)
Dept: GENERAL RADIOLOGY | Age: 55
Discharge: HOME | End: 2020-06-29
Payer: COMMERCIAL

## 2020-06-29 PROCEDURE — 71046 X-RAY EXAM CHEST 2 VIEWS: CPT

## 2020-06-29 NOTE — TELEPHONE ENCOUNTER
----- Message from Chanda Lopes NP sent at 6/29/2020 11:02 AM EDT -----  CXR normal.  OK to continue amiodarone.

## 2020-06-29 NOTE — TELEPHONE ENCOUNTER
Per Jennifer Miranda & LFTs without significant acute abnormality\"    Verified patient with two types of identifiers. Notified patient of results. Reminded patient of upcoming appointment. Patient verbalized understanding and will call with any other questions.       Future Appointments   Date Time Provider Shea Sneed   7/7/2020  2:00 PM ECHO, [address] [facility]   7/7/2020  3:00 PM Tim Grossman  [address]

## 2020-07-28 ENCOUNTER — OFFICE VISIT (OUTPATIENT)
Dept: CARDIOLOGY CLINIC | Age: 55
End: 2020-07-28

## 2020-07-28 VITALS
BODY MASS INDEX: 46.65 KG/M2 | HEIGHT: 69 IN | DIASTOLIC BLOOD PRESSURE: 52 MMHG | RESPIRATION RATE: 16 BRPM | WEIGHT: 315 LBS | HEART RATE: 80 BPM | SYSTOLIC BLOOD PRESSURE: 122 MMHG | OXYGEN SATURATION: 96 %

## 2020-07-28 DIAGNOSIS — E66.01 MORBID OBESITY, UNSPECIFIED OBESITY TYPE (HCC): ICD-10-CM

## 2020-07-28 DIAGNOSIS — I50.22 CHF NYHA CLASS II, CHRONIC, SYSTOLIC (HCC): ICD-10-CM

## 2020-07-28 DIAGNOSIS — I48.19 PERSISTENT ATRIAL FIBRILLATION WITH RVR (HCC): Primary | ICD-10-CM

## 2020-07-28 DIAGNOSIS — Z98.890 HISTORY OF CARDIOVERSION: ICD-10-CM

## 2020-07-28 DIAGNOSIS — I48.19 ATRIAL FIBRILLATION, PERSISTENT (HCC): ICD-10-CM

## 2020-07-28 DIAGNOSIS — E11.9 TYPE 2 DIABETES MELLITUS WITHOUT COMPLICATION, WITHOUT LONG-TERM CURRENT USE OF INSULIN (HCC): ICD-10-CM

## 2020-07-28 DIAGNOSIS — G47.33 OSA (OBSTRUCTIVE SLEEP APNEA): ICD-10-CM

## 2020-07-28 RX ORDER — LOSARTAN POTASSIUM 25 MG/1
25 TABLET ORAL DAILY
Qty: 90 TAB | Refills: 3 | Status: SHIPPED | OUTPATIENT
Start: 2020-07-28 | End: 2021-08-26

## 2020-07-28 RX ORDER — METOPROLOL SUCCINATE 50 MG/1
TABLET, EXTENDED RELEASE ORAL
Qty: 90 TAB | Refills: 1 | Status: SHIPPED | OUTPATIENT
Start: 2020-07-28 | End: 2021-05-21

## 2020-07-28 NOTE — PROGRESS NOTES
Cardiac Electrophysiology Office Note     Subjective: Yisel Duong is a 49 y.o. male patient who is seen today for follow up of atrial fibrillation. He had cardioversion before and has been on amiodarone  During echo today rhythm strip appears AFIB  He denied dyspnea chest pain orthopnea leg edema  He is also on toprol for tachycardia induced cardiomyopathy  LVEF appears 40% today  Failed Tikosyn & Multaq and now amiodarone     Previous:  S/p synchronized cardioversion for AF 11/30/2018 & 07/03/2017, 12/2019    DESTINEE (11/30/2018): LVEF 45-50%, mild diffuse hypokinesis. Mod SILVIANO. Mild MR. Mild TR. DESTINEE 12/2019 LVEF 20-25%, SILVIANO, mild MR TR  Echo (08/24/2017): LVEF 50-55%, akinesis of apical wall(s), mild concentric LVH. LA mildly dilated. Hospitalization 07/2017 for atrial fibrillation with RVR and acute systolic CHF LVEF 17%. Had cardioversion after DESTINEE did not show thrombus. He was tachycardic on cardizem IV.     Problem List  Date Reviewed: 7/28/2020          Codes Class Noted    Encounter for cardioversion procedure ICD-10-CM: Z01.89  ICD-9-CM: V72.85  11/30/2018    Overview Addendum 12/16/2019  8:22 AM by Luz Vergara MD     12/16/2019             Obesity, morbid ([facility] ICD-10-CM: E66.01  ICD-9-CM: 278.01  8/30/2018        Anasarca ICD-10-CM: R60.1  ICD-9-CM: 782.3  7/1/2017        A-fib ([facility] ICD-10-CM: I48.91  ICD-9-CM: 427.31  7/1/2017        Acute chest pain ICD-10-CM: R07.9  ICD-9-CM: 786.50  7/1/2017        Pleural effusion, bilateral ICD-10-CM: J90  ICD-9-CM: 511.9  7/1/2017        Atrial flutter ([facility] ICD-10-CM: I48.92  ICD-9-CM: 427.32  1/29/2015        Persistent atrial fibrillation with RVR ICD-10-CM: I48.19  ICD-9-CM: 427.31  12/3/2014        Obesity ICD-10-CM: E66.9  ICD-9-CM: 278.00  12/3/2014        GEE (obstructive sleep apnea) ICD-10-CM: G47.33  ICD-9-CM: 327.23  12/3/2014            Past Medical History:   Diagnosis Date    Arrhythmia     Sleep apnea      Past Surgical History: none   Procedure Laterality Date    [place] CARDIOVERSION ELECTIVE ARRHYTHMIA EXTERNAL  7/3/2017         [place] CARDIOVERSION ELECTIVE ARRHYTHMIA EXTERNAL  11/30/2018            Social History     Socioeconomic History    Marital status:      Spouse name: Not on file    Number of children: Not on file    Years of education: Not on file    Highest education level: Not on file   Occupational History    Not on file   Social Needs    Financial resource strain: Not on file    Food insecurity     Worry: Not on file     Inability: Not on file   [employer] needs     Medical: Not on file     Non-medical: Not on file   Tobacco Use    Smoking status: Never Smoker    Smokeless tobacco: Former User   Substance and Sexual Activity    Alcohol use: No     Alcohol/week: 0.0 standard drinks     Comment: Occassionally social    Drug use: No    Sexual activity: Not on file   Lifestyle    Physical activity     Days per week: Not on file     Minutes per session: Not on file    Stress: Not on file   Relationships    Social connections     Talks on phone: Not on file     Gets together: Not on file     Attends Mosque service: Not on file     Active member of club or organization: Not on file     Attends meetings of clubs or organizations: Not on file     Relationship status: Not on file    Intimate partner violence     Fear of current or ex partner: Not on file     Emotionally abused: Not on file     Physically abused: Not on file     Forced sexual activity: Not on file   Other Topics Concern    Not on file   Social History Narrative    Not on file     Family History   Problem Relation Age of Onset    Heart Disease Father     Stroke Mother          Current Outpatient Medications   Medication Sig Dispense Refill    Eliquis 5 mg tablet Take 1 tablet by mouth twice daily 60 Tab 0    furosemide (LASIX) 40 mg tablet Take 1 tablet by mouth once daily 90 Tab 0    metoprolol succinate (TOPROL-XL) 50 mg XL tablet Take 1 tablet by mouth once daily 90 Tab 1    ferrous sulfate (SLOW FE) 142 mg (45 mg iron) ER tablet Take  by mouth Daily (before breakfast).  atorvastatin (LIPITOR) 10 mg tablet Take 10 mg by mouth daily.  metFORMIN (GLUCOPHAGE) 500 mg tablet Take 500 mg by mouth two (2) times daily (with meals). No Known Allergies  Past Medical History:   Diagnosis Date    Arrhythmia     Sleep apnea      Past Surgical History:   Procedure Laterality Date    [place] CARDIOVERSION ELECTIVE ARRHYTHMIA EXTERNAL  7/3/2017         [place] CARDIOVERSION ELECTIVE ARRHYTHMIA EXTERNAL  11/30/2018          Family History   Problem Relation Age of Onset    Heart Disease Father     Stroke Mother      Social History     Tobacco Use    Smoking status: Never Smoker    Smokeless tobacco: Former User   Substance Use Topics    Alcohol use: No     Alcohol/week: 0.0 standard drinks     Comment: Occassionally social        Review of Systems:   Constitutional: Negative for fever, chills, weight loss  HEENT: Negative for nosebleeds, vision changes. Respiratory: + cough, no hemoptysis, sputum production, and wheezing. Cardiovascular: Negative for chest pain, palpitations, orthopnea, claudication,  leg swelling, no syncope, and no PND. Gastrointestinal: Negative for nausea, vomiting, diarrhea, constipation, blood in stool and melena. Genitourinary: Negative for dysuria, and hematuria. Musculoskeletal: Negative for myalgias, arthralgia. Skin: Negative for rash. Heme: Does not bleed or bruise easily. Neurological: Negative for speech change and focal weakness     Objective:     Visit Vitals  /52 (BP 1 Location: Left arm, BP Patient Position: Sitting)   Pulse 80   Resp 16   Ht 5' 9\" (1.753 m)   Wt 338 lb (153.3 kg)   SpO2 96%   BMI 49.91 kg/m²      Physical Exam:   Constitutional:  well-nourished. No distress. morbidly Obese. Head: Normocephalic and atraumatic. Eyes: Pupils are equal, round. Neck: Supple.  No JVD present. Cardiovascular: normal rate, irregular rhythm. Exam reveals no gallop and no friction rub. No murmur heard. Pulmonary/Chest: Effort normal and breath sounds normal. No wheezes. Abdominal: Soft, morbidly obese. Musculoskeletal: no edema. Neurological: Alert, oriented. Skin: Skin is warm and dry. Psychiatric: Normal mood and affect. Behavior is normal. Judgment and thought content normal.      ECG: Atrial fibrillation   -  Nonspecific T-abnormality. Assessment/Plan:       ICD-10-CM ICD-9-CM    1. Persistent atrial fibrillation with RVR  I48.19 427.31 AMB POC EKG ROUTINE W/ 12 LEADS, INTER & REP   2. Atrial fibrillation, persistent ([facility]  I48.19 427.31    3. GEE (obstructive sleep apnea)  G47.33 327.23    4. Type 2 diabetes mellitus without complication, without long-term current use of insulin ([facility])  E11.9 250.00    5. History of cardioversion  Z98.890 V15.1    6. Morbid obesity, unspecified obesity type ([facility]  E66.01 278.01    7. CHF NYHA class II, chronic, systolic ([facility])  S54.77 708.93      428.0      Mr. Marcano had hx of persistent AF/typical AFL with RVR increased fatigue, CHF  Unable to increase Toprol XL due to BP. Eliquis for embolic CVA prophylaxis. LVEF is low due to AFIB  EF 40% so add low dose losartan  Will need BMP check with PCP  We talked about ablation but he cannot afford he said. He wants to get the estimated cost so I referred him to   Stop amiodarone  Return in 6 months  Thank you for involving me in this patient's care and please call with further concerns or questions. Geetha Santiago M.D.   Electrophysiology/Cardiology  [facility] [place]  [address], [address] , [address] [place] [address]  [phone]

## 2020-07-28 NOTE — PROGRESS NOTES
Identified pt with two pt identifiers(name and ). Reviewed record in preparation for visit and have obtained necessary documentation. Chief Complaint   Patient presents with    Irregular Heart Beat     6mo f/u      Vitals:    20 0851   Pulse: 80   Resp: 16   SpO2: 96%   Weight: 338 lb (153.3 kg)   Height: 5' 9\" (1.753 m)       Health Maintenance Review: Patient reminded of \"due or due soon\" health maintenance. I have asked the patient to contact his/her primary care provider (PCP) for follow-up on his/her health maintenance. Coordination of Care Questionnaire:  :   1) Have you been to an emergency room, urgent care, or hospitalized since your last visit? If yes, where when, and reason for visit? no       2. Have seen or consulted any other health care provider since your last visit? If yes, where when, and reason for visit? NO      Patient is accompanied by self I have received verbal consent from [facility] to discuss any/all medical information while they are present in the room.

## 2020-07-28 NOTE — PROGRESS NOTES
Cardiologist: Dr. Aleisha Winston    Last appt: 1/9/2020  Future Appointments   Date Time Provider Shea Sneed   1/28/2021 10:20 AM MD ALO San BS AMB       Requested Prescriptions     Signed Prescriptions Disp Refills    losartan (COZAAR) 25 mg tablet 90 Tab 3     Sig: Take 1 Tab by mouth daily.  metoprolol succinate (TOPROL-XL) 50 mg XL tablet 90 Tab 1     Sig: Take 1 tablet by mouth once daily    apixaban (Eliquis) 5 mg tablet 189 Tab 3     Sig: Take 1 Tab by mouth two (2) times a day. Refills VO per Dr. Aleisha Winston.     Vo per Dr. Aleisha Winston stop taking Amioderone

## 2020-09-08 RX ORDER — FUROSEMIDE 40 MG/1
TABLET ORAL
Qty: 90 TAB | Refills: 0 | Status: SHIPPED | OUTPATIENT
Start: 2020-09-08 | End: 2020-12-11

## 2021-02-04 ENCOUNTER — TELEPHONE (OUTPATIENT)
Dept: CARDIOLOGY CLINIC | Age: 56
End: 2021-02-04

## 2021-02-04 NOTE — TELEPHONE ENCOUNTER
Returned patient call. Verified patient by two identifiers. Rescheduled 2/10/2021 appointment with Dr. Aaron Barrientos out to 4/20/2021, as requested.     Future Appointments   Date Time Provider Shea Sneed   4/20/2021  3:20 PM MD ALO Nair AMB

## 2021-04-20 ENCOUNTER — OFFICE VISIT (OUTPATIENT)
Dept: CARDIOLOGY CLINIC | Age: 56
End: 2021-04-20
Payer: COMMERCIAL

## 2021-04-20 VITALS
DIASTOLIC BLOOD PRESSURE: 62 MMHG | SYSTOLIC BLOOD PRESSURE: 108 MMHG | OXYGEN SATURATION: 97 % | HEIGHT: 69 IN | WEIGHT: 315 LBS | BODY MASS INDEX: 46.65 KG/M2 | HEART RATE: 68 BPM

## 2021-04-20 DIAGNOSIS — G47.33 OSA (OBSTRUCTIVE SLEEP APNEA): ICD-10-CM

## 2021-04-20 DIAGNOSIS — I43 TACHYCARDIA INDUCED CARDIOMYOPATHY (HCC): ICD-10-CM

## 2021-04-20 DIAGNOSIS — R00.0 TACHYCARDIA INDUCED CARDIOMYOPATHY (HCC): ICD-10-CM

## 2021-04-20 DIAGNOSIS — Z98.890 HISTORY OF CARDIOVERSION: ICD-10-CM

## 2021-04-20 DIAGNOSIS — E66.01 MORBID OBESITY WITH BMI OF 45.0-49.9, ADULT (HCC): ICD-10-CM

## 2021-04-20 DIAGNOSIS — R60.0 BILATERAL LOWER EXTREMITY EDEMA: ICD-10-CM

## 2021-04-20 DIAGNOSIS — I48.11 LONGSTANDING PERSISTENT ATRIAL FIBRILLATION (HCC): Primary | ICD-10-CM

## 2021-04-20 DIAGNOSIS — R22.41 LOCALIZED SWELLING OF RIGHT FOOT: ICD-10-CM

## 2021-04-20 PROCEDURE — 99214 OFFICE O/P EST MOD 30 MIN: CPT | Performed by: INTERNAL MEDICINE

## 2021-04-20 RX ORDER — PREDNISONE 20 MG/1
40 TABLET ORAL
Qty: 14 TAB | Refills: 0 | Status: SHIPPED | OUTPATIENT
Start: 2021-04-20 | End: 2021-07-13

## 2021-04-20 RX ORDER — INDOMETHACIN 50 MG/1
50 CAPSULE ORAL 3 TIMES DAILY
Qty: 21 CAP | Refills: 0 | Status: SHIPPED | OUTPATIENT
Start: 2021-04-20 | End: 2021-07-19

## 2021-04-20 RX ORDER — BISMUTH SUBSALICYLATE 262 MG
1 TABLET,CHEWABLE ORAL DAILY
COMMUNITY
End: 2022-05-05

## 2021-04-20 RX ORDER — COLCHICINE 0.6 MG/1
0.6 CAPSULE ORAL DAILY
Qty: 30 CAP | Refills: 0 | Status: SHIPPED | OUTPATIENT
Start: 2021-04-20 | End: 2021-07-13

## 2021-04-20 NOTE — PROGRESS NOTES
Cardiologist: Dr. Charo Gates    Last appt: 4/20/2021  Future Appointments   Date Time Provider Shea Sneed   7/13/2021  2:00 PM MD ALO Egan BS AMB       Requested Prescriptions     Signed Prescriptions Disp Refills    colchicine (MITIGARE) 0.6 mg capsule 30 Cap 0     Sig: Take 1 Cap by mouth daily.  predniSONE (DELTASONE) 20 mg tablet 14 Tab 0     Sig: Take 40 mg by mouth daily (with breakfast).  indomethacin (INDOCIN) 50 mg capsule 21 Cap 0     Sig: Take 1 Cap by mouth three (3) times daily for 90 days. Refills VO per Dr. Charo Gates.

## 2021-04-20 NOTE — PATIENT INSTRUCTIONS
You will need to follow up in clinic with Dr. Nathaniel Medina in 3 months.       Start taking colchicine 0.6 mg once a day for 30 day    Prednisone 40 mg for 7 day    Indocine 50 mg three times a day for 7 days

## 2021-04-22 NOTE — PROGRESS NOTES
Cardiac Electrophysiology Office Note     Subjective: Arthur Beavers is a 48 y.o. male patient who is seen today for follow up of atrial fibrillation. He had cardioversion before and had been on amiodarone  He had a history of tachycardia induced cardiomyopathy  Failed Tikosyn & Multaq and then amiodarone  He did not want catheter ablation  His wife is here with him today  The patient have no worsening shortness of breath on exertion but it has been chronic  Both legs are chronically swollen  Right foot is swollen and painful in the last several days  Previous:  S/p synchronized cardioversion for AF 11/30/2018 & 07/03/2017, 12/2019    DESTINEE (11/30/2018): LVEF 45-50%, mild diffuse hypokinesis. Mod SILVIANO. Mild MR. Mild TR. DESTINEE 12/2019 LVEF 20-25%, SILVIANO, mild MR TR  Echo (08/24/2017): LVEF 50-55%, akinesis of apical wall(s), mild concentric LVH. LA mildly dilated. Hospitalization 07/2017 for atrial fibrillation with RVR and acute systolic CHF LVEF 30%. Had cardioversion after DESTINEE did not show thrombus. He was tachycardic on cardizem IV.     Problem List  Date Reviewed: 4/21/2021          Codes Class Noted    Encounter for cardioversion procedure ICD-10-CM: Z01.89  ICD-9-CM: V72.85  11/30/2018    Overview Addendum 12/16/2019  8:22 AM by Skyler Ashford MD     12/16/2019             Obesity, morbid ([facility] ICD-10-CM: E66.01  ICD-9-CM: 278.01  8/30/2018        Anasarca ICD-10-CM: R60.1  ICD-9-CM: 782.3  7/1/2017        A-fib ([facility] ICD-10-CM: I48.91  ICD-9-CM: 427.31  7/1/2017        Acute chest pain ICD-10-CM: R07.9  ICD-9-CM: 786.50  7/1/2017        Pleural effusion, bilateral ICD-10-CM: J90  ICD-9-CM: 511.9  7/1/2017        Atrial flutter ([facility] ICD-10-CM: I48.92  ICD-9-CM: 427.32  1/29/2015        Persistent atrial fibrillation with RVR ICD-10-CM: I48.19  ICD-9-CM: 427.31  12/3/2014        Obesity ICD-10-CM: E66.9  ICD-9-CM: 278.00  12/3/2014        GEE (obstructive sleep apnea) ICD-10-CM: T94.12  ICD-9-CM: 327.23  12/3/2014            Past Medical History:   Diagnosis Date    Arrhythmia     Sleep apnea      Past Surgical History: none   Procedure Laterality Date    [place] CARDIOVERSION ELECTIVE ARRHYTHMIA EXTERNAL  7/3/2017         [place] CARDIOVERSION ELECTIVE ARRHYTHMIA EXTERNAL  11/30/2018            Social History     Socioeconomic History    Marital status:      Spouse name: Not on file    Number of children: Not on file    Years of education: Not on file    Highest education level: Not on file   Occupational History    Not on file   Social Needs    Financial resource strain: Not on file    Food insecurity     Worry: Not on file     Inability: Not on file   [employer] needs     Medical: Not on file     Non-medical: Not on file   Tobacco Use    Smoking status: Never Smoker    Smokeless tobacco: Former User   Substance and Sexual Activity    Alcohol use: No     Alcohol/week: 0.0 standard drinks     Comment: Occassionally social    Drug use: No    Sexual activity: Not on file   Lifestyle    Physical activity     Days per week: Not on file     Minutes per session: Not on file    Stress: Not on file   Relationships    Social connections     Talks on phone: Not on file     Gets together: Not on file     Attends Ep[employer] service: Not on file     Active member of club or organization: Not on file     Attends meetings of clubs or organizations: Not on file     Relationship status: Not on file    Intimate partner violence     Fear of current or ex partner: Not on file     Emotionally abused: Not on file     Physically abused: Not on file     Forced sexual activity: Not on file   Other Topics Concern    Not on file   Social History Narrative    Not on file     Family History   Problem Relation Age of Onset    Heart Disease Father     Stroke Mother          Current Outpatient Medications   Medication Sig Dispense Refill    multivitamin (ONE A DAY) tablet Take 1 Tab by mouth daily.       colchicine (MITIGARE) 0.6 mg capsule Take 1 Cap by mouth daily. 30 Cap 0    predniSONE (DELTASONE) 20 mg tablet Take 40 mg by mouth daily (with breakfast). 14 Tab 0    indomethacin (INDOCIN) 50 mg capsule Take 1 Cap by mouth three (3) times daily for 90 days. 21 Cap 0    furosemide (LASIX) 40 mg tablet Take 1 tablet by mouth once daily 90 Tab 1    losartan (COZAAR) 25 mg tablet Take 1 Tab by mouth daily. 90 Tab 3    metoprolol succinate (TOPROL-XL) 50 mg XL tablet Take 1 tablet by mouth once daily 90 Tab 1    apixaban (Eliquis) 5 mg tablet Take 1 Tab by mouth two (2) times a day. 189 Tab 3    ferrous sulfate (SLOW FE) 142 mg (45 mg iron) ER tablet Take  by mouth Daily (before breakfast).  atorvastatin (LIPITOR) 10 mg tablet Take 10 mg by mouth daily.  metFORMIN (GLUCOPHAGE) 500 mg tablet Take 500 mg by mouth two (2) times daily (with meals). No Known Allergies  Past Medical History:   Diagnosis Date    Arrhythmia     Sleep apnea      Past Surgical History:   Procedure Laterality Date    [place] CARDIOVERSION ELECTIVE ARRHYTHMIA EXTERNAL  7/3/2017         [place] CARDIOVERSION ELECTIVE ARRHYTHMIA EXTERNAL  11/30/2018          Family History   Problem Relation Age of Onset    Heart Disease Father     Stroke Mother      Social History     Tobacco Use    Smoking status: Never Smoker    Smokeless tobacco: Former User   Substance Use Topics    Alcohol use: No     Alcohol/week: 0.0 standard drinks     Comment: Occassionally social        Review of Systems:   Constitutional: Negative for fever, chills, weight loss  HEENT: Negative for nosebleeds, vision changes. Respiratory: + cough, no hemoptysis, sputum production, and wheezing. Cardiovascular: Negative for chest pain, palpitations, orthopnea, claudication, he has leg swelling, no syncope, and no PND. He has shortness of breath exertion  Gastrointestinal: Negative for nausea, vomiting, diarrhea, constipation, blood in stool and melena. Genitourinary: Negative for dysuria, and hematuria. Musculoskeletal: Negative for myalgias, arthralgia. Skin: Negative for rash. Heme: Does not bleed or bruise easily. Neurological: Negative for speech change and focal weakness     Objective:     Visit Vitals  /62   Pulse 68   Ht 5' 9\" (1.753 m)   Wt 341 lb (154.7 kg)   SpO2 97%   BMI 50.36 kg/m²      Physical Exam:   Constitutional:  well-nourished. No distress. morbidly Obese. Head: Normocephalic and atraumatic. Eyes: Pupils are equal, round. Neck: Supple. No JVD present. Cardiovascular: normal rate, irregular rhythm. Exam reveals no gallop and no friction rub. No murmur heard. Pulmonary/Chest: Effort normal and breath sounds normal. No wheezes. Abdominal: Soft, morbidly obese. Musculoskeletal: Pretibial bilateral edema. Right foot swollen and tender  Neurological: Alert, oriented. Skin: Skin is warm and dry. Psychiatric: Normal mood and affect. Behavior is normal. Judgment and thought content normal.        Assessment/Plan:       ICD-10-CM ICD-9-CM    1. Longstanding persistent atrial fibrillation (HCC)  M43.79 198.08 METABOLIC PANEL, BASIC      URIC ACID      METABOLIC PANEL, BASIC      URIC ACID   2. GEE (obstructive sleep apnea)  G47.33 327.23    3. Tachycardia induced cardiomyopathy (HCC)  R00.0 785.0     I43 425.8    4. Morbid obesity with BMI of 45.0-49.9, adult (HCC)  E66.01 278.01     Z68.42 V85.42    5. History of cardioversion  Z98.890 V15.1    6. Localized swelling of right foot  R22.41 729.81    7. Bilateral lower extremity edema  R60.0 782.3      Mr. Marcano had hx of persistent AF/typical AFL with fatigue, chronic CHF  Unable to increase more Toprol XL due to BP. Eliquis for embolic CVA prophylaxis. LVEF is low due to AFIB  We talked about endocardial and epicardial hybrid ablation but he cannot afford he said.   He is still paying for the previous electrical cardioversion he said  Amiodarone had been stopped and the patient is rate control with his atrial fibrillation  Legs are chronically swollen because of chronic systolic heart failure and tachycardia induced cardiomyopathy  The patient is taking diuretic and probably has gout involving his right foot  It is tender and he needs pain relief  For acute gout treatment, I check his uric acid and give him colchicine and indomethacin short-term and prednisone  Long-term he probably will need allopurinol  Future Appointments   Date Time Provider Shea Sneed   7/13/2021  2:00 PM MD ALO Alvarez BS AMB       Thank you for involving me in this patient's care and please call with further concerns or questions. Kali Alva M.D.   Electrophysiology/Cardiology  [facility] [place]  [facility] [address], [place] [address] [place] [address]  [phone]

## 2021-04-23 ENCOUNTER — TELEPHONE (OUTPATIENT)
Dept: CARDIOLOGY CLINIC | Age: 56
End: 2021-04-23

## 2021-04-23 LAB
BUN SERPL-MCNC: 23 MG/DL (ref 6–24)
BUN/CREAT SERPL: 19 (ref 9–20)
CALCIUM SERPL-MCNC: 9.5 MG/DL (ref 8.7–10.2)
CHLORIDE SERPL-SCNC: 101 MMOL/L (ref 96–106)
CO2 SERPL-SCNC: 21 MMOL/L (ref 20–29)
CREAT SERPL-MCNC: 1.19 MG/DL (ref 0.76–1.27)
GLUCOSE SERPL-MCNC: 132 MG/DL (ref 65–99)
POTASSIUM SERPL-SCNC: 4.3 MMOL/L (ref 3.5–5.2)
SODIUM SERPL-SCNC: 139 MMOL/L (ref 134–144)
URATE SERPL-MCNC: 8.1 MG/DL (ref 3.8–8.4)

## 2021-04-23 NOTE — PROGRESS NOTES
Patient came by the office and said medications made him feel better  Uric acid normal, therefore I will not start him on allopurinol

## 2021-04-23 NOTE — TELEPHONE ENCOUNTER
Called pt two patient identifiers confirmed. Notified pt about Lab work and Dr. Rosalina Thakkar recommendations. Pt verbalized understanding of information discussed w/ no further questions at this time.

## 2021-04-23 NOTE — TELEPHONE ENCOUNTER
----- Message from Tierra Patterson MD sent at 4/23/2021 12:00 PM EDT -----  Patient came by the office and said medications made him feel better  Uric acid normal, therefore I will not start him on allopurinol

## 2021-05-21 RX ORDER — METOPROLOL SUCCINATE 50 MG/1
TABLET, EXTENDED RELEASE ORAL
Qty: 90 TABLET | Refills: 1 | Status: SHIPPED | OUTPATIENT
Start: 2021-05-21 | End: 2021-11-25

## 2021-05-21 NOTE — TELEPHONE ENCOUNTER
Request for Toprol XL 50 mg daily. Last office visit 4/20/21, next office visit 7/13/21. Refills per verbal order from Dr. Gideon Morejon.

## 2021-06-21 RX ORDER — FUROSEMIDE 40 MG/1
TABLET ORAL
Qty: 90 TABLET | Refills: 0 | Status: SHIPPED | OUTPATIENT
Start: 2021-06-21 | End: 2021-09-27

## 2021-06-21 NOTE — TELEPHONE ENCOUNTER
Received refill request for furosemide 40 mg po tabs. Last labs showed Cr, Na, & K WNL. Refills authorized.     Future Appointments   Date Time Provider [facility]   7/13/2021  2:00 PM MD ALO Alexander AMB

## 2021-07-13 ENCOUNTER — OFFICE VISIT (OUTPATIENT)
Dept: CARDIOLOGY CLINIC | Age: 56
End: 2021-07-13
Payer: COMMERCIAL

## 2021-07-13 VITALS
HEART RATE: 101 BPM | RESPIRATION RATE: 18 BRPM | DIASTOLIC BLOOD PRESSURE: 60 MMHG | HEIGHT: 69 IN | WEIGHT: 315 LBS | SYSTOLIC BLOOD PRESSURE: 100 MMHG | OXYGEN SATURATION: 99 % | BODY MASS INDEX: 46.65 KG/M2

## 2021-07-13 DIAGNOSIS — M10.20 DRUG-INDUCED GOUT, UNSPECIFIED CHRONICITY, UNSPECIFIED SITE: ICD-10-CM

## 2021-07-13 DIAGNOSIS — I42.8 NICM (NONISCHEMIC CARDIOMYOPATHY) (HCC): ICD-10-CM

## 2021-07-13 DIAGNOSIS — R60.0 BILATERAL LOWER EXTREMITY EDEMA: ICD-10-CM

## 2021-07-13 DIAGNOSIS — E66.01 MORBID OBESITY WITH BMI OF 45.0-49.9, ADULT (HCC): ICD-10-CM

## 2021-07-13 DIAGNOSIS — I48.11 LONGSTANDING PERSISTENT ATRIAL FIBRILLATION (HCC): Primary | ICD-10-CM

## 2021-07-13 DIAGNOSIS — I50.22 SYSTOLIC CHF, CHRONIC (HCC): ICD-10-CM

## 2021-07-13 PROCEDURE — 99214 OFFICE O/P EST MOD 30 MIN: CPT | Performed by: INTERNAL MEDICINE

## 2021-07-13 RX ORDER — ALLOPURINOL 100 MG/1
100 TABLET ORAL DAILY
Qty: 90 TABLET | Refills: 1 | Status: SHIPPED | OUTPATIENT
Start: 2021-07-13 | End: 2022-01-28 | Stop reason: SDUPTHER

## 2021-07-13 RX ORDER — DIGOXIN 250 MCG
0.25 TABLET ORAL DAILY
Qty: 90 TABLET | Refills: 1 | Status: SHIPPED | OUTPATIENT
Start: 2021-07-13 | End: 2022-01-28

## 2021-07-13 RX ORDER — CHOLECALCIFEROL (VITAMIN D3) 125 MCG
CAPSULE ORAL
COMMUNITY

## 2021-07-13 NOTE — PROGRESS NOTES
Cardiac Electrophysiology Office Note     Subjective:       Melissa Yañez is a 48 y.o. male patient who presents for follow up of persistent AF. He has historically declined ablation, is on Toprol XL for rate control.  Failed multiple AADs. Able to tolerate ADLs, but has to take breaks with activities such as working in the yard. [facility] does have good days & bad days; on the bad days, he notes decreased activity tolerance & fatigue.  He reports an episode of \"tingling\" in his left upper chest area. History of tachy induced cardiomyopathy, continues GDMT.  Last LVEF 40% in 07/2020.  NYHA II chronic systolic CHF. Intermittent gouty-type swelling in his feet, specifically great toe area with pain and relieved with meds.  Previously on colchicine, indomethacin, & prednisone and he was better in a day. BP low normal so cannot tolerate more medications. Anticoagulated with Eliquis, denies bleeding issues. Previous:   Failed Tikosyn & Multaq, then amiodarone. S/p DCCV for AF 11/30/2018 & 07/03/2017, 12/2019. Hospitalization 07/2017 for atrial fibrillation with RVR and acute systolic CHF LVEF 11%.  Had cardioversion after DESTINEE did not show thrombus.  Tachycardic on cardizem IV.      Problem List     Encounter for cardioversion procedure ICD-10-CM: Z01.89   ICD-9-CM: V72.85 11/30/2018   Overview Addendum 12/16/2019  8:22 AM by Charisma Salazar MD     12/16/2019       Obesity, morbid [facility]) ICD-10-CM: E66.01   ICD-9-CM: 278.01 8/30/2018     Anasarca ICD-10-CM: R60.1   ICD-9-CM: 782.3 7/1/2017     A-fib ([facility] ICD-10-CM: I48.91   ICD-9-CM: 427.31 7/1/2017     Acute chest pain ICD-10-CM: R07.9   ICD-9-CM: 786.50 7/1/2017     Pleural effusion, bilateral ICD-10-CM: J90   ICD-9-CM: 511.9 7/1/2017     Atrial flutter (HCC) ICD-10-CM: I48.92   ICD-9-CM: 427.32 1/29/2015     Persistent atrial fibrillation with RVR ICD-10-CM: I48.19   ICD-9-CM: 427.31 12/3/2014     Obesity ICD-10-CM: E66.9   ICD-9-CM: 278.00 12/3/2014     GEE (obstructive sleep apnea) ICD-10-CM: G47.33   ICD-9-CM: 327.23 12/3/2014         Past Medical History:   Diagnosis Date   · Arrhythmia   · Sleep apnea     Past Surgical History: none   Procedure Laterality Date   · [place] CARDIOVERSION ELECTIVE ARRHYTHMIA EXTERNAL 7/3/2017     · [place] CARDIOVERSION ELECTIVE ARRHYTHMIA EXTERNAL 11/30/2018         Social History     Socioeconomic History   · Marital status:    Spouse name: Not on file   · Number of children: Not on file   · Years of education: Not on file   · Highest education level: Not on file   Occupational History   · Not on file   Tobacco Use   · Smoking status: Never Smoker   · Smokeless tobacco: Former User   Substance and Sexual Activity   · Alcohol use: No   Alcohol/week: 0.0 standard drinks   Comment: Occassionally social   · Drug use: No   · Sexual activity: Not on file   Other Topics Concern   · Not on file   Social History Narrative   · Not on file     Social Determinants of Health     Financial Resource Strain:   · Difficulty of Paying Living Expenses:   Food Insecurity:   · Worried About [address] in the Last Year:   · [address] in the Last Year:   Transportation Needs:   · Lack of Transportation (Medical):    · Lack of Transportation (Non-Medical):   Physical Activity:   · Days of Exercise per Week:   · Minutes of Exercise per Session:   Stress:   · Feeling of Stress :   Social Connections:   · Frequency of Communication with Friends and Family:   · Frequency of Social Gatherings with Friends and Family:   · Attends [employer] Services:   · Active Member of Clubs or Organizations:   · Attends Club or Organization Meetings:   · Marital Status:   Intimate Partner Violence:   · Fear of Current or Ex-Partner:   · Emotionally Abused:   · Physically Abused:   · Sexually Abused:     Family History   Problem Relation Age of Onset   · Heart Disease Father   · Stroke Mother         Current Outpatient Medications Medication Sig Dispense Refill   · cholecalciferol, vitamin D3, (Vitamin D3) 50 mcg (2,000 unit) tab Take  by mouth. · allopurinoL (ZYLOPRIM) 100 mg tablet Take 1 Tablet by mouth daily. 90 Tablet 1   · digoxin (LANOXIN) 0.25 mg tablet Take 1 Tablet by mouth daily. 90 Tablet 1   · furosemide (LASIX) 40 mg tablet Take 1 tablet by mouth once daily 90 Tablet 0   · metoprolol succinate (TOPROL-XL) 50 mg XL tablet Take 1 tablet by mouth once daily 90 Tablet 1   · multivitamin (ONE A DAY) tablet Take 1 Tab by mouth daily. · indomethacin (INDOCIN) 50 mg capsule Take 1 Cap by mouth three (3) times daily for 90 days. 21 Cap 0   · losartan (COZAAR) 25 mg tablet Take 1 Tab by mouth daily. 90 Tab 3   · apixaban (Eliquis) 5 mg tablet Take 1 Tab by mouth two (2) times a day. 189 Tab 3   · atorvastatin (LIPITOR) 10 mg tablet Take 10 mg by mouth daily. · metFORMIN (GLUCOPHAGE) 500 mg tablet Take 500 mg by mouth two (2) times daily (with meals). · colchicine (MITIGARE) 0.6 mg capsule Take 1 Cap by mouth daily. (Patient not taking: Reported on 7/13/2021) 30 Cap 0   · predniSONE (DELTASONE) 20 mg tablet Take 40 mg by mouth daily (with breakfast). (Patient not taking: Reported on 7/13/2021) 14 Tab 0   · ferrous sulfate (SLOW FE) 142 mg (45 mg iron) ER tablet Take  by mouth Daily (before breakfast).  (Patient not taking: Reported on 7/13/2021)     No Known Allergies   Past Medical History:   Diagnosis Date   · Arrhythmia   · Sleep apnea     Past Surgical History:   Procedure Laterality Date   · [place] CARDIOVERSION ELECTIVE ARRHYTHMIA EXTERNAL 7/3/2017     · [place] CARDIOVERSION ELECTIVE ARRHYTHMIA EXTERNAL 11/30/2018       Family History   Problem Relation Age of Onset   · Heart Disease Father   · Stroke Mother     Social History     Tobacco Use   · Smoking status: Never Smoker   · Smokeless tobacco: Former User   Substance Use Topics   · Alcohol use: No   Alcohol/week: 0.0 standard drinks   Comment: Occassionally social         Review of Systems:  All other review of systems otherwise negative. Constitutional: Negative for fever, chills, weight loss. HEENT: Negative for nosebleeds, vision changes. Respiratory: No cough, no hemoptysis, sputum production, and wheezing. Cardiovascular: Negative for chest pain, palpitations, orthopnea, claudication, he has leg swelling, no syncope, and no PND.  He has shortness of breath exertion   Gastrointestinal: Negative for nausea, vomiting, diarrhea, constipation, blood in stool and melena. Genitourinary: Negative for dysuria, and hematuria. Musculoskeletal: Negative for myalgias.  + intermittent edema, discomfort, & warmth in feet, specifically in great toes. Skin: Negative for rash. Heme: Does not bleed or bruise easily. Neurological: Negative for speech change and focal weakness.       Objective:   Visit Vitals  /60   Pulse (!) 101   Resp 18   Ht 5' 9\" (1.753 m)   Wt 334 lb (151.5 kg)   SpO2 99%   BMI 49.32 kg/m²         Physical Exam:   Constitutional:  Well-nourished. No distress.     Head: Normocephalic and atraumatic. Eyes: Pupils are equal, round. Neck: Supple. No JVD present. Cardiovascular: fast rate, irregular rhythm.  Exam reveals no gallop and no friction rub.  No murmur heard. Pulmonary/Chest: Effort normal and breath sounds normal. No wheezes. Abdominal: Soft, morbidly obese. Musculoskeletal: Moves extremities independently.  Normal gait. Vasc/lymphatic: Pretibial bilateral edema.     Neurological: Alert, oriented. Skin: Skin is warm and dry.  Venous stasis changes bilateral lower extremities. Psychiatric: Normal mood and affect. Behavior is normal. Judgment and thought content normal.         Assessment/Plan:     Imaging/Studies:   Echo (07/28/2020): LVEF 40%, mildly dilated LV, mod concentric LVH.  Mildly dilated LA. DESTINEE (12/16/2019): LVEF 20-25%.  Severely SILVIANO.  Mild MR.  Mild TR. ICD-10-CM ICD-9-CM    1.  Longstanding persistent atrial fibrillation ([facility]  I48.11 427.31 ECHO ADULT COMPLETE   2. Morbid obesity with BMI of 45.0-49.9, adult ([facility])  E66.01 278.01 ECHO ADULT COMPLETE    Z68.42 V85.42    3. Bilateral lower extremity edema  R60.0 782.3 ECHO ADULT COMPLETE   4. Drug-induced gout, unspecified chronicity, unspecified site  M10.20 274.9 ECHO ADULT COMPLETE     E980.5    5. NICM (nonischemic cardiomyopathy) ([facility])  I42.8 425.4 ECHO ADULT COMPLETE   6. Systolic CHF, chronic ([facility])  I50.22 428.22 ECHO ADULT COMPLETE     428.0          Persistent AF/typical AFL:   Symptomatic with fatigue, failed multiple AADs.  Rate usually controlled, unable to increase Toprol XL due to low BP. Will add digoxin 0.25 mg po daily for further rate control.  Declined endocardial & epicardial hybrid ablation previously.  With severe SILVIANO on prior DESTINEE, likely unable to maintain NSR post ablation now  Therefore I discussed possible AV node ablation & biventricular pacemaker, including risks/benefits. They will check echo in 6 months and decide  He is still paying for cardioversion cost    NICM: Last LVEF 40% in 07/2020, previously further reduced. NYHA II chronic systolic CHF.  Continue GDMT as previously prescribed.  Plan to repeat echo next year.  Would consider AV node ablation & biventricular pacemaker implant in the future. Gout: Intermittent, previously improved on colchicine, indomethacin, & prednisone as acute treatment.  Exacerbated by furosemide.    Will start allopurinol 100 mg po daily to prevent, can increase in the future if needed.    Weight is also a contributor to lower extremity edema and gout attack. Anticoagulation: Continue Eliquis for embolic CVA prophylaxis. No significant bleeding issues. Follow up in EP clinic in 6 months, will repeat echo at that time to determine BIV pacer and AV node ablation.         Future Appointments   Date Time Provider Shea Sneed   1/25/2022 11:00 AM ECHO, RASHI MYLES   1/25/2022 11:40 AM MD ALO Alejandro AMB          Thank you for involving me in this patient's care and please call with further concerns or questions. Hi Nunez M.D.    Electrophysiology/Cardiology   [facility] [place]   [place] [address]                     [address] [facility], [address] [place] [address]   [phone]                                        [phone]

## 2021-07-13 NOTE — PATIENT INSTRUCTIONS
Start Allopurinol 100 mg daily    Start Digoxin 0.25 mg daily.     You will need to follow up in clinic with Dr. Reginold Bumpers in 6 months with an Echocardiogram.

## 2021-11-10 ENCOUNTER — TELEPHONE (OUTPATIENT)
Dept: CARDIOLOGY CLINIC | Age: 56
End: 2021-11-10

## 2021-11-10 NOTE — TELEPHONE ENCOUNTER
Received a letter from [address] [place] [address] that as of 01/01/2022, Eliquis will not longer be covered under patient's plan. Preferred medications Xarelto and Warfarin. Verified patient with two types of identifiers. Patient states he received letter as well. Patient okay with switching to Xarelto if MD okay with change. Will touch base after the new year regarding change. Patient verbalized understanding and will call with any other questions.

## 2021-11-24 ENCOUNTER — TRANSCRIBE ORDER (OUTPATIENT)
Dept: SCHEDULING | Age: 56
End: 2021-11-24

## 2021-11-24 DIAGNOSIS — R79.89 ABNORMAL TSH: Primary | ICD-10-CM

## 2021-11-25 RX ORDER — METOPROLOL SUCCINATE 50 MG/1
TABLET, EXTENDED RELEASE ORAL
Qty: 90 TABLET | Refills: 0 | Status: SHIPPED | OUTPATIENT
Start: 2021-11-25 | End: 2022-01-25

## 2022-01-25 ENCOUNTER — OFFICE VISIT (OUTPATIENT)
Dept: CARDIOLOGY CLINIC | Age: 57
End: 2022-01-25

## 2022-01-25 ENCOUNTER — ANCILLARY PROCEDURE (OUTPATIENT)
Dept: CARDIOLOGY CLINIC | Age: 57
End: 2022-01-25
Payer: COMMERCIAL

## 2022-01-25 VITALS
RESPIRATION RATE: 16 BRPM | BODY MASS INDEX: 46.65 KG/M2 | OXYGEN SATURATION: 96 % | DIASTOLIC BLOOD PRESSURE: 70 MMHG | WEIGHT: 315 LBS | HEIGHT: 69 IN | SYSTOLIC BLOOD PRESSURE: 118 MMHG | HEART RATE: 135 BPM

## 2022-01-25 DIAGNOSIS — Z79.01 CHRONIC ANTICOAGULATION: ICD-10-CM

## 2022-01-25 DIAGNOSIS — Z01.812 PRE-PROCEDURE LAB EXAM: ICD-10-CM

## 2022-01-25 DIAGNOSIS — I48.11 LONGSTANDING PERSISTENT ATRIAL FIBRILLATION (HCC): ICD-10-CM

## 2022-01-25 DIAGNOSIS — E66.01 MORBID OBESITY WITH BMI OF 45.0-49.9, ADULT (HCC): ICD-10-CM

## 2022-01-25 DIAGNOSIS — R60.0 BILATERAL LOWER EXTREMITY EDEMA: ICD-10-CM

## 2022-01-25 DIAGNOSIS — I42.8 NICM (NONISCHEMIC CARDIOMYOPATHY) (HCC): Primary | ICD-10-CM

## 2022-01-25 DIAGNOSIS — M10.20 DRUG-INDUCED GOUT, UNSPECIFIED CHRONICITY, UNSPECIFIED SITE: ICD-10-CM

## 2022-01-25 DIAGNOSIS — I42.8 NICM (NONISCHEMIC CARDIOMYOPATHY) (HCC): ICD-10-CM

## 2022-01-25 DIAGNOSIS — I50.22 SYSTOLIC CHF, CHRONIC (HCC): ICD-10-CM

## 2022-01-25 DIAGNOSIS — I50.22 CHF NYHA CLASS II, CHRONIC, SYSTOLIC (HCC): ICD-10-CM

## 2022-01-25 LAB
ECHO AO ASC DIAM: 3.3 CM
ECHO AO ASCENDING AORTA INDEX: 1.31 CM/M2
ECHO AO ROOT DIAM: 3.1 CM
ECHO AO ROOT INDEX: 1.23 CM/M2
ECHO IVC PROX: 2.4 CM
ECHO LA DIAMETER INDEX: 1.9 CM/M2
ECHO LA DIAMETER: 4.8 CM
ECHO LA TO AORTIC ROOT RATIO: 1.55
ECHO LA VOL 2C: 105 ML (ref 18–58)
ECHO LA VOL 4C: 69 ML (ref 18–58)
ECHO LA VOL BP: 87 ML (ref 18–58)
ECHO LA VOL/BSA BIPLANE: 35 ML/M2 (ref 16–34)
ECHO LA VOLUME AREA LENGTH: 99 ML
ECHO LA VOLUME INDEX A2C: 42 ML/M2 (ref 16–34)
ECHO LA VOLUME INDEX A4C: 27 ML/M2 (ref 16–34)
ECHO LA VOLUME INDEX AREA LENGTH: 39 ML/M2 (ref 16–34)
ECHO LV E' LATERAL VELOCITY: 14 CM/S
ECHO LV E' SEPTAL VELOCITY: 10 CM/S
ECHO LV EDV A2C: 159 ML
ECHO LV EDV A4C: 183 ML
ECHO LV EDV BP: 173 ML (ref 67–155)
ECHO LV EDV INDEX A4C: 73 ML/M2
ECHO LV EDV INDEX BP: 69 ML/M2
ECHO LV EDV NDEX A2C: 63 ML/M2
ECHO LV EJECTION FRACTION A2C: 19 %
ECHO LV EJECTION FRACTION A4C: 34 %
ECHO LV EJECTION FRACTION BIPLANE: 26 % (ref 55–100)
ECHO LV ESV A2C: 129 ML
ECHO LV ESV A4C: 120 ML
ECHO LV ESV BP: 128 ML (ref 22–58)
ECHO LV ESV INDEX A2C: 51 ML/M2
ECHO LV ESV INDEX A4C: 48 ML/M2
ECHO LV ESV INDEX BP: 51 ML/M2
ECHO LV FRACTIONAL SHORTENING: 20 % (ref 28–44)
ECHO LV INTERNAL DIMENSION DIASTOLE INDEX: 2.34 CM/M2
ECHO LV INTERNAL DIMENSION DIASTOLIC: 5.9 CM (ref 4.2–5.9)
ECHO LV INTERNAL DIMENSION SYSTOLIC INDEX: 1.87 CM/M2
ECHO LV INTERNAL DIMENSION SYSTOLIC: 4.7 CM
ECHO LV IVSD: 1.1 CM (ref 0.6–1)
ECHO LV MASS 2D: 271.9 G (ref 88–224)
ECHO LV MASS INDEX 2D: 107.9 G/M2 (ref 49–115)
ECHO LV POSTERIOR WALL DIASTOLIC: 1.1 CM (ref 0.6–1)
ECHO LV RELATIVE WALL THICKNESS RATIO: 0.37
ECHO LVOT AREA: 4.5 CM2
ECHO LVOT DIAM: 2.4 CM
ECHO RV INTERNAL DIMENSION: 3.7 CM
ECHO RV TAPSE: 1.9 CM (ref 1.5–2)

## 2022-01-25 PROCEDURE — 93000 ELECTROCARDIOGRAM COMPLETE: CPT | Performed by: INTERNAL MEDICINE

## 2022-01-25 PROCEDURE — 99214 OFFICE O/P EST MOD 30 MIN: CPT | Performed by: INTERNAL MEDICINE

## 2022-01-25 PROCEDURE — 93306 TTE W/DOPPLER COMPLETE: CPT | Performed by: INTERNAL MEDICINE

## 2022-01-25 RX ORDER — LANOLIN ALCOHOL/MO/W.PET/CERES
CREAM (GRAM) TOPICAL
COMMUNITY

## 2022-01-25 RX ORDER — METOPROLOL SUCCINATE 50 MG/1
50 TABLET, EXTENDED RELEASE ORAL 2 TIMES DAILY
Qty: 180 TABLET | Refills: 1 | Status: SHIPPED | OUTPATIENT
Start: 2022-01-25 | End: 2022-07-29

## 2022-01-25 NOTE — PATIENT INSTRUCTIONS
Increase toprol XL to 50 mg po twice a day. Change eliquis to xarelto 20 mg daily. You do not need to wait anytime before starting new medication. Schedule limited echocardiogram and follow up with Dr. Merrill Comer in May/2022.

## 2022-01-25 NOTE — PROGRESS NOTES
Cardiac Electrophysiology Office Note     Subjective:       Hal Hoang is a 55 y.o. male patient who presents for follow up of persistent AF. Rate is fast 140 bpm today    He has historically declined AF ablation, is on Toprol XL & digoxin for rate control.  Failed multiple AADs and electrical cardioversions. ECG today shows AF with RVR (140 bpm). Echo today shows LVEF reduced to 20-25%.  NYHA II chronic systolic CHF.  On appropriate GDMT. BP low normal.     Anticoagulated with Eliquis, denies bleeding issues. Previous:   Intermittent gouty-type swelling in his feet, specifically great toe area with pain and relieved with meds.  Previously on colchicine, indomethacin, & prednisone and he was better in a day. Failed Tikosyn & Multaq, then amiodarone. S/p DCCV for AF 11/30/2018 & 07/03/2017, 12/2019. Hospitalization 07/2017 for atrial fibrillation with RVR and acute systolic CHF LVEF 05%.  Had cardioversion after DESTINEE did not show thrombus.  Tachycardic on cardizem IV.        Problem List     Encounter for cardioversion procedure ICD-10-CM: Z01.89   ICD-9-CM: V72.85 11/30/2018   Overview Addendum 12/16/2019  8:22 AM by Helen Ojeda MD     12/16/2019       Obesity, morbid [facility]) ICD-10-CM: E66.01   ICD-9-CM: 278.01 8/30/2018     Anasarca ICD-10-CM: R60.1   ICD-9-CM: 782.3 7/1/2017     A-fib ([facility] ICD-10-CM: I48.91   ICD-9-CM: 427.31 7/1/2017     Acute chest pain ICD-10-CM: R07.9   ICD-9-CM: 786.50 7/1/2017     Pleural effusion, bilateral ICD-10-CM: J90   ICD-9-CM: 511.9 7/1/2017     Atrial flutter ([facility] ICD-10-CM: I48.92   ICD-9-CM: 427.32 1/29/2015     Persistent atrial fibrillation with RVR ICD-10-CM: I48.19   ICD-9-CM: 427.31 12/3/2014     Obesity ICD-10-CM: E66.9   ICD-9-CM: 278.00 12/3/2014     GEE (obstructive sleep apnea) ICD-10-CM: G47.33   ICD-9-CM: 327.23 12/3/2014         Past Medical History:   Diagnosis Date   · Arrhythmia   · Sleep apnea     Past Surgical History: none   Procedure Laterality Date   · [place] CARDIOVERSION ELECTIVE ARRHYTHMIA EXTERNAL 7/3/2017     · [place] CARDIOVERSION ELECTIVE ARRHYTHMIA EXTERNAL 11/30/2018         Social History     Socioeconomic History   · Marital status:    Spouse name: Not on file   · Number of children: Not on file   · Years of education: Not on file   · Highest education level: Not on file   Occupational History   · Not on file   Tobacco Use   · Smoking status: Never Smoker   · Smokeless tobacco: Former User   Substance and Sexual Activity   · Alcohol use: No   Alcohol/week: 0.0 standard drinks   Comment: Occassionally social   · Drug use: No   · Sexual activity: Not on file   Other Topics Concern   · Not on file   Social History Narrative   · Not on file     Social Determinants of Health     Financial Resource Strain:   · Difficulty of Paying Living Expenses:   Food Insecurity:   · Worried About [address] in the Last Year:   · [address] in the Last Year:   Transportation Needs:   · Lack of Transportation (Medical): · Lack of Transportation (Non-Medical):   Physical Activity:   · Days of Exercise per Week:   · Minutes of Exercise per Session:   Stress:   · Feeling of Stress :   Social Connections:   · Frequency of Communication with Friends and Family:   · Frequency of Social Gatherings with Friends and Family:   · Attends [employer] Services:   · Active Member of Clubs or Organizations:   · Attends Club or Organization Meetings:   · Marital Status:   Intimate Partner Violence:   · Fear of Current or Ex-Partner:   · Emotionally Abused:   · Physically Abused:   · Sexually Abused:     Family History   Problem Relation Age of Onset   · Heart Disease Father   · Stroke Mother         Current Outpatient Medications   Medication Sig Dispense Refill   · cholecalciferol, vitamin D3, (Vitamin D3) 50 mcg (2,000 unit) tab Take  by mouth. · allopurinoL (ZYLOPRIM) 100 mg tablet Take 1 Tablet by mouth daily.  90 Tablet 1   · digoxin (LANOXIN) 0.25 mg tablet Take 1 Tablet by mouth daily. 90 Tablet 1   · furosemide (LASIX) 40 mg tablet Take 1 tablet by mouth once daily 90 Tablet 0   · metoprolol succinate (TOPROL-XL) 50 mg XL tablet Take 1 tablet by mouth once daily 90 Tablet 1   · multivitamin (ONE A DAY) tablet Take 1 Tab by mouth daily. · indomethacin (INDOCIN) 50 mg capsule Take 1 Cap by mouth three (3) times daily for 90 days. 21 Cap 0   · losartan (COZAAR) 25 mg tablet Take 1 Tab by mouth daily. 90 Tab 3   · apixaban (Eliquis) 5 mg tablet Take 1 Tab by mouth two (2) times a day. 189 Tab 3   · atorvastatin (LIPITOR) 10 mg tablet Take 10 mg by mouth daily. · metFORMIN (GLUCOPHAGE) 500 mg tablet Take 500 mg by mouth two (2) times daily (with meals). · colchicine (MITIGARE) 0.6 mg capsule Take 1 Cap by mouth daily. (Patient not taking: Reported on 7/13/2021) 30 Cap 0   · predniSONE (DELTASONE) 20 mg tablet Take 40 mg by mouth daily (with breakfast). (Patient not taking: Reported on 7/13/2021) 14 Tab 0   · ferrous sulfate (SLOW FE) 142 mg (45 mg iron) ER tablet Take  by mouth Daily (before breakfast). (Patient not taking: Reported on 7/13/2021)     No Known Allergies   Past Medical History:   Diagnosis Date   · Arrhythmia   · Sleep apnea     Past Surgical History:   Procedure Laterality Date   · [place] CARDIOVERSION ELECTIVE ARRHYTHMIA EXTERNAL 7/3/2017     · [place] CARDIOVERSION ELECTIVE ARRHYTHMIA EXTERNAL 11/30/2018       Family History   Problem Relation Age of Onset   · Heart Disease Father   · Stroke Mother     Social History     Tobacco Use   · Smoking status: Never Smoker   · Smokeless tobacco: Former User   Substance Use Topics   · Alcohol use: No   Alcohol/week: 0.0 standard drinks   Comment: Occassionally social         Review of Systems:  All other review of systems otherwise negative. Constitutional: Negative for fever, chills, weight loss. HEENT: Negative for nosebleeds, vision changes.    Respiratory: No cough, no hemoptysis, sputum production, and wheezing. Cardiovascular: Negative for chest pain, palpitations, orthopnea, claudication, occasional leg swelling, no syncope, and no PND.  He has shortness of breath exertion. Gastrointestinal: Negative for nausea, vomiting, diarrhea, constipation, blood in stool and melena. Genitourinary: Negative for dysuria, and hematuria. Musculoskeletal: Negative for myalgias.  + edema improved. Skin: Negative for rash. Heme: Does not bleed or bruise easily. Neurological: Negative for speech change and focal weakness.       Visit Vitals  /70   Pulse (!) 135   Resp 16   Ht 5' 9\" (1.753 m)   Wt 320 lb 6.4 oz (145.3 kg)   SpO2 96%   BMI 47.31 kg/m²           Physical Exam:   Constitutional:  Well-nourished. No distress.     Head: Normocephalic and atraumatic. Eyes: Pupils are equal, round. Neck: Supple. No JVD present. Cardiovascular: Fast rate, irregular rhythm.  Exam reveals no gallop and no friction rub.  No murmur heard. Pulmonary/Chest: Effort normal and breath sounds normal. No wheezes. Abdominal: Soft, morbidly obese. Musculoskeletal: Moves extremities independently.  Normal gait. Vasc/lymphatic: Pretibial bilateral edema.     Neurological: Alert, oriented. Skin: Skin is warm and dry.  Venous stasis changes bilateral lower extremities. Psychiatric: Normal mood and affect. Behavior is normal. Judgment and thought content normal.         Assessment/Plan:     Imaging/Studies:   Echo (07/28/2020): LVEF 40%, mildly dilated LV, mod concentric LVH.  Mildly dilated LA. DESTINEE (12/16/2019): LVEF 20-25%.  Severely SILVIANO.  Mild MR.  Mild TR. ICD-10-CM ICD-9-CM    1. NICM (nonischemic cardiomyopathy) (HCC)  V91.6 280.3 METABOLIC PANEL, BASIC      CBC W/O DIFF      CANCELED: METABOLIC PANEL, BASIC      CANCELED: CBC W/O DIFF   2.  Longstanding persistent atrial fibrillation (HCC)  I48.11 427.31 AMB POC EKG ROUTINE W/ 12 LEADS, INTER & REP      METABOLIC PANEL, BASIC CBC W/O DIFF      ECHO ADULT FOLLOW-UP OR LIMITED      CANCELED: METABOLIC PANEL, BASIC      CANCELED: CBC W/O DIFF   3. Morbid obesity with BMI of 45.0-49.9, adult ([facility])  E66.01 278.01     Z68.42 V85.42    4. CHF NYHA class II, chronic, systolic ([facility])  F03.89 113.80 METABOLIC PANEL, BASIC     428.0 CBC W/O DIFF      ECHO ADULT FOLLOW-UP OR LIMITED      CANCELED: METABOLIC PANEL, BASIC      CANCELED: CBC W/O DIFF   5. Chronic anticoagulation  Z79.01 V58.61    6. Pre-procedure lab exam  S43.163 Z73.21 METABOLIC PANEL, BASIC      CBC W/O DIFF      CANCELED: METABOLIC PANEL, BASIC      CANCELED: CBC W/O DIFF     ECG AFIB RVR      Persistent AF/typical AFL: Minimally symptomatic, but has rapid rate & now significant cardiomyopathy, LVEF on echo today 20-25%.  He has failed cardioversion, has atrial dilation. Previously discussed hybrid ablation, but unlikely to be able to maintain NSR post ablation. He did not want it    Given declining LVEF & RVR, recommend AV node ablation & biventricular ICD implant. Amos Deshpande has had payments from prior cardioversion procedures, which has been a consideration for him in deciding how to proceed; states he has paid off last cardioversion now.  Little ability to increase rate control due to low normal BP; however, will increase Toprol XL to 50 mg po bid temporarily.    Reviewed risks/benefits of AV node ablation & biventricular ICD.  He agrees to proceed with scheduling.    He told me his TSH was rechecked by PCP and low  He waits to see endocrinologist in Feb  It is reasonable to wait and treat possible hyperthyroid but there is risk of sudden death and CHF  He agrees to wait and wants to come back in 3 months   Will recheck echo  I am concerned if his V rate will be controlled for next 3 months  Anticoagulation: Currently on Eliquis, but this is no longer preferred by his insurance.  Stop Eliquis, start Xarelto 20 mg po daily for embolic CVA prophylaxis.  Denies bleeding issues.     Future Appointments   Date Time Provider Shea Sneed   5/5/2022  1:00 PM VASCULAR, RASHI SNOWDEN AMB   5/5/2022  2:00 PM MD ALO Rosa AMB           Thank you for involving me in this patient's care and please call with further concerns or questions. Rick Anand M.D.    Electrophysiology/Cardiology   [facility]   [place] [address]                     [address] [facility], [address] [place] [address]   [phone]                                        [phone]

## 2022-01-26 ENCOUNTER — TELEPHONE (OUTPATIENT)
Dept: CARDIOLOGY CLINIC | Age: 57
End: 2022-01-26

## 2022-01-26 NOTE — TELEPHONE ENCOUNTER
Requested and received thyroid profile results from PCP. Given to [facility] NP for review.

## 2022-01-28 ENCOUNTER — DOCUMENTATION ONLY (OUTPATIENT)
Dept: CARDIOLOGY CLINIC | Age: 57
End: 2022-01-28

## 2022-01-28 ENCOUNTER — TELEPHONE (OUTPATIENT)
Dept: CARDIOLOGY CLINIC | Age: 57
End: 2022-01-28

## 2022-01-28 RX ORDER — DIGOXIN 250 MCG
TABLET ORAL
Qty: 90 TABLET | Refills: 0 | Status: SHIPPED | OUTPATIENT
Start: 2022-01-28 | End: 2022-05-05

## 2022-01-28 RX ORDER — ALLOPURINOL 100 MG/1
TABLET ORAL
Qty: 30 TABLET | Refills: 0 | OUTPATIENT
Start: 2022-01-28

## 2022-01-28 RX ORDER — ALLOPURINOL 100 MG/1
100 TABLET ORAL DAILY
Qty: 90 TABLET | Refills: 1 | Status: SHIPPED | OUTPATIENT
Start: 2022-01-28 | End: 2022-07-29

## 2022-01-28 NOTE — TELEPHONE ENCOUNTER
Request for Digoxin 250 mcg daily. Last office visit 1/25/22, next office visit 5/5/22. Refills per verbal order from Dr. Dany Echavarria.

## 2022-01-28 NOTE — PROGRESS NOTES
TSH low but free T3 and 4 normal  I had told him to see endocrinologist and get back with us about BIV ICD and AV node ablation.  I do not think his AF RVR is caused by hyperthyroid but ok to wait for him to complete endocrine evaluation

## 2022-01-28 NOTE — TELEPHONE ENCOUNTER
VO per Dr. Ramos herrera to refill    Attempted to reach patient by telephone. A message was left notifying refill sent to pharmacy. Patient to return call with any further questions.

## 2022-01-28 NOTE — TELEPHONE ENCOUNTER
Patient not sure why refill for allopurinol 100mg was denied, please advise          [phone]      [place]  [phone]

## 2022-02-02 ENCOUNTER — HOSPITAL ENCOUNTER (OUTPATIENT)
Dept: ULTRASOUND IMAGING | Age: 57
Discharge: HOME | End: 2022-02-02

## 2022-02-02 DIAGNOSIS — R79.89 ABNORMAL TSH: ICD-10-CM

## 2022-02-08 ENCOUNTER — DOCUMENTATION ONLY (OUTPATIENT)
Dept: CARDIOLOGY CLINIC | Age: 57
End: 2022-02-08

## 2022-02-08 NOTE — PROGRESS NOTES
Received report of thyroid US dated 02/02/2022. No nodule. Mildly atrophic & heterogeneously hypoechoic thyroid gland, findings which can be seen in chronic autoimmune thyroiditis.

## 2022-03-19 PROBLEM — I48.91 A-FIB (HCC): Status: ACTIVE | Noted: 2017-07-01

## 2022-03-19 PROBLEM — J90 PLEURAL EFFUSION, BILATERAL: Status: ACTIVE | Noted: 2017-07-01

## 2022-03-19 PROBLEM — E66.01 OBESITY, MORBID (HCC): Status: ACTIVE | Noted: 2018-08-30

## 2022-03-19 PROBLEM — R60.1 ANASARCA: Status: ACTIVE | Noted: 2017-07-01

## 2022-03-20 PROBLEM — Z01.89 ENCOUNTER FOR CARDIOVERSION PROCEDURE: Status: ACTIVE | Noted: 2018-11-30

## 2022-03-20 PROBLEM — R07.9 ACUTE CHEST PAIN: Status: ACTIVE | Noted: 2017-07-01

## 2022-03-25 ENCOUNTER — TELEPHONE (OUTPATIENT)
Dept: CARDIOLOGY CLINIC | Age: 57
End: 2022-03-25

## 2022-03-25 NOTE — TELEPHONE ENCOUNTER
Dr. Damian Howard had increased meds for rate control at last visit. Is rate now controlled? If thyroid eval is complete & he's now being treated, Dr. Damian Howard said he could discuss biventricular ICD & AV node ablation. If he's agreeable, ok to go ahead & put him on the schedule for procedure sometime shortly after his next appt with Dr. Damian Howard. He has repeat echo pending then as well. If he doesn't want to schedule procedure yet, ok to wait & discuss with Dr. Damian Howard at upcoming visit.       Future Appointments   Date Time Provider Shea Snede   5/5/2022  1:00 PM RASHI ESPINOSA AMB   5/5/2022  2:00 PM MD ALO Hernandez BS AMB

## 2022-03-25 NOTE — TELEPHONE ENCOUNTER
Pt would like to discuss with nurse about what's next after seeing his endocrinologist as advised by dr. Lito Rodriguez. Please advise.      Phone: [phone]

## 2022-03-27 ENCOUNTER — DOCUMENTATION ONLY (OUTPATIENT)
Dept: CARDIOLOGY CLINIC | Age: 57
End: 2022-03-27

## 2022-03-28 ENCOUNTER — TELEPHONE (OUTPATIENT)
Dept: CARDIOLOGY CLINIC | Age: 57
End: 2022-03-28

## 2022-03-28 RX ORDER — METHIMAZOLE 5 MG/1
5 TABLET ORAL
COMMUNITY
Start: 2022-03-11

## 2022-03-28 RX ORDER — DAPAGLIFLOZIN AND METFORMIN HYDROCHLORIDE 5; 1000 MG/1; MG/1
1 TABLET, FILM COATED, EXTENDED RELEASE ORAL 2 TIMES DAILY
COMMUNITY
Start: 2022-03-21

## 2022-03-28 NOTE — TELEPHONE ENCOUNTER
Attempted to reach patient by telephone. A message was left for return call.      Future Appointments   Date Time Provider Shea Sneed   5/5/2022  1:00 PM VASCULAR, RASHI MYLES   5/5/2022  2:00 PM MD ALO Rosa AMB

## 2022-03-28 NOTE — TELEPHONE ENCOUNTER
(Last office visit, Dr. Juanpablo Bush increased Toprol XL to 50 mg bid temporarily for rate control; previously was taking once daily)    Verified patient with two types of identifiers. Pt states his heart rate has been doing much better and he feels \"great\" and is now able to do yard work. He reports HR mid 60s to mid 80s. He said that endocrinologist recently prescribed tapazole twice a week for thyroid. He has noted low BPs, around 91/50, with DBP as low as 45, and experiences some lightheadedness on days that he takes tapazole. Instructed pt to check BP before each dose of metoprolol to make sure SBP is at least 100 and DBP is at least 60, and hold if BP is too low. Pt declines to schedule ICD with AV node ablation at this time, and wants to wait to discuss with Dr. Juanpablo Bush at next office visit. Will ask MD/ NP for further recommendations to see if med adjustments need to be made.

## 2022-03-28 NOTE — TELEPHONE ENCOUNTER
Per Dr. Gary Apple endocrinologist's assessment and treatment plan note\"    Verified patient with two types of identifiers. Patient states she went to see Dr. James Goodrich with [facility]. Will request last office note. Patient also notes he was started on Ozempic, Tapazole, and Xigduo. Patient verbalized understanding and will call with any other questions.

## 2022-03-29 RX ORDER — LOSARTAN POTASSIUM 25 MG/1
12.5 TABLET ORAL DAILY
Qty: 45 TABLET | Refills: 1
Start: 2022-03-29 | End: 2022-09-06

## 2022-03-29 NOTE — TELEPHONE ENCOUNTER
Verified patient with two types of identifiers. Provided NP recommendation to lower losartan dose to 12.5 mg. Pt will continue to check BP twice a day, and will let us know if he continues to get low readings. Patient verbalized understanding and will call with any other questions. Requested Prescriptions     Signed Prescriptions Disp Refills    losartan (COZAAR) 25 mg tablet 45 Tablet 1     Sig: Take 0.5 Tablets by mouth daily.      Authorizing Provider: Austen Perea     Ordering User: Marvin Ivey

## 2022-03-29 NOTE — TELEPHONE ENCOUNTER
Decrease losartan to 12.5 mg po daily to allow him to continue the higher dose of Toprol XL. With low LVEF, he stands to benefit if he can tolerate even a small dose of ARB. If BP is still significantly low, maybe just hold the losartan on the days that he takes tapazole, since he states lightheadedness occurs on those days in particular.

## 2022-04-26 ENCOUNTER — OFFICE VISIT (OUTPATIENT)
Dept: SLEEP MEDICINE | Age: 57
End: 2022-04-26
Payer: COMMERCIAL

## 2022-04-26 ENCOUNTER — DOCUMENTATION ONLY (OUTPATIENT)
Dept: SLEEP MEDICINE | Age: 57
End: 2022-04-26

## 2022-04-26 VITALS
SYSTOLIC BLOOD PRESSURE: 111 MMHG | TEMPERATURE: 97.9 F | HEART RATE: 87 BPM | OXYGEN SATURATION: 96 % | DIASTOLIC BLOOD PRESSURE: 70 MMHG

## 2022-04-26 DIAGNOSIS — I48.11 LONGSTANDING PERSISTENT ATRIAL FIBRILLATION (HCC): ICD-10-CM

## 2022-04-26 DIAGNOSIS — I50.22 CHRONIC SYSTOLIC CONGESTIVE HEART FAILURE (HCC): ICD-10-CM

## 2022-04-26 DIAGNOSIS — G47.31 COMPLEX SLEEP APNEA SYNDROME: Primary | ICD-10-CM

## 2022-04-26 PROCEDURE — 99204 OFFICE O/P NEW MOD 45 MIN: CPT | Performed by: INTERNAL MEDICINE

## 2022-04-26 NOTE — PATIENT INSTRUCTIONS
[address] [facility] [place]. [place], [address]  Tel.  [phone]  Fax. [address] [facility]  [place], [address] [facility]  Tel.  [phone]  Fax. [phone] [address]  Tel.  [phone]  Fax. [phone]     Sleep Apnea: After Your Visit  Your Care Instructions  Sleep apnea occurs when you frequently stop breathing for 10 seconds or longer during sleep. It can be mild to severe, based on the number of times per hour that you stop breathing or have slowed breathing. Blocked or narrowed airways in your nose, mouth, or throat can cause sleep apnea. Your airway can become blocked when your throat muscles and tongue relax during sleep. Sleep apnea is common, occurring in 1 out of 20 individuals. Individuals having any of the following characteristics should be evaluated and treated right away due to high risk and detrimental consequences from untreated sleep apnea:  1. Obesity  2. Congestive Heart failure  3. Atrial Fibrillation  4. Uncontrolled Hypertension  5. Type II Diabetes  6. Night-time Arrhythmias  7. Stroke  8. Pulmonary Hypertension  9. High-risk Driving Populations (pilots, truck drivers, etc.)  10. Patients Considering Weight-loss Surgery    How do you know you have sleep apnea? You probably have sleep apnea if you answer 'yes' to 3 or more of the following questions:  S - Have you been told that you Snore? T - Are you often Tired during the day? O - Has anyone Observed you stop breathing while sleeping? P- Do you have (or are being treated for) high blood Pressure? B - Are you obese (Body Mass Index > 35)? A - Is your Age 48years old or older? N - Is your Neck size greater than 16 inches? G - Are you male Gender? A sleep physician can prescribe a breathing device that prevents tissues in the throat from blocking your airway.  Or your doctor may recommend using a dental device (oral breathing device) to help keep your airway open. In some cases, surgery may be needed to remove enlarged tissues in the throat. Follow-up care is a key part of your treatment and safety. Be sure to make and go to all appointments, and call your doctor if you are having problems. It's also a good idea to know your test results and keep a list of the medicines you take. How can you care for yourself at home? · Lose weight, if needed. It may reduce the number of times you stop breathing or have slowed breathing. · Go to bed at the same time every night. · Sleep on your side. It may stop mild apnea. If you tend to roll onto your back, sew a pocket in the back of your pajama top. Put a tennis ball into the pocket, and stitch the pocket shut. This will help keep you from sleeping on your back. · Avoid alcohol and medicines such as sleeping pills and sedatives before bed. · Do not smoke. Smoking can make sleep apnea worse. If you need help quitting, talk to your doctor about stop-smoking programs and medicines. These can increase your chances of quitting for good. · Prop up the head of your bed 4 to 6 inches by putting bricks under the legs of the bed. · Treat breathing problems, such as a stuffy nose, caused by a cold or allergies. · Use a continuous positive airway pressure (CPAP) breathing machine if lifestyle changes do not help your apnea and your doctor recommends it. The machine keeps your airway from closing when you sleep. · If CPAP does not help you, ask your doctor whether you should try other breathing machines. A bilevel positive airway pressure machine has two types of air pressureâone for breathing in and one for breathing out. Another device raises or lowers air pressure as needed while you breathe. · If your nose feels dry or bleeds when using one of these machines, talk with your doctor about increasing moisture in the air. A humidifier may help.   · If your nose is runny or stuffy from using a breathing machine, talk with your doctor about using decongestants or a corticosteroid nasal spray. When should you call for help? Watch closely for changes in your health, and be sure to contact your doctor if:  · You still have sleep apnea even though you have made lifestyle changes. · You are thinking of trying a device such as CPAP. · You are having problems using a CPAP or similar machine. Where can you learn more? Go to [employer]. Enter L296 in the search box to learn more about \"Sleep Apnea: After Your Visit. \"   © 0903-2416 Healthwise, Incorporated. Care instructions adapted under license by [facility] (which disclaims liability or warranty for this information). This care instruction is for use with your licensed healthcare professional. If you have questions about a medical condition or this instruction, always ask your healthcare professional. Ilya Fierro any warranty or liability for your use of this information. PROPER SLEEP HYGIENE    What to avoid  · Do not have drinks with caffeine, such as coffee or black tea, for 8 hours before bed. · Do not smoke or use other types of tobacco near bedtime. Nicotine is a stimulant and can keep you awake. · Avoid drinking alcohol late in the evening, because it can cause you to wake in the middle of the night. · Do not eat a big meal close to bedtime. If you are hungry, eat a light snack. · Do not drink a lot of water close to bedtime, because the need to urinate may wake you up during the night. · Do not read or watch TV in bed. Use the bed only for sleeping and sexual activity. What to try  · Go to bed at the same time every night, and wake up at the same time every morning. Do not take naps during the day. · Keep your bedroom quiet, dark, and cool. · Get regular exercise, but not within 3 to 4 hours of your bedtime. .  · Sleep on a comfortable pillow and mattress.   · If watching the clock makes you anxious, turn it facing away from you so you cannot see the time. · If you worry when you lie down, start a worry book. Well before bedtime, write down your worries, and then set the book and your concerns aside. · Try meditation or other relaxation techniques before you go to bed. · If you cannot fall asleep, get up and go to another room until you feel sleepy. Do something relaxing. Repeat your bedtime routine before you go to bed again. · Make your house quiet and calm about an hour before bedtime. Turn down the lights, turn off the TV, log off the computer, and turn down the volume on music. This can help you relax after a busy day. Drowsy Driving  The [address] [place] [address] Road Traffic Safety Administration cites drowsiness as a causing factor in more than 994,339 police reported crashes annually, resulting in 76,000 injuries and 1,500 deaths. Other surveys suggest 55% of people polled have driven while drowsy in the past year, 23% had fallen asleep but not crashed, 3% crashed, and 2% had and accident due to drowsy driving. Who is at risk? Young Drivers: One study of drowsy driving accidents states that 55% of the drivers were under 25 years. Of those, 75% were male. Shift Workers and Travelers: People who work overnight or travel across time zones frequently are at higher risk of experiencing Circadian Rhythm Disorders. They are trying to work and function when their body is programed to sleep. Sleep Deprived: Lack of sleep has a serious impact on your ability to pay attention or focus on a task. Consistently getting less than the average of 8 hours your body needs creates partial or cumulative sleep deprivation. Untreated Sleep Disorders: Sleep Apnea, Narcolepsy, R.L.S., and other sleep disorders (untreated) prevent a person from getting enough restful sleep. This leads to excessive daytime sleepiness and increases the risk for drowsy driving accidents by up to 7 times.   Medications / Alcohol: Even over the counter medications can cause drowsiness. Medications that impair a drivers attention should have a warning label. Alcohol naturally makes you sleepy and on its own can cause accidents. Combined with excessive drowsiness its effects are amplified. Signs of Drowsy Driving:   * You don't remember driving the last few miles   * You may drift out of your chana   * You are unable to focus and your thoughts wander   * You may yawn more often than normal   * You have difficulty keeping your eyes open / nodding off   * Missing traffic signs, speeding, or tailgating  Prevention-   Good sleep hygiene, lifestyle and behavioral choices have the most impact on drowsy driving. There is no substitute for sleep and the average person requires 8 hours nightly. If you find yourself driving drowsy, stop and sleep. Consider the sleep hygiene tips provided during your visit as well. Medication Refill Policy: Refills for all medications require 1 week advance notice. Please have your pharmacy fax a refill request. We are unable to fax, or call in \"controled substance\" medications and you will need to pick these prescriptions up from our office. [facility] Activation    Thank you for requesting access to [facility]. Please follow the instructions below to securely access and download your online medical record. [facility] allows you to send messages to your doctor, view your test results, renew your prescriptions, schedule appointments, and more. How Do I Sign Up? 1. In your internet browser, go to [email or web address]. 2. Click on the First Time User? Click Here link in the Sign In box. You will see the New Member Sign Up page. 3. Enter your [facility] Access Code exactly as it appears below. You will not need to use this code after youve completed the sign-up process. If you do not sign up before the expiration date, you must request a new code. [facility] Access Code:  Activation code not generated  Current [facility] Status: Active (This is the date your [employer] access code will )    4. Enter the last four digits of your Social Security Number (xxxx) and Date of Birth (mm/dd/yyyy) as indicated and click Submit. You will be taken to the next sign-up page. 5. Create a [employer] ID. This will be your [employer] login ID and cannot be changed, so think of one that is secure and easy to remember. 6. Create a [employer] password. You can change your password at any time. 7. Enter your Password Reset Question and Answer. This can be used at a later time if you forget your password. 8. Enter your e-mail address. You will receive e-mail notification when new information is available in [address] 9. Click Sign Up. You can now view and download portions of your medical record. 10. Click the Download Summary menu link to download a portable copy of your medical information. Additional Information    If you have questions, please call [phone]. Remember, [employer] is NOT to be used for urgent needs. For medical emergencies, dial 911.

## 2022-04-26 NOTE — PROGRESS NOTES
[address] [place]. [place], [address]  Tel.  [phone]  Fax. [address] [facility] [address]  [place], [address] [facility]  Tel.  [phone]  Fax. [phone] [address] [place]  Tel.  [phone]  Fax. [phone]       Anca Wang is a 55y.o. year old male seen for evaluation of a sleep disorder. ASSESSMENT/PLAN:      ICD-10-CM ICD-9-CM    1. Complex sleep apnea syndrome  G47.31 327.21 AMB SUPPLY ORDER      SPLIT CPAP/PSG   2. Chronic systolic congestive heart failure (HCC)  I50.22 428.22      428.0    3. Longstanding persistent atrial fibrillation (HCC)  I48.11 427.31    4. BMI 45.0-49.9, adult [facility])  [id number] V85.42        Patient has a history and examination consistent with the diagnosis of sleep apnea. Follow-up and Dispositions    · Return for telephone follow-up after testing is completed. * The patient currently has a High Risk for having sleep apnea. STOP-BANG score 7.    * Sleep testing was ordered for evaluation due to interim weight loss. New APAP device ordered to expedite care as devices are currently in short supply particularly the Bi-Level Device. The device he is currently using is a health hazard. Orders Placed This Encounter    AMB SUPPLY ORDER     Diagnosis: Obstructive Sleep Apnea ICD-10 Code (G47.33)    Positive Airway Pressure Therapy: Duration of need: 99 months. APAP Device with Heated Humidifer [id number] / [id number]. Minimum Pressure: 9 cmH2O, Maximum Pressure: 20 cmH2O.     Nasal Pillows Combo Mask (Replace) 2 per month.  Nasal Pillows (Replace) 2 per month.  Full Face Mask 1 every 3 months.  Full Face Mask Cushion 1 per month.  Nasal Cushion (Replace) 2 per month.  Nasal Interface Mask 1 every 3 months.  Headgear 1 every 6 months.  Chinstrap 1 every 6 months.  Tubing 1 every 3 months.  Filter(s) Disposable 2 per month.    Filter(s) Non-Disposable 1 every 6 months. .   [address] [facility] for Humidifier (Replace) 1 every 6 months. Perform Mask Fitting per patient preference and comfort - replace as above. Abhi Jacobson MD, [facility]; NPI: [id number]  Electronically signed. 04/26/22    SPLIT CPAP/PSG     Standing Status:   Future     Standing Expiration Date:   10/26/2022     Order Specific Question:   Reason for Exam     Answer:   GEE       * [employer] PAP Device recall and the current recommendation by [employer] to stop using their machine was discussed. He was advised to consider the risks of both continuing to use the device and not using the device to treat his sleep apnea. He was made aware that there is not a clear answer and this is a difficult situation. As for actions, he should register his device by phone or access the website listed below (provided to patient). If he chose to stop using her device he may benefit from avoiding sleeping flat or on his back, this can be done by elevating the head of the bed and using pillows or other positioning devices to promote side sleeping. Use of ozone or other cleaning devices may be contributing to this problem and therefore we advise that you not use a cleaning device and instead follow the manufacturers cleaning instructions of mild soap and water for the tube, tank and mask. Telephone: [phone]   [facility].. [employer]inder/[employer]/e/sleep/communications/src-update    2. Chronic systolic congestive heart failure ([facility] - continue on current regimen, he will continue to monitor his symptoms and follow up with his care provider for reevaluation/adjustment of medications if warranted. I have reviewed the relationship between heart failure as it relates to sleep-disordered breathing.     3. Longstanding persistent atrial fibrillation ([facility] -  continue on current regimen, he will continue to monitor his symptoms and follow up with his care provider for reevaluation/additional management if warranted. I have reviewed the relationship between atrial fibrillation as it relates to sleep-disordered breathing. 4. Recommended a dedicated weight loss program through appropriate diet and exercise regimen as significant weight reduction has been shown to reduce severity of obstructive sleep apnea. * The patient was counseled regarding proper sleep hygiene, with emphasis on ensuring sufficient total sleep time; safe driving and the benefits of exercise and weight loss. * All of his questions were addressed. SUBJECTIVE/OBJECTIVE:    Anca Wang is an 55 y.o. male referred for evaluation for a sleep disorder. He complains of snoring associated with periods of not breathing, excessive daytime sleepiness. Symptoms began several years ago, he was diagnosed with GEE in 2015 (AHI: 87.7 per hour of sleep). He has been on a Bi-Level ST with AVAPS since that time. He usually can fall asleep in 5 minutes. Family or house members note snoring. He denies of symptoms indicative of cataplexy, sleep paralysis or sleep related hallucinations. He denies of a history of unusual movements occurring during sleep. The patient has undergone diagnostic testing for the current problems. Review of Systems:  Constitutional:  significant weight loss ~ 30 lbs since initial testing. Eyes:  No blurred vision  CVS:  No significant chest pain  Pulm:  No significant shortness of breath  GI:  No significant nausea or vomiting  :  No significant nocturia  Musculoskeletal:  No significant joint pain at night  Skin:  No significant rashes  Neuro:  No significant dizziness   Psych:  No active mood issues    Sleep Review of Systems: notable for Negative difficulty falling asleep; Negative awakenings at night; Positive perceived regular dreaming; Negative nightmares; Negative  early morning headaches; Negative  memory problems;  Negative  concentration issues; Negative caffeine;  Negative alcohol;   Negative history of any automobile or occupational accidents due to daytime drowsiness. [place] Sleepiness Score: (P) 8   and Modified F.O.S.Q. Score Total / 2: (P) 19    Visit Vitals  /70   Pulse 87   Temp 97.9 °F (36.6 °C)   Ht (P) 5' 9\" (1.753 m)   SpO2 96%   BMI (P) 47.31 kg/m²           General:   Alert, oriented, not in acute distress   Eyes:  Anicteric Sclerae; intact EOM's   Nose:  No obvious nasal septum deviation    Oropharynx:   Mallampati score 3, thick tongue base, uvula not seen due to low-lying soft palate, narrow tonsilo-pharyngeal pilars, tongue scalloped   Neck:   midline trachea,  no JVD   Chest/Lungs:  symmetrical lung expansion ,clear lung fields on auscultation    CVS:  Irregular rate, irregular rhythm    Extremities:  skin discoloration both extremities, absent edema    Neuro:  No focal deficits; No obvious tremor    Psych:  Normal eye contact; normal  affect, normal countenance          Roger Ariza MD, FAASM  Diplomate American Board of Sleep Medicine  Diplomate in Sleep Medicine - ABP    Electronically signed.  04/26/22

## 2022-05-05 ENCOUNTER — ANCILLARY PROCEDURE (OUTPATIENT)
Dept: CARDIOLOGY CLINIC | Age: 57
End: 2022-05-05
Payer: COMMERCIAL

## 2022-05-05 ENCOUNTER — OFFICE VISIT (OUTPATIENT)
Dept: CARDIOLOGY CLINIC | Age: 57
End: 2022-05-05

## 2022-05-05 VITALS
BODY MASS INDEX: 45.63 KG/M2 | HEART RATE: 109 BPM | SYSTOLIC BLOOD PRESSURE: 110 MMHG | OXYGEN SATURATION: 97 % | WEIGHT: 309 LBS | DIASTOLIC BLOOD PRESSURE: 70 MMHG

## 2022-05-05 VITALS — HEIGHT: 69 IN | WEIGHT: 309 LBS | BODY MASS INDEX: 45.77 KG/M2

## 2022-05-05 DIAGNOSIS — I50.22 SYSTOLIC CHF, CHRONIC (HCC): ICD-10-CM

## 2022-05-05 DIAGNOSIS — Z79.01 CHRONIC ANTICOAGULATION: ICD-10-CM

## 2022-05-05 DIAGNOSIS — I42.8 NICM (NONISCHEMIC CARDIOMYOPATHY) (HCC): ICD-10-CM

## 2022-05-05 DIAGNOSIS — E66.01 MORBID OBESITY WITH BMI OF 45.0-49.9, ADULT (HCC): ICD-10-CM

## 2022-05-05 DIAGNOSIS — I50.22 CHF NYHA CLASS II, CHRONIC, SYSTOLIC (HCC): ICD-10-CM

## 2022-05-05 DIAGNOSIS — I48.11 LONGSTANDING PERSISTENT ATRIAL FIBRILLATION (HCC): Primary | ICD-10-CM

## 2022-05-05 PROCEDURE — 99214 OFFICE O/P EST MOD 30 MIN: CPT | Performed by: INTERNAL MEDICINE

## 2022-05-05 PROCEDURE — 93308 TTE F-UP OR LMTD: CPT | Performed by: INTERNAL MEDICINE

## 2022-05-05 RX ORDER — BLOOD SUGAR DIAGNOSTIC
STRIP MISCELLANEOUS
COMMUNITY
Start: 2022-04-14

## 2022-05-05 RX ORDER — LANOLIN ALCOHOL/MO/W.PET/CERES
1000 CREAM (GRAM) TOPICAL DAILY
COMMUNITY
Start: 2022-04-15

## 2022-05-05 NOTE — PROGRESS NOTES
Cardiac Electrophysiology Office Note     Subjective:       Lilibeth Farmer is a 55 y.o. male patient who presents for follow up of persistent AF. He has repeatedly declined ablation, is on Toprol XL for rate control.  Failed multiple AADs. He said he has lost weight and got DM2 under control with endocrinologist  He feels better  He had echo done and LVEF 35%, slightly improved from last one 3 months ago    Able to tolerate ADLs, but has to take breaks with activities such as working in the yard.     History of tachy induced cardiomyopathy, continues GDMT. LVEF 40% in 07/2020.  NYHA II chronic systolic CHF. Intermittent gouty-type swelling in his feet, specifically great toe area with pain and relieved with meds.  Previously on colchicine, indomethacin, & prednisone and he was better in a day. BP low normal so cannot tolerate more medications. Anticoagulated with Eliquis, denies bleeding issues. Previous:   Failed Tikosyn & Multaq, then amiodarone. S/p DCCV for AF 11/30/2018 & 07/03/2017, 12/2019. Hospitalization 07/2017 for atrial fibrillation with RVR and acute systolic CHF LVEF 49%.  Had cardioversion after DESTINEE did not show thrombus.  Tachycardic on cardizem IV.      Problem List     Encounter for cardioversion procedure ICD-10-CM: Z01.89   ICD-9-CM: V72.85 11/30/2018   Overview Addendum 12/16/2019  8:22 AM by Pepe Zaman MD     12/16/2019       Obesity, morbid [facility]) ICD-10-CM: E66.01   ICD-9-CM: 278.01 8/30/2018     Anasarca ICD-10-CM: R60.1   ICD-9-CM: 782.3 7/1/2017     A-fib ([address] ICD-10-CM: I48.91   ICD-9-CM: 427.31 7/1/2017     Acute chest pain ICD-10-CM: R07.9   ICD-9-CM: 786.50 7/1/2017     Pleural effusion, bilateral ICD-10-CM: J90   ICD-9-CM: 511.9 7/1/2017     Atrial flutter (HCC) ICD-10-CM: I48.92   ICD-9-CM: 427.32 1/29/2015     Persistent atrial fibrillation with RVR ICD-10-CM: I48.19   ICD-9-CM: 427.31 12/3/2014     Obesity ICD-10-CM: E66.9   ICD-9-CM: 278.00 12/3/2014     GEE (obstructive sleep apnea) ICD-10-CM: G47.33   ICD-9-CM: 327.23 12/3/2014         Past Medical History:   Diagnosis Date   · Arrhythmia   · Sleep apnea     Past Surgical History: none   Procedure Laterality Date   · SD CARDIOVERSION ELECTIVE ARRHYTHMIA EXTERNAL 7/3/2017     · SD CARDIOVERSION ELECTIVE ARRHYTHMIA EXTERNAL 11/30/2018         Social History     Socioeconomic History   · Marital status:    Spouse name: Not on file   · Number of children: Not on file   · Years of education: Not on file   · Highest education level: Not on file   Occupational History   · Not on file   Tobacco Use   · Smoking status: Never Smoker   · Smokeless tobacco: Former User   Substance and Sexual Activity   · Alcohol use: No   Alcohol/week: 0.0 standard drinks   Comment: Occassionally social   · Drug use: No   · Sexual activity: Not on file   Other Topics Concern   · Not on file   Social History Narrative   · Not on file     Social Determinants of Health     Financial Resource Strain:   · Difficulty of Paying Living Expenses:   Food Insecurity:   · Worried About [address] in the Last Year:   · [address] in the Last Year:   Transportation Needs:   · Lack of Transportation (Medical):    · Lack of Transportation (Non-Medical):   Physical Activity:   · Days of Exercise per Week:   · Minutes of Exercise per Session:   Stress:   · Feeling of Stress :   Social Connections:   · Frequency of Communication with Friends and Family:   · Frequency of Social Gatherings with Friends and Family:   · Attends Nondenominational Services:   · Active Member of Clubs or Organizations:   · Attends Club or Organization Meetings:   · Marital Status:   Intimate Partner Violence:   · Fear of Current or Ex-Partner:   · Emotionally Abused:   · Physically Abused:   · Sexually Abused:     Family History   Problem Relation Age of Onset   · Heart Disease Father   · Stroke Mother     Current Outpatient Medications on File Prior to Visit Medication Sig Dispense Refill    cyanocobalamin (Vitamin B-12) 1,000 mcg tablet Take 1,000 mcg by mouth daily.  OneTouch Verio test strips strip USE 1 STRIP TO CHECK GLUCOSE ONCE DAILY      furosemide (LASIX) 40 mg tablet Take 1 tablet by mouth once daily 90 Tablet 1    losartan (COZAAR) 25 mg tablet Take 0.5 Tablets by mouth daily. 45 Tablet 1    semaglutide (OZEMPIC) 0.25 mg or 0.5 mg/dose (2 mg/1.5 ml) subq pen 0.5 mg every seven (7) days. Every Monday      methIMAzole (TAPAZOLE) 5 mg tablet 5 mg every Monday and Friday.  dapagliflozin-metformin (Xigduo XR) 5-1,000 mg TBph Take 1 Tablet by mouth two (2) times a day.  digoxin (LANOXIN) 0.25 mg tablet Take 1 tablet by mouth once daily 90 Tablet 0    allopurinoL (ZYLOPRIM) 100 mg tablet Take 1 Tablet by mouth daily. 90 Tablet 1    ferrous sulfate 325 mg (65 mg iron) tablet Take  by mouth Daily (before breakfast).  metoprolol succinate (TOPROL-XL) 50 mg XL tablet Take 1 Tablet by mouth two (2) times a day. 180 Tablet 1    rivaroxaban (Xarelto) 20 mg tab tablet Take 1 Tablet by mouth daily (with dinner). 90 Tablet 1    cholecalciferol, vitamin D3, (Vitamin D3) 50 mcg (2,000 unit) tab Take  by mouth.  atorvastatin (LIPITOR) 10 mg tablet Take 10 mg by mouth daily. No current facility-administered medications on file prior to visit.        No Known Allergies   Past Medical History:   Diagnosis Date   · Arrhythmia   · Sleep apnea     Past Surgical History:   Procedure Laterality Date   · MD CARDIOVERSION ELECTIVE ARRHYTHMIA EXTERNAL 7/3/2017     · MD CARDIOVERSION ELECTIVE ARRHYTHMIA EXTERNAL 11/30/2018       Family History   Problem Relation Age of Onset   · Heart Disease Father   · Stroke Mother     Social History     Tobacco Use   · Smoking status: Never Smoker   · Smokeless tobacco: Former User   Substance Use Topics   · Alcohol use: No   Alcohol/week: 0.0 standard drinks   Comment: Occassionally social         Review of Systems:  All other review of systems otherwise negative. Constitutional: Negative for fever, chills, + intentional weight loss. HEENT: Negative for nosebleeds, vision changes. Respiratory: No cough, no hemoptysis, sputum production, and wheezing. Cardiovascular: Negative for chest pain, palpitations, orthopnea, claudication, he has leg swelling, no syncope, and no PND.  He has less shortness of breath exertion   Gastrointestinal: Negative for nausea, vomiting, diarrhea, constipation, blood in stool and melena. Genitourinary: Negative for dysuria, and hematuria. Musculoskeletal: Negative for myalgias.  + intermittent edema, discomfort, & warmth in feet, specifically in great toes. Skin: Negative for rash. Heme: Does not bleed or bruise easily. Neurological: Negative for speech change and focal weakness.       Objective:   Visit Vitals  /70 (BP 1 Location: Left arm, BP Patient Position: Sitting, BP Cuff Size: Adult long)   Pulse (!) 109   Wt 309 lb (140.2 kg)   SpO2 97%   BMI (P) 45.63 kg/m²         Physical Exam:   Constitutional:  Well-nourished. No distress.     Head: Normocephalic and atraumatic. Eyes: Pupils are equal, round. Neck: Supple. No JVD present. Cardiovascular: fast rate, irregular rhythm.  Exam reveals no gallop and no friction rub.  No murmur heard. Pulmonary/Chest: Effort normal and breath sounds normal. No wheezes. Abdominal: Soft, morbidly obese. Musculoskeletal: Moves extremities independently.  Normal gait. Vasc/lymphatic: Pretibial bilateral edema.     Neurological: Alert, oriented. Skin: Skin is warm and dry.  Venous stasis changes bilateral lower extremities. Psychiatric: Normal mood and affect. Behavior is normal. Judgment and thought content normal.         Assessment/Plan:     Imaging/Studies:   Echo (07/28/2020): LVEF 40%, mildly dilated LV, mod concentric LVH.  Mildly dilated LA.      DESTINEE (12/16/2019): LVEF 20-25%.  Severely SILVIANO.  Mild MR. Sherron James TR.         ICD-10-CM ICD-9-CM    1. Longstanding persistent atrial fibrillation (HCC)  I48.11 427.31 ECHO ADULT FOLLOW-UP OR LIMITED   2. NICM (nonischemic cardiomyopathy) (HCC)  I42.8 425.4 ECHO ADULT FOLLOW-UP OR LIMITED   3. Morbid obesity with BMI of 45.0-49.9, adult (HCC)  E66.01 278.01 ECHO ADULT FOLLOW-UP OR LIMITED    Z68.42 V85.42    4. CHF NYHA class II, chronic, systolic (HCC)  T21.22 765.23 ECHO ADULT FOLLOW-UP OR LIMITED     428.0    5. Systolic CHF, chronic (HCC)  I50.22 428.22 ECHO ADULT FOLLOW-UP OR LIMITED     428.0    6. Chronic anticoagulation  Z79.01 V58.61 ECHO ADULT FOLLOW-UP OR LIMITED         Persistent AF/typical AFL:   Symptomatic with fatigue, failed multiple AADs.  Rate today is not controlled, unable to increase Toprol XL due to low BP but I asked him to increase toprol to 75 mg bid or even 100 mg bid  He want to stop losartan while I recommended entresto  We do not agree today on changing medication  He is on digoxin 0.25 mg po daily for further rate control.    He has declined endocardial & epicardial hybrid ablation previously.  With severe SILVIANO on prior DESTINEE, likely unable to maintain NSR post ablation now  Therefore I discussed possible AV node ablation & biventricular pacemaker, including risks/benefits of sudden death prevention and he did not want it  He wants to continue to lose weight and wants to recheck LVEF in 1 year only. Anticoagulation: Continue Eliquis for embolic CVA prophylaxis. No significant bleeding issues. Future Appointments   Date Time Provider Shea Sneed   5/31/2022  8:30 PM BEDRM 1 [address] [facility],   [address] SLEEP LAB [place]   5/4/2023  3:00 PM RASHI WHITLEY AMB   5/4/2023  4:00 PM MD ALO Alejandro BS AMB          Thank you for involving me in this patient's care and please call with further concerns or questions. Hi Nunez M.D.    Electrophysiology/Cardiology   [address] [place] [address] [facility] [place]   [address]                     [address] [facility] Mina [facility]ep[facility], [address] [place] [id number]   [phone]

## 2022-05-08 LAB
ECHO AO ROOT DIAM: 3.3 CM
ECHO AO ROOT INDEX: 1.33 CM/M2
ECHO LA DIAMETER INDEX: 1.97 CM/M2
ECHO LA DIAMETER: 4.9 CM
ECHO LA TO AORTIC ROOT RATIO: 1.48
ECHO LV EDV A2C: 212 ML
ECHO LV EDV A4C: 190 ML
ECHO LV EDV BP: 198 ML (ref 67–155)
ECHO LV EDV INDEX A4C: 76 ML/M2
ECHO LV EDV INDEX BP: 80 ML/M2
ECHO LV EDV NDEX A2C: 85 ML/M2
ECHO LV EJECTION FRACTION A2C: 40 %
ECHO LV EJECTION FRACTION A4C: 33 %
ECHO LV EJECTION FRACTION BIPLANE: 37 % (ref 55–100)
ECHO LV ESV A2C: 126 ML
ECHO LV ESV A4C: 127 ML
ECHO LV ESV BP: 126 ML (ref 22–58)
ECHO LV ESV INDEX A2C: 51 ML/M2
ECHO LV ESV INDEX A4C: 51 ML/M2
ECHO LV ESV INDEX BP: 51 ML/M2
ECHO LV FRACTIONAL SHORTENING: 18 % (ref 28–44)
ECHO LV INTERNAL DIMENSION DIASTOLE INDEX: 2.41 CM/M2
ECHO LV INTERNAL DIMENSION DIASTOLIC: 6 CM (ref 4.2–5.9)
ECHO LV INTERNAL DIMENSION SYSTOLIC INDEX: 1.97 CM/M2
ECHO LV INTERNAL DIMENSION SYSTOLIC: 4.9 CM
ECHO LV IVSD: 1.3 CM (ref 0.6–1)
ECHO LV MASS 2D: 368.8 G (ref 88–224)
ECHO LV MASS INDEX 2D: 148.1 G/M2 (ref 49–115)
ECHO LV POSTERIOR WALL DIASTOLIC: 1.4 CM (ref 0.6–1)
ECHO LV RELATIVE WALL THICKNESS RATIO: 0.47

## 2022-05-17 ENCOUNTER — TELEPHONE (OUTPATIENT)
Dept: SLEEP MEDICINE | Age: 57
End: 2022-05-17

## 2022-05-17 DIAGNOSIS — G47.31 COMPLEX SLEEP APNEA SYNDROME: ICD-10-CM

## 2022-05-17 DIAGNOSIS — G47.33 OSA (OBSTRUCTIVE SLEEP APNEA): Primary | ICD-10-CM

## 2022-05-17 NOTE — TELEPHONE ENCOUNTER
Salazar Going DENIED    Procedures:  74595 (CPT®) - UT POLYSOM 6/>YRS SLEEP 4/> ADDL NIALL ATTND  44380 (CPT®) - UT POLYSOM 6/>YRS SLEEP W/CPAP 4/> ADDL NIALL ATTND  ORDERING PROVIDER: FERNIE ROBERTS  INSURANCE: [employer]  SOURCE: Phone  SOURCE DETAILS: Mariaelena Yan  CASE/TRACKING #: [id number]  DENIAL REASON: NOT MEDICALLY NECESSARY  CALL REF #: NA  P2P phone#: [phone]  P2P expiration date: NA    MDO CONTACT INFO: DR Samantha Huang  SPOKE TO: SENT EMAIL TO Mikel Reynaga MDO CONTACT DATE: 22  MDO CONTACT TIME: 3:14  REBEL: [id number] Aria Terrell  : 1965  P2P AVAILABLE DOS: 22

## 2022-05-18 NOTE — TELEPHONE ENCOUNTER
Peer to Peer scheduled for 5/19/2022 at 09:00 am.     ESTEVAN Tian-BC, [facility]   Nurse Practitioner  [facility] [address]

## 2022-05-19 NOTE — TELEPHONE ENCOUNTER
Peer to Peer complete. In lab split study denied as patient has prior documented diagnosis of severe GEE. In lab titration study approved. Orders Placed This Encounter    57659 POLYSOMNOGRAPHY CPAP TITRATION     CPAP titration study     Patient currently on Bilevel AVAPS.      Standing Status:   Future     Standing Expiration Date:   5/19/2023     Scheduling Instructions:      Route to Dr. Sunita Duncan Specific Question:   Reason for Exam     Answer:   GEE/heart failure     Auth # [id number]    Hanley Riedel, FNP-BC, [facility]   Nurse Practitioner  [address] [place] [address]

## 2022-05-31 ENCOUNTER — HOSPITAL ENCOUNTER (OUTPATIENT)
Dept: SLEEP MEDICINE | Age: 57
Discharge: HOME | End: 2022-05-31
Attending: INTERNAL MEDICINE
Payer: COMMERCIAL

## 2022-05-31 VITALS
SYSTOLIC BLOOD PRESSURE: 100 MMHG | OXYGEN SATURATION: 95 % | HEART RATE: 60 BPM | HEIGHT: 69 IN | TEMPERATURE: 98.1 F | WEIGHT: 309 LBS | DIASTOLIC BLOOD PRESSURE: 63 MMHG | BODY MASS INDEX: 45.77 KG/M2

## 2022-05-31 DIAGNOSIS — G47.31 COMPLEX SLEEP APNEA SYNDROME: ICD-10-CM

## 2022-05-31 PROCEDURE — 95811 POLYSOM 6/>YRS CPAP 4/> PARM: CPT | Performed by: INTERNAL MEDICINE

## 2022-06-01 NOTE — PROGRESS NOTES
[facility]. [place], [address]  Tel.  [phone]  Fax. [address] [facility] [address]  [place], [address] [facility]  Tel.  [phone]  Fax. [phone] [address] [facility] [place]   Tel.  [phone]  Fax. [phone]     Sleep Study Technical Notes        PRE-Test:  Betzaida Walters (: 1965) arrived in the lobby. Patient was greeted and screening questions asked. The patient was taken directly to his room.  Vitals:  o Temperature (98.3)   o BP (100/63)   o SaO2 (95)   o Weight per patient (309)   Procedure explained to the patient and questions were answered. The patient expressed understanding of the procedure. Electrodes were applied without incident. The patient was placed in bed and the study was started.  PAP mask acclimation for 3min. Patient did tolerate mask. Masked used was Resmed F30i size medium; F&P Vitera size medium   Sleep aid taken:  No      Acquisition Notes:   Lights off: 9:30pm     Respiratory events: Hypopnea   ECG:  Irregular Irregular rhythm   Snoring:  No audible snoring   CPAP therapy started at 5cm H2O and increased to 13cm H2O to reduce/eliminate respiratory events. Mask leak average 3.    Positional therapy with: Other comments: Pillows  o Patient had caregiver in attendance:  No  o Patient watched TV or on phone after lights out for 60 minutes  o Patient slept with positional therapy:  No  o Patient slept with head of bed elevated:  Yes - 3 pillows  o Patient wore an oral appliance:  No  o Patient to bathroom 1 time        POST Test:   Patient was awakened. Electrodes were removed. The patient was discharged after answering the Post Questionnaire. Patient stated that he was alert and ok to drive.  Equipment and room cleaned per infection control policy.

## 2022-06-02 ENCOUNTER — TELEPHONE (OUTPATIENT)
Dept: SLEEP MEDICINE | Age: 57
End: 2022-06-02

## 2022-06-02 DIAGNOSIS — G47.33 OSA (OBSTRUCTIVE SLEEP APNEA): Primary | ICD-10-CM

## 2022-06-15 ENCOUNTER — DOCUMENTATION ONLY (OUTPATIENT)
Dept: SLEEP MEDICINE | Age: 57
End: 2022-06-15

## 2022-06-15 NOTE — TELEPHONE ENCOUNTER
Orders Placed This Encounter    AMB SUPPLY ORDER     Diagnosis: Obstructive Sleep Apnea ICD-10 Code (G47.33)    Positive Airway Pressure Therapy: Duration of need: 99 months. APAP Device with Heated Humidifer [id number] / [id number]. Minimum Pressure: 12 cmH2O, Maximum Pressure: 15 cmH2O.     Nasal Pillows Combo Mask (Replace) 2 per month.  Nasal Pillows (Replace) 2 per month.  Full Face Mask 1 every 3 months.  Full Face Mask Cushion 1 per month.  Nasal Cushion (Replace) 2 per month.  Nasal Interface Mask 1 every 3 months.  Headgear 1 every 6 months.  Chinstrap 1 every 6 months.  Tubing 1 every 3 months.  Filter(s) Disposable 2 per month.  Filter(s) Non-Disposable 1 every 6 months. .   [facility] for Humidifier (Replace) 1 every 6 months. Perform Mask Fitting per patient preference and comfort - replace as above. Abhi Jacobson MD, FAASM; NPI: [id number]  Electronically signed. 06/14/22

## 2022-09-06 RX ORDER — LOSARTAN POTASSIUM 25 MG/1
TABLET ORAL
Qty: 90 TABLET | Refills: 0 | Status: SHIPPED | OUTPATIENT
Start: 2022-09-06

## 2022-09-06 NOTE — TELEPHONE ENCOUNTER
Requested Prescriptions     Signed Prescriptions Disp Refills    losartan (COZAAR) 25 mg tablet 90 Tablet 0     Sig: Take 1 tablet by mouth once daily     Authorizing Provider: Mayank Roy     Ordering User: Yahaira Brenner     Refills per verbal order from IRMA Bauer NP. Last visit: 5/522  Next visit: 5/4/23  Needs updated labs for further refills. Requested any recent labs from PCP.

## 2022-09-08 ENCOUNTER — DOCUMENTATION ONLY (OUTPATIENT)
Dept: CARDIOLOGY CLINIC | Age: 57
End: 2022-09-08

## 2022-09-08 NOTE — PROGRESS NOTES
Received records dated 11/23/2021 from Coleman Stevenson NP.     Labs (10/25/2021):  Free T4 1.21  T3 1.17  TSH 0.39  WBC 9.7  Hgb 11.6  Hct 35.7  Plt 266  Na 140  K 4.3  Glu 130  Cr 1.04  ALT 17  AST 17  Tbili 0.8  Mg 1.9  Hgb A1c 7.2

## 2022-10-02 NOTE — PROGRESS NOTES
Cardiac Cath Lab Procedure Area Arrival Note:    Clif Stanley arrived to Cardiac Cath Lab, Procedure Area. Patient identifiers verified with NAME and DATE OF BIRTH. Procedure verified with patient. Consent forms verified. Allergies verified. Patient informed of procedure and plan of care. Questions answered with review. Patient voiced understanding of procedure and plan of care. Patient on cardiac monitor, non-invasive blood pressure, SPO2 monitor. On O2 @ 2 lpm via NC.  IV of NS on pump at 25 ml/hr. Patient status doing well without problems. Patient is A&Ox 4. Patient reports no pain. Patient medicated during procedure with orders obtained and verified by Dr. Nikita Rush. Refer to patients Cardiac Cath Lab PROCEDURE REPORT for vital signs, assessment, status, and response during procedure, printed at end of case. Printed report on chart or scanned into chart. Female

## 2022-10-04 RX ORDER — FUROSEMIDE 40 MG/1
TABLET ORAL
Qty: 90 TABLET | Refills: 0 | Status: SHIPPED | OUTPATIENT
Start: 2022-10-04

## 2022-10-04 NOTE — TELEPHONE ENCOUNTER
Requested Prescriptions     Signed Prescriptions Disp Refills    furosemide (LASIX) 40 mg tablet 90 Tablet 0     Sig: Take 1 tablet by mouth once daily     Authorizing Provider: Johanny Colunga     Ordering User: Zenon Grecoills per verbal order from IRMA Velazquez NP.    Last visit: 5/5/22  Next visit: 5/4/23 The patient is a 64y Male complaining of seizures.

## 2022-10-26 LAB — HBA1C MFR BLD HPLC: 6.5 %

## 2023-01-06 ENCOUNTER — TELEPHONE (OUTPATIENT)
Dept: CARDIOLOGY CLINIC | Age: 58
End: 2023-01-06

## 2023-01-06 DIAGNOSIS — I50.22 SYSTOLIC CHF, CHRONIC (HCC): Primary | ICD-10-CM

## 2023-01-06 NOTE — TELEPHONE ENCOUNTER
Identifiers x 2. Informed of need for labs. He will have completed with endocrinology labs in the next couple of weeks. Will fax all results to our office. Lab requisition at . Verbalized understanding.

## 2023-01-06 NOTE — TELEPHONE ENCOUNTER
Per Leobardo Merlin, NP:  Received refill request for furosemide 40 mg po tabs. Last labs done 11/2021. Please check BMP or obtain results if done elsewhere in the past year. Refill authorized. Attempted to reach patient by telephone. A message was left for return call.

## 2023-01-24 ENCOUNTER — DOCUMENTATION ONLY (OUTPATIENT)
Dept: CARDIOLOGY CLINIC | Age: 58
End: 2023-01-24

## 2023-01-24 NOTE — PROGRESS NOTES
Received labs dated 10/21/2022.     WBC 10.2  Hgb 12.8  Hct 39.6  Plt 274    Glu 107  BUN 14  Cr 1.14  Na 139  K 4.8  Tbili 0.7  AST 15  ALT 11    Tchol 132  Tri 128  HDL 41  LDL 68    Hgb A1c 6.5    Free T4 1.34  TSH 2.37

## 2023-02-09 RX ORDER — METOPROLOL SUCCINATE 50 MG/1
TABLET, EXTENDED RELEASE ORAL
Qty: 180 TABLET | Refills: 1 | Status: SHIPPED | OUTPATIENT
Start: 2023-02-09

## 2023-02-09 RX ORDER — RIVAROXABAN 20 MG/1
TABLET, FILM COATED ORAL
Qty: 90 TABLET | Refills: 1 | Status: SHIPPED | OUTPATIENT
Start: 2023-02-09

## 2023-02-09 RX ORDER — ALLOPURINOL 100 MG/1
TABLET ORAL
Qty: 90 TABLET | Refills: 1 | Status: SHIPPED | OUTPATIENT
Start: 2023-02-09

## 2023-02-09 RX ORDER — LOSARTAN POTASSIUM 25 MG/1
TABLET ORAL
Qty: 90 TABLET | Refills: 1 | Status: SHIPPED | OUTPATIENT
Start: 2023-02-09

## 2023-02-09 NOTE — TELEPHONE ENCOUNTER
Received refill request for Toprol XL 50 mg po tabs, allopurinol 100 mg po tabs, Xarelto 20 mg po tabs, & losartan 25 mg po tabs. Last labs >1 year ago. Please check BMP or obtain results if done elsewhere in the past year. Refill request authorized.     Future Appointments   Date Time Provider Shea Mccraryi   5/4/2023  3:00 PM RASHI WHITLEY AMB   5/4/2023  4:00 PM MD ALO Mosquera BS AMB

## 2023-02-10 ENCOUNTER — DOCUMENTATION ONLY (OUTPATIENT)
Dept: CARDIOLOGY CLINIC | Age: 58
End: 2023-02-10

## 2023-02-10 NOTE — PROGRESS NOTES
Received labs dated 01/20/2023.     WBC 9  Hgb 13.3  Hct 42.5  Plt 291    Glu 112  BUN 14  Cr 1.16  Na 137  K 4.3  Tbili 0.8  AST 18  ALT 16    Tchol 151  Tri 120  HDL 49  LDL 81    Hgb A1c 6.5

## 2023-04-24 ENCOUNTER — TELEPHONE (OUTPATIENT)
Age: 58
End: 2023-04-24

## 2023-05-04 ENCOUNTER — ANCILLARY PROCEDURE (OUTPATIENT)
Dept: CARDIOLOGY CLINIC | Age: 58
End: 2023-05-04

## 2023-05-04 VITALS — WEIGHT: 309 LBS | HEIGHT: 69 IN | BODY MASS INDEX: 45.77 KG/M2

## 2023-05-04 DIAGNOSIS — E66.01 MORBID OBESITY WITH BMI OF 45.0-49.9, ADULT (HCC): ICD-10-CM

## 2023-05-04 DIAGNOSIS — I50.22 CHF NYHA CLASS II, CHRONIC, SYSTOLIC (HCC): ICD-10-CM

## 2023-05-04 DIAGNOSIS — I48.11 LONGSTANDING PERSISTENT ATRIAL FIBRILLATION (HCC): ICD-10-CM

## 2023-05-04 DIAGNOSIS — Z79.01 CHRONIC ANTICOAGULATION: ICD-10-CM

## 2023-05-04 DIAGNOSIS — I50.22 SYSTOLIC CHF, CHRONIC (HCC): ICD-10-CM

## 2023-05-04 DIAGNOSIS — I42.8 NICM (NONISCHEMIC CARDIOMYOPATHY) (HCC): ICD-10-CM

## 2023-05-05 ENCOUNTER — DOCUMENTATION ONLY (OUTPATIENT)
Dept: CARDIOLOGY CLINIC | Age: 58
End: 2023-05-05

## 2023-05-05 LAB
ECHO AO ASC DIAM: 2.8 CM
ECHO AO ASCENDING AORTA INDEX: 1.12 CM/M2
ECHO AO ROOT DIAM: 3.1 CM
ECHO AO ROOT INDEX: 1.24 CM/M2
ECHO AV AREA PEAK VELOCITY: 1.7 CM2
ECHO AV AREA VTI: 1.8 CM2
ECHO AV AREA/BSA PEAK VELOCITY: 0.7 CM2/M2
ECHO AV AREA/BSA VTI: 0.7 CM2/M2
ECHO AV MEAN GRADIENT: 4 MMHG
ECHO AV MEAN VELOCITY: 1 M/S
ECHO AV PEAK GRADIENT: 7 MMHG
ECHO AV PEAK VELOCITY: 1.4 M/S
ECHO AV VELOCITY RATIO: 0.43
ECHO AV VTI: 24 CM
ECHO LA DIAMETER INDEX: 1.73 CM/M2
ECHO LA DIAMETER: 4.3 CM
ECHO LA TO AORTIC ROOT RATIO: 1.39
ECHO LA VOL 2C: 82 ML (ref 18–58)
ECHO LA VOL 4C: 89 ML (ref 18–58)
ECHO LA VOL BP: 91 ML (ref 18–58)
ECHO LA VOL/BSA BIPLANE: 37 ML/M2 (ref 16–34)
ECHO LA VOLUME AREA LENGTH: 99 ML
ECHO LA VOLUME INDEX A2C: 33 ML/M2 (ref 16–34)
ECHO LA VOLUME INDEX A4C: 36 ML/M2 (ref 16–34)
ECHO LA VOLUME INDEX AREA LENGTH: 40 ML/M2 (ref 16–34)
ECHO LV E' LATERAL VELOCITY: 12 CM/S
ECHO LV E' SEPTAL VELOCITY: 11 CM/S
ECHO LV EDV A2C: 109 ML
ECHO LV EDV A4C: 113 ML
ECHO LV EDV BP: 114 ML (ref 67–155)
ECHO LV EDV INDEX A4C: 45 ML/M2
ECHO LV EDV INDEX BP: 46 ML/M2
ECHO LV EDV NDEX A2C: 44 ML/M2
ECHO LV EJECTION FRACTION A2C: 44 %
ECHO LV EJECTION FRACTION A4C: 36 %
ECHO LV EJECTION FRACTION BIPLANE: 42 % (ref 55–100)
ECHO LV ESV A2C: 61 ML
ECHO LV ESV A4C: 72 ML
ECHO LV ESV BP: 66 ML (ref 22–58)
ECHO LV ESV INDEX A2C: 24 ML/M2
ECHO LV ESV INDEX A4C: 29 ML/M2
ECHO LV ESV INDEX BP: 27 ML/M2
ECHO LV FRACTIONAL SHORTENING: 32 % (ref 28–44)
ECHO LV INTERNAL DIMENSION DIASTOLE INDEX: 2.29 CM/M2
ECHO LV INTERNAL DIMENSION DIASTOLIC: 5.7 CM (ref 4.2–5.9)
ECHO LV INTERNAL DIMENSION SYSTOLIC INDEX: 1.57 CM/M2
ECHO LV INTERNAL DIMENSION SYSTOLIC: 3.9 CM
ECHO LV IVSD: 1.1 CM (ref 0.6–1)
ECHO LV MASS 2D: 288.7 G (ref 88–224)
ECHO LV MASS INDEX 2D: 115.9 G/M2 (ref 49–115)
ECHO LV POSTERIOR WALL DIASTOLIC: 1.3 CM (ref 0.6–1)
ECHO LV RELATIVE WALL THICKNESS RATIO: 0.46
ECHO LVOT AREA: 3.8 CM2
ECHO LVOT AV VTI INDEX: 0.47
ECHO LVOT DIAM: 2.2 CM
ECHO LVOT MEAN GRADIENT: 1 MMHG
ECHO LVOT PEAK GRADIENT: 2 MMHG
ECHO LVOT PEAK VELOCITY: 0.6 M/S
ECHO LVOT STROKE VOLUME INDEX: 17.2 ML/M2
ECHO LVOT SV: 42.9 ML
ECHO LVOT VTI: 11.3 CM
ECHO MV E VELOCITY: 0.77 M/S
ECHO MV E/E' LATERAL: 6.42
ECHO MV E/E' RATIO (AVERAGED): 6.71
ECHO MV E/E' SEPTAL: 7
ECHO PV MAX VELOCITY: 0.8 M/S
ECHO PV PEAK GRADIENT: 3 MMHG
ECHO RA MINOR AXIS INDEX: 1.81 CM/M2
ECHO RA MINOR AXIS: 4.5 CM
ECHO RV INTERNAL DIMENSION: 4.6 CM
ECHO RV TAPSE: 1.9 CM (ref 1.7–?)
ECHO TV REGURGITANT MAX VELOCITY: 2.18 M/S
ECHO TV REGURGITANT PEAK GRADIENT: 19 MMHG

## 2023-05-05 RX ORDER — DIGOXIN 250 MCG
0.25 TABLET ORAL DAILY
Qty: 90 TABLET | Refills: 1 | Status: SHIPPED | OUTPATIENT
Start: 2023-05-05

## 2023-05-08 ENCOUNTER — TELEPHONE (OUTPATIENT)
Age: 58
End: 2023-05-08

## 2023-05-08 NOTE — TELEPHONE ENCOUNTER
Verified patient with two types of identifiers. Notified of results. Will discuss in more detail at upcoming appointment with Clarke Falcon NP. Patient verbalized understanding and will call with any other questions.       Future Appointments   Date Time Provider Helena Shaw   5/22/2023  3:00 PM AVERY Chase - DARREL VILLALOBOS AMB

## 2023-05-08 NOTE — TELEPHONE ENCOUNTER
Per Dr. Shirlene Cole:    \"Still AFIB RVR and LVEF 42%  Not sure he is agreeable to biventricular pacer and av node ablation or AF ablation\"    Attempted to reach patient by telephone. A message was left for return call.

## 2023-05-09 LAB — HBA1C MFR BLD HPLC: 6.5 %

## 2023-05-10 ENCOUNTER — CLINICAL DOCUMENTATION (OUTPATIENT)
Age: 58
End: 2023-05-10

## 2023-05-10 NOTE — PROGRESS NOTES
Received labs dated 05/04/2023.     WBC 8.4  Hgb 14  Hct 42.4  Plt 274    Glu 98  Na 142  K 4.8  Tbili 0.7  AST 13  ALT 15  Cr 0.96    Tchol 147  Tri 112  LDL 83    Hgb A1c 6.5  TSH 2.05  Free T4 1.33

## 2023-05-18 RX ORDER — METOPROLOL SUCCINATE 50 MG/1
1 TABLET, EXTENDED RELEASE ORAL 2 TIMES DAILY
COMMUNITY
Start: 2023-02-09

## 2023-05-18 RX ORDER — FUROSEMIDE 40 MG/1
1 TABLET ORAL DAILY
COMMUNITY
Start: 2023-01-06 | End: 2023-06-28

## 2023-05-18 RX ORDER — ATORVASTATIN CALCIUM 10 MG/1
10 TABLET, FILM COATED ORAL DAILY
COMMUNITY
Start: 2018-08-19

## 2023-05-18 RX ORDER — DAPAGLIFLOZIN AND METFORMIN HYDROCHLORIDE 5; 1000 MG/1; MG/1
1 TABLET, FILM COATED, EXTENDED RELEASE ORAL 2 TIMES DAILY
COMMUNITY
Start: 2022-03-21

## 2023-05-18 RX ORDER — FERROUS SULFATE 325(65) MG
TABLET ORAL
COMMUNITY

## 2023-05-18 RX ORDER — LOSARTAN POTASSIUM 25 MG/1
1 TABLET ORAL DAILY
COMMUNITY
Start: 2023-02-09 | End: 2023-05-22

## 2023-05-18 RX ORDER — METHIMAZOLE 5 MG/1
5 TABLET ORAL
COMMUNITY
Start: 2022-03-11

## 2023-05-18 RX ORDER — DIGOXIN 250 MCG
0.25 TABLET ORAL DAILY
COMMUNITY
Start: 2023-05-05

## 2023-05-18 RX ORDER — ALLOPURINOL 100 MG/1
1 TABLET ORAL DAILY
COMMUNITY
Start: 2023-02-09

## 2023-05-22 ENCOUNTER — OFFICE VISIT (OUTPATIENT)
Age: 58
End: 2023-05-22
Payer: COMMERCIAL

## 2023-05-22 VITALS
DIASTOLIC BLOOD PRESSURE: 60 MMHG | WEIGHT: 291.8 LBS | OXYGEN SATURATION: 96 % | BODY MASS INDEX: 43.22 KG/M2 | HEART RATE: 92 BPM | SYSTOLIC BLOOD PRESSURE: 110 MMHG | HEIGHT: 69 IN

## 2023-05-22 DIAGNOSIS — I10 PRIMARY HYPERTENSION: ICD-10-CM

## 2023-05-22 DIAGNOSIS — I42.8 NICM (NONISCHEMIC CARDIOMYOPATHY) (HCC): ICD-10-CM

## 2023-05-22 DIAGNOSIS — I50.22 CHRONIC SYSTOLIC CHF (CONGESTIVE HEART FAILURE) (HCC): ICD-10-CM

## 2023-05-22 DIAGNOSIS — Z79.01 ANTICOAGULATED: ICD-10-CM

## 2023-05-22 DIAGNOSIS — E66.01 MORBID OBESITY (HCC): ICD-10-CM

## 2023-05-22 DIAGNOSIS — I48.19 PERSISTENT ATRIAL FIBRILLATION (HCC): Primary | ICD-10-CM

## 2023-05-22 PROCEDURE — 3074F SYST BP LT 130 MM HG: CPT | Performed by: NURSE PRACTITIONER

## 2023-05-22 PROCEDURE — 99214 OFFICE O/P EST MOD 30 MIN: CPT | Performed by: NURSE PRACTITIONER

## 2023-05-22 PROCEDURE — 3078F DIAST BP <80 MM HG: CPT | Performed by: NURSE PRACTITIONER

## 2023-05-22 RX ORDER — LOSARTAN POTASSIUM 25 MG/1
12.5 TABLET ORAL DAILY
Qty: 45 TABLET | Refills: 1 | Status: SHIPPED | OUTPATIENT
Start: 2023-05-22

## 2023-05-22 ASSESSMENT — PATIENT HEALTH QUESTIONNAIRE - PHQ9
2. FEELING DOWN, DEPRESSED OR HOPELESS: 0
SUM OF ALL RESPONSES TO PHQ QUESTIONS 1-9: 0
SUM OF ALL RESPONSES TO PHQ QUESTIONS 1-9: 0
SUM OF ALL RESPONSES TO PHQ9 QUESTIONS 1 & 2: 0
SUM OF ALL RESPONSES TO PHQ QUESTIONS 1-9: 0
SUM OF ALL RESPONSES TO PHQ QUESTIONS 1-9: 0
1. LITTLE INTEREST OR PLEASURE IN DOING THINGS: 0

## 2023-05-23 ENCOUNTER — TELEPHONE (OUTPATIENT)
Age: 58
End: 2023-05-23

## 2023-05-23 NOTE — TELEPHONE ENCOUNTER
Lien Duran NP's office note  to Dr. Abad Sharma at fax number [phone]. Fax confirmation received.

## 2023-06-28 RX ORDER — FUROSEMIDE 40 MG/1
TABLET ORAL
Qty: 90 TABLET | Refills: 1 | Status: SHIPPED | OUTPATIENT
Start: 2023-06-28

## 2023-07-24 RX ORDER — RIVAROXABAN 20 MG/1
TABLET, FILM COATED ORAL
Qty: 90 TABLET | Refills: 1 | Status: SHIPPED | OUTPATIENT
Start: 2023-07-24

## 2023-07-24 RX ORDER — ALLOPURINOL 100 MG/1
TABLET ORAL
Qty: 90 TABLET | Refills: 1 | Status: SHIPPED | OUTPATIENT
Start: 2023-07-24

## 2023-07-24 RX ORDER — METOPROLOL SUCCINATE 50 MG/1
TABLET, EXTENDED RELEASE ORAL
Qty: 180 TABLET | Refills: 3 | Status: SHIPPED | OUTPATIENT
Start: 2023-07-24

## 2023-07-24 NOTE — TELEPHONE ENCOUNTER
Per VO of MD    LOV: 5/22/2023     Future Appointments   Date Time Provider [address]   9/1/2023  1:00 PM MD KO Dominguez BS AMB   11/30/2023  8:00 AM MD BRENDA Dominguez BS AMB       Requested Prescriptions     Signed Prescriptions Disp Refills    metoprolol succinate (TOPROL XL) 50 MG extended release tablet 180 tablet 3     Sig: Take 1 tablet by mouth twice daily     Authorizing Provider: Ebony Young     Ordering User: Olga Allen

## 2023-07-24 NOTE — TELEPHONE ENCOUNTER
Request for Allopurinol 100 mg daily and Xarelto 20 mg daily. Last office visit 5/22/23, next office visit 11/30/23. Refills per verbal order from Dr. Carol Wiseman.

## 2023-08-23 LAB — HBA1C MFR BLD HPLC: 6.5 %

## 2023-08-29 SDOH — HEALTH STABILITY: PHYSICAL HEALTH: ON AVERAGE, HOW MANY MINUTES DO YOU ENGAGE IN EXERCISE AT THIS LEVEL?: 0 MIN

## 2023-08-29 SDOH — HEALTH STABILITY: PHYSICAL HEALTH: ON AVERAGE, HOW MANY DAYS PER WEEK DO YOU ENGAGE IN MODERATE TO STRENUOUS EXERCISE (LIKE A BRISK WALK)?: 0 DAYS

## 2023-08-29 ASSESSMENT — SOCIAL DETERMINANTS OF HEALTH (SDOH)
WITHIN THE LAST YEAR, HAVE TO BEEN RAPED OR FORCED TO HAVE ANY KIND OF SEXUAL ACTIVITY BY YOUR PARTNER OR EX-PARTNER?: NO
WITHIN THE LAST YEAR, HAVE YOU BEEN HUMILIATED OR EMOTIONALLY ABUSED IN OTHER WAYS BY YOUR PARTNER OR EX-PARTNER?: NO
WITHIN THE LAST YEAR, HAVE YOU BEEN KICKED, HIT, SLAPPED, OR OTHERWISE PHYSICALLY HURT BY YOUR PARTNER OR EX-PARTNER?: NO
WITHIN THE LAST YEAR, HAVE YOU BEEN AFRAID OF YOUR PARTNER OR EX-PARTNER?: NO

## 2023-09-01 ENCOUNTER — OFFICE VISIT (OUTPATIENT)
Age: 58
End: 2023-09-01
Payer: COMMERCIAL

## 2023-09-01 VITALS
HEIGHT: 69 IN | TEMPERATURE: 97 F | BODY MASS INDEX: 43.1 KG/M2 | HEART RATE: 77 BPM | SYSTOLIC BLOOD PRESSURE: 111 MMHG | WEIGHT: 291 LBS | OXYGEN SATURATION: 97 % | DIASTOLIC BLOOD PRESSURE: 67 MMHG | RESPIRATION RATE: 20 BRPM

## 2023-09-01 DIAGNOSIS — K43.9 VENTRAL HERNIA WITHOUT OBSTRUCTION OR GANGRENE: ICD-10-CM

## 2023-09-01 DIAGNOSIS — I48.91 ATRIAL FIBRILLATION, UNSPECIFIED TYPE (HCC): ICD-10-CM

## 2023-09-01 DIAGNOSIS — Z11.4 ENCOUNTER FOR SCREENING FOR HIV: ICD-10-CM

## 2023-09-01 DIAGNOSIS — I42.8 NICM (NONISCHEMIC CARDIOMYOPATHY) (HCC): ICD-10-CM

## 2023-09-01 DIAGNOSIS — Z11.59 ENCOUNTER FOR HEPATITIS C SCREENING TEST FOR LOW RISK PATIENT: ICD-10-CM

## 2023-09-01 DIAGNOSIS — Z86.39 HISTORY OF IRON DEFICIENCY: ICD-10-CM

## 2023-09-01 DIAGNOSIS — Z00.00 ROUTINE GENERAL MEDICAL EXAMINATION AT A HEALTH CARE FACILITY: Primary | ICD-10-CM

## 2023-09-01 DIAGNOSIS — M10.9 GOUT, UNSPECIFIED CAUSE, UNSPECIFIED CHRONICITY, UNSPECIFIED SITE: ICD-10-CM

## 2023-09-01 DIAGNOSIS — E66.01 CLASS 3 SEVERE OBESITY DUE TO EXCESS CALORIES WITH SERIOUS COMORBIDITY IN ADULT, UNSPECIFIED BMI (HCC): ICD-10-CM

## 2023-09-01 DIAGNOSIS — Z23 NEED FOR TETANUS, DIPHTHERIA, AND ACELLULAR PERTUSSIS (TDAP) VACCINE: ICD-10-CM

## 2023-09-01 DIAGNOSIS — E05.90 HYPERTHYROIDISM: ICD-10-CM

## 2023-09-01 DIAGNOSIS — E11.9 TYPE 2 DIABETES MELLITUS WITHOUT COMPLICATION, WITHOUT LONG-TERM CURRENT USE OF INSULIN (HCC): ICD-10-CM

## 2023-09-01 PROCEDURE — 99386 PREV VISIT NEW AGE 40-64: CPT | Performed by: STUDENT IN AN ORGANIZED HEALTH CARE EDUCATION/TRAINING PROGRAM

## 2023-09-01 PROCEDURE — 90471 IMMUNIZATION ADMIN: CPT | Performed by: STUDENT IN AN ORGANIZED HEALTH CARE EDUCATION/TRAINING PROGRAM

## 2023-09-01 PROCEDURE — 90715 TDAP VACCINE 7 YRS/> IM: CPT | Performed by: STUDENT IN AN ORGANIZED HEALTH CARE EDUCATION/TRAINING PROGRAM

## 2023-09-01 SDOH — ECONOMIC STABILITY: FOOD INSECURITY: WITHIN THE PAST 12 MONTHS, THE FOOD YOU BOUGHT JUST DIDN'T LAST AND YOU DIDN'T HAVE MONEY TO GET MORE.: NEVER TRUE

## 2023-09-01 SDOH — ECONOMIC STABILITY: INCOME INSECURITY: HOW HARD IS IT FOR YOU TO PAY FOR THE VERY BASICS LIKE FOOD, HOUSING, MEDICAL CARE, AND HEATING?: NOT HARD AT ALL

## 2023-09-01 SDOH — ECONOMIC STABILITY: HOUSING INSECURITY
IN THE LAST 12 MONTHS, WAS THERE A TIME WHEN YOU DID NOT HAVE A STEADY PLACE TO SLEEP OR SLEPT IN A SHELTER (INCLUDING NOW)?: NO

## 2023-09-01 SDOH — ECONOMIC STABILITY: FOOD INSECURITY: WITHIN THE PAST 12 MONTHS, YOU WORRIED THAT YOUR FOOD WOULD RUN OUT BEFORE YOU GOT MONEY TO BUY MORE.: NEVER TRUE

## 2023-09-01 ASSESSMENT — PATIENT HEALTH QUESTIONNAIRE - PHQ9
SUM OF ALL RESPONSES TO PHQ QUESTIONS 1-9: 0
SUM OF ALL RESPONSES TO PHQ QUESTIONS 1-9: 0
1. LITTLE INTEREST OR PLEASURE IN DOING THINGS: 0
SUM OF ALL RESPONSES TO PHQ QUESTIONS 1-9: 0
SUM OF ALL RESPONSES TO PHQ9 QUESTIONS 1 & 2: 0
SUM OF ALL RESPONSES TO PHQ QUESTIONS 1-9: 0
2. FEELING DOWN, DEPRESSED OR HOPELESS: 0

## 2023-09-01 NOTE — PROGRESS NOTES
Eloy Madden is a 65y.o. year old male who is a new patient to me today (09/04/23). He was previous followed by Dr Sergio Marino, last seen 11/2021. Assessment & Plan:   1. Routine general medical examination at a health care facility  Reviewed diet and exercise habits - see obesity. Updated health maintenance, see below. Check screening labs - reviewed CMP, A1c, Lipid from his endocrinologist earlier this month  2. Class 3 severe obesity due to excess calories with serious comorbidity in adult, unspecified BMI ([address])  Assessment & Plan:  He is been loosing weight since changing his diet. Encouraged to continue and possibly add in exercise if he is able. 3. Type 2 diabetes mellitus without complication, without long-term current use of insulin ([address])  Assessment & Plan:  Continue to follow with endocrinology. His A1c is controlled. He is on metformin-dapagliflozin, ozempic, and atorvastatin. 4. Hyperthyroidism  Assessment & Plan:  Continue to follow with endocrinology for management. 5. Gout, unspecified cause, unspecified chronicity, unspecified site  Assessment & Plan:   Check uric acid level. He is taking allopurinol ppx. Orders:  -     Uric Acid; Future  6. Atrial fibrillation, unspecified type [facility])  Assessment & Plan:  Continue to follow with Dr Nils Rudd. Taking metoprolol, digoxin, xarelto   7. NICM (nonischemic cardiomyopathy) [facility])  Assessment & Plan:  Continue to follow with Dr Nils Rudd. Taking lasix, losartan, metoprolol   8. Ventral hernia without obstruction or gangrene  Comments:  refer to gen surgery for consideration of repair  Orders:  -     Jennifer Delvalle MD, General Surgery, [place] ([address])  9. History of iron deficiency  Comments:  will check CBC and possibly add on iron panel if necessary. he has been taking iron pill for a while. 10. Encounter for screening for HIV  -     HIV 1/2 Ag/Ab, 4TH Generation,W Rflx Confirm; Future  11.  Encounter for hepatitis C screening test for

## 2023-09-01 NOTE — PATIENT INSTRUCTIONS
Please obtain the following vaccines from your local pharmacy. Please ask your pharmacy to fax our office a copy of the vaccination record.  Our fax number is [phone].   - Prevnar 20 - pneumonia vaccine

## 2023-09-04 PROBLEM — Z01.89 ENCOUNTER FOR CARDIOVERSION PROCEDURE: Status: RESOLVED | Noted: 2018-11-30 | Resolved: 2023-09-04

## 2023-09-04 PROBLEM — I42.8 NICM (NONISCHEMIC CARDIOMYOPATHY) (HCC): Status: ACTIVE | Noted: 2023-09-04

## 2023-09-04 PROBLEM — E05.90 HYPERTHYROIDISM: Status: ACTIVE | Noted: 2023-09-04

## 2023-09-04 PROBLEM — R07.9 ACUTE CHEST PAIN: Status: RESOLVED | Noted: 2017-07-01 | Resolved: 2023-09-04

## 2023-09-04 PROBLEM — E11.9 TYPE 2 DIABETES MELLITUS WITHOUT COMPLICATION, WITHOUT LONG-TERM CURRENT USE OF INSULIN (HCC): Status: ACTIVE | Noted: 2023-09-04

## 2023-09-04 PROBLEM — R60.1 ANASARCA: Status: RESOLVED | Noted: 2017-07-01 | Resolved: 2023-09-04

## 2023-09-04 PROBLEM — E66.813 CLASS 3 SEVERE OBESITY DUE TO EXCESS CALORIES WITH SERIOUS COMORBIDITY IN ADULT: Status: ACTIVE | Noted: 2018-08-30

## 2023-09-04 PROBLEM — E66.01 CLASS 3 SEVERE OBESITY DUE TO EXCESS CALORIES WITH SERIOUS COMORBIDITY IN ADULT (HCC): Status: ACTIVE | Noted: 2018-08-30

## 2023-09-04 NOTE — ASSESSMENT & PLAN NOTE
He is been loosing weight since changing his diet. Encouraged to continue and possibly add in exercise if he is able.

## 2023-09-04 NOTE — ASSESSMENT & PLAN NOTE
Continue to follow with endocrinology. His A1c is controlled. He is on metformin-dapagliflozin, ozempic, and atorvastatin.

## 2023-09-15 ENCOUNTER — TELEPHONE (OUTPATIENT)
Age: 58
End: 2023-09-15

## 2023-09-15 NOTE — TELEPHONE ENCOUNTER
Contacted patient to regarding referral. Patient stated that he does not want to schedule at this time as he is waiting for a billing issue with [address] to be fixed. Patient requested for me to close the referral and if and when things are figured out, he will call to get an appointment scheduled for the hernia. Patient thanked me for the call.

## 2023-09-19 DIAGNOSIS — I10 PRIMARY HYPERTENSION: ICD-10-CM

## 2023-09-19 RX ORDER — LOSARTAN POTASSIUM 25 MG/1
12.5 TABLET ORAL DAILY
Qty: 45 TABLET | Refills: 1 | Status: SHIPPED | OUTPATIENT
Start: 2023-09-19

## 2023-09-19 NOTE — TELEPHONE ENCOUNTER
Request for Losartan 12.5 mg daily. Last office visit 5/22/23, next office visit 11/30/23. Refills per verbal order from Dr. Greg Licea.

## 2023-10-16 RX ORDER — DIGOXIN 250 MCG
250 TABLET ORAL DAILY
Qty: 90 TABLET | Refills: 1 | Status: SHIPPED | OUTPATIENT
Start: 2023-10-16

## 2023-10-16 NOTE — TELEPHONE ENCOUNTER
Request for Digoxin 250 mcg daily. Last office visit 5/22/23, next office visit 11/30/23. Refills per verbal order from Dr. Génesis Clements.

## 2023-11-28 LAB
ERYTHROCYTE [DISTWIDTH] IN BLOOD BY AUTOMATED COUNT: 14.6 % (ref 11.6–15.4)
HCT VFR BLD AUTO: 42.4 % (ref 37.5–51)
HCV IGG SERPL QL IA: NON REACTIVE
HGB BLD-MCNC: 13.6 G/DL (ref 13–17.7)
HIV 1+2 AB+HIV1 P24 AG SERPL QL IA: NON REACTIVE
MCH RBC QN AUTO: 25.8 PG (ref 26.6–33)
MCHC RBC AUTO-ENTMCNC: 32.1 G/DL (ref 31.5–35.7)
MCV RBC AUTO: 80 FL (ref 79–97)
PLATELET # BLD AUTO: 272 X10E3/UL (ref 150–450)
RBC # BLD AUTO: 5.28 X10E6/UL (ref 4.14–5.8)
URATE SERPL-MCNC: 4.5 MG/DL (ref 3.8–8.4)
WBC # BLD AUTO: 8.3 X10E3/UL (ref 3.4–10.8)

## 2023-11-30 ENCOUNTER — OFFICE VISIT (OUTPATIENT)
Age: 58
End: 2023-11-30
Payer: COMMERCIAL

## 2023-11-30 VITALS
BODY MASS INDEX: 43.4 KG/M2 | OXYGEN SATURATION: 97 % | HEIGHT: 69 IN | DIASTOLIC BLOOD PRESSURE: 62 MMHG | WEIGHT: 293 LBS | HEART RATE: 90 BPM | SYSTOLIC BLOOD PRESSURE: 100 MMHG

## 2023-11-30 DIAGNOSIS — Z79.01 ANTICOAGULATED: ICD-10-CM

## 2023-11-30 DIAGNOSIS — I48.19 PERSISTENT ATRIAL FIBRILLATION (HCC): ICD-10-CM

## 2023-11-30 DIAGNOSIS — I48.19 PERSISTENT ATRIAL FIBRILLATION (HCC): Primary | ICD-10-CM

## 2023-11-30 DIAGNOSIS — E66.01 MORBID (SEVERE) OBESITY DUE TO EXCESS CALORIES (HCC): ICD-10-CM

## 2023-11-30 DIAGNOSIS — I42.8 NICM (NONISCHEMIC CARDIOMYOPATHY) (HCC): ICD-10-CM

## 2023-11-30 DIAGNOSIS — I50.22 CHRONIC SYSTOLIC CHF (CONGESTIVE HEART FAILURE) (HCC): ICD-10-CM

## 2023-11-30 PROCEDURE — 99214 OFFICE O/P EST MOD 30 MIN: CPT | Performed by: INTERNAL MEDICINE

## 2023-11-30 ASSESSMENT — PATIENT HEALTH QUESTIONNAIRE - PHQ9
2. FEELING DOWN, DEPRESSED OR HOPELESS: 0
SUM OF ALL RESPONSES TO PHQ QUESTIONS 1-9: 0
SUM OF ALL RESPONSES TO PHQ9 QUESTIONS 1 & 2: 0
SUM OF ALL RESPONSES TO PHQ QUESTIONS 1-9: 0
1. LITTLE INTEREST OR PLEASURE IN DOING THINGS: 0

## 2023-11-30 NOTE — PROGRESS NOTES
Constitutional:  Well-nourished. No distress. Head: Normocephalic and atraumatic. Eyes: Pupils are equal, round. Neck: Supple. No JVD present. Cardiovascular: controlled rate, irregular rhythm. Exam reveals no gallop and no friction rub. No murmur heard. Pulmonary/Chest: Effort normal and breath sounds normal. No wheezes. Abdominal: Soft, morbidly obese. Musculoskeletal: Moves extremities independently. Normal gait. Vasc/lymphatic: Pretibial bilateral edema. Neurological: Alert, oriented. Skin: Skin is warm and dry. Venous stasis changes bilateral lower extremities. Psychiatric: Normal mood and affect. Behavior is normal. Judgment and thought content normal.         Assessment/Plan:     Imaging/Studies:   Echo (07/28/2020): LVEF 40%, mildly dilated LV, mod concentric LVH. Mildly dilated LA. MELVIN (12/16/2019): LVEF 20-25%. Severely HAIDER. Mild MR. Mild TR. Diagnosis Orders   1. Persistent atrial fibrillation (HCC)  Digoxin Level    2D ECHO COMPLETE ADULT (TTE) W OR WO CONTR- Clinic Performed      2. Morbid (severe) obesity due to excess calories (HCC)  2D ECHO COMPLETE ADULT (TTE) W OR WO CONTR- Clinic Performed      3. NICM (nonischemic cardiomyopathy) ([address])  2D ECHO COMPLETE ADULT (TTE) W OR WO CONTR- Clinic Performed      4. Anticoagulated  2D ECHO COMPLETE ADULT (TTE) W OR WO CONTR- Clinic Performed      5. Chronic systolic CHF (congestive heart failure) (HCC)  2D ECHO COMPLETE ADULT (TTE) W OR WO CONTR- Clinic Performed         Persistent AF/typical AFL:   He is feeling better, failed multiple AADs. Rate today is relatively controlled, unable to increase Toprol XL due to low BP   He said he increased losartan to 25 mg qd  He want to continue  Will check digoxin level  He has declined endocardial & epicardial hybrid ablation previously.   With severe HAIDER on prior MELVIN, likely unable to maintain NSR post ablation now  Therefore I discussed possible AV node ablation &

## 2024-01-05 RX ORDER — ALLOPURINOL 100 MG/1
TABLET ORAL
Qty: 90 TABLET | Refills: 1 | Status: SHIPPED | OUTPATIENT
Start: 2024-01-05

## 2024-01-05 NOTE — TELEPHONE ENCOUNTER
Request for ALLOPURINOL 100 MG.  Last office visit 11/30/23, next office visit 11/26/24. Refills per verbal order from Dr. Deras.

## 2024-01-08 RX ORDER — FUROSEMIDE 40 MG/1
TABLET ORAL
Qty: 90 TABLET | Refills: 1 | Status: SHIPPED | OUTPATIENT
Start: 2024-01-08

## 2024-01-08 NOTE — TELEPHONE ENCOUNTER
VO per MD    Future Appointments   Date Time Provider Department Center   5/6/2024  1:00 PM [facility]   11/26/2024  8:20 AM Benjamin Deras MD [facility]

## 2024-02-03 DIAGNOSIS — I10 PRIMARY HYPERTENSION: ICD-10-CM

## 2024-02-05 RX ORDER — LOSARTAN POTASSIUM 25 MG/1
25 TABLET ORAL DAILY
Qty: 90 TABLET | Refills: 1 | Status: SHIPPED | OUTPATIENT
Start: 2024-02-05

## 2024-02-05 NOTE — TELEPHONE ENCOUNTER
VO per NP    Future Appointments   Date Time Provider Department Center   5/6/2024  1:00 PM [facility]   11/26/2024  8:20 AM Benjamin Deras MD [facility]

## 2024-02-13 RX ORDER — RIVAROXABAN 20 MG/1
TABLET, FILM COATED ORAL
Qty: 90 TABLET | Refills: 1 | Status: SHIPPED | OUTPATIENT
Start: 2024-02-13

## 2024-02-13 NOTE — TELEPHONE ENCOUNTER
Requested Prescriptions     Pending Prescriptions Disp Refills    XARELTO 20 MG TABS tablet [Pharmacy Med Name: Xarelto 20 MG Oral Tablet] 90 tablet 1     Sig: TAKE 1 TABLET BY MOUTH ONCE DAILY WITH SUPPER IN  PLACE  OF  ELIQUIS       Med refilled per VO by MD.    Future Appointments   Date Time Provider Department Center   5/6/2024  1:00 PM [facility]   11/26/2024  8:20 AM Benjamin Deras MD [facility]

## 2024-02-21 ENCOUNTER — OFFICE VISIT (OUTPATIENT)
Age: 59
End: 2024-02-21
Payer: COMMERCIAL

## 2024-02-21 VITALS
OXYGEN SATURATION: 95 % | TEMPERATURE: 97.8 F | HEART RATE: 97 BPM | SYSTOLIC BLOOD PRESSURE: 121 MMHG | WEIGHT: 287.4 LBS | BODY MASS INDEX: 42.57 KG/M2 | HEIGHT: 69 IN | RESPIRATION RATE: 14 BRPM | DIASTOLIC BLOOD PRESSURE: 79 MMHG

## 2024-02-21 DIAGNOSIS — R14.0 ABDOMINAL BLOATING: Primary | ICD-10-CM

## 2024-02-21 PROCEDURE — 99203 OFFICE O/P NEW LOW 30 MIN: CPT | Performed by: SURGERY

## 2024-02-21 ASSESSMENT — PATIENT HEALTH QUESTIONNAIRE - PHQ9
SUM OF ALL RESPONSES TO PHQ QUESTIONS 1-9: 0
SUM OF ALL RESPONSES TO PHQ9 QUESTIONS 1 & 2: 0
SUM OF ALL RESPONSES TO PHQ QUESTIONS 1-9: 0
1. LITTLE INTEREST OR PLEASURE IN DOING THINGS: 0
SUM OF ALL RESPONSES TO PHQ QUESTIONS 1-9: 0
2. FEELING DOWN, DEPRESSED OR HOPELESS: 0
SUM OF ALL RESPONSES TO PHQ QUESTIONS 1-9: 0

## 2024-02-21 NOTE — PROGRESS NOTES
Identified patient with two patient identifiers (name and ). Reviewed chart in preparation for visit and have obtained necessary documentation.    Esau Rust is a 58 y.o. male  Chief Complaint   Patient presents with    New Patient    Hernia     /79 (Site: Right Upper Arm, Position: Sitting, Cuff Size: Large Adult)   Pulse 97   Temp 97.8 °F (36.6 °C) (Oral)   Resp 14   Ht 1.753 m (5' 9\")   Wt 130.4 kg (287 lb 6.4 oz)   SpO2 95%   BMI 42.44 kg/m²     1. Have you been to the ER, urgent care clinic since your last visit?  Hospitalized since your last visit?no    2. Have you seen or consulted any other health care providers outside of the [facility] since your last visit?  Include any pap smears or colon screening. Yes Endocrinologist

## 2024-02-21 NOTE — PROGRESS NOTES
Surgery History and Physical    Subjective:      Esau Rust  is a 58 y.o.   male who presents with several weeks of progressive abdominal bloating associated with some nausea.  After eating he feels bloated and when it resolves, is associated with almost watery diarrhea. No fever or chills.  Not marked abdominal pain, but has some when bloated.  Does not radiate to his back or flank.  He has lost 60 pounds since starting Ozempic, and wonders of that drug might be responsible.  ..    Past Medical History:   Diagnosis Date    Abdominal bloating 02/21/2024    Arrhythmia     Sleep apnea        Past Surgical History:   Procedure Laterality Date    CARDIOVERSION ELECTIVE ARRHYTHMIA EXTERNAL  7/3/2017         CARDIOVERSION ELECTIVE ARRHYTHMIA EXTERNAL  11/30/2018            Social History     Tobacco Use    Smoking status: Never    Smokeless tobacco: Former     Types: Snuff     Quit date: 2020   Substance Use Topics    Alcohol use: Not Currently       Family History   Problem Relation Age of Onset    Stroke Mother     Heart Disease Father 68    Diabetes Sister     Colon Cancer Neg Hx     Prostate Cancer Neg Hx        Current Outpatient Medications on File Prior to Visit   Medication Sig Dispense Refill    rivaroxaban (XARELTO) 20 MG TABS tablet TAKE 1 TABLET BY MOUTH ONCE DAILY WITH SUPPER IN  PLACE  OF  ELIQUIS 90 tablet 1    losartan (COZAAR) 25 MG tablet Take 1 tablet by mouth daily 90 tablet 1    furosemide (LASIX) 40 MG tablet Take 1 tablet by mouth once daily 90 tablet 1    allopurinol (ZYLOPRIM) 100 MG tablet Take 1 tablet by mouth once daily 90 tablet 1    digoxin (LANOXIN) 250 MCG tablet Take 1 tablet by mouth once daily 90 tablet 1    metoprolol succinate (TOPROL XL) 50 MG extended release tablet Take 1 tablet by mouth twice daily 180 tablet 3    atorvastatin (LIPITOR) 10 MG tablet Take 1 tablet by mouth daily      Cholecalciferol 50 MCG (2000 UT) TABS Take 1 tablet by mouth daily

## 2024-02-24 LAB — DIGOXIN SERPL-MCNC: 0.6 NG/ML (ref 0.5–0.9)

## 2024-02-26 ENCOUNTER — TELEPHONE (OUTPATIENT)
Age: 59
End: 2024-02-26

## 2024-02-26 NOTE — TELEPHONE ENCOUNTER
----- Message from AVERY Swain NP sent at 2/24/2024  4:07 PM EST -----  Digoxin level acceptable to continue.

## 2024-03-04 ENCOUNTER — CLINICAL DOCUMENTATION (OUTPATIENT)
Age: 59
End: 2024-03-04

## 2024-03-07 ENCOUNTER — TELEPHONE (OUTPATIENT)
Age: 59
End: 2024-03-07

## 2024-03-07 DIAGNOSIS — R14.0 ABDOMINAL BLOATING: Primary | ICD-10-CM

## 2024-03-07 NOTE — TELEPHONE ENCOUNTER
Returned call to patient.  Two patient identifiers used. Patient stated he was giving a procedure code from central scheduling which was 74527 and he stated he called his insurance to make sure this would be approve. I made patient aware the provider completed the peer to peer and it was approve. Patient stated the code that the insurance approved was 58733. He stated the difference between the codes are the contrast, the one the insurance approved was with out, and the code [facility] used is with contrast. Patient would like more clarification on this, I made patient aware I will discuss with provider, and will follow up in the morning. Patient is currently scheduled for tomorrow afternoon.

## 2024-03-07 NOTE — TELEPHONE ENCOUNTER
Patient called needing clarification  on the CPT code for a test ,his insurance believes its another code and he needs the correct ehather for insurance

## 2024-03-07 NOTE — TELEPHONE ENCOUNTER
Peer to Peer has been completed and approve for CT. Will call central scheduling to call patient to schedule.     # [id number]    I called central scheduling and spoke to Inés, I made her aware a peer to peer needed to be done for CT, and oit was approved, and asked if she could call the patient to schedule appt. Inés verbalized understanding and thanked for call.

## 2024-03-08 ENCOUNTER — HOSPITAL ENCOUNTER (OUTPATIENT)
Facility: HOSPITAL | Age: 59
Discharge: HOME | End: 2024-03-08
Attending: SURGERY
Payer: COMMERCIAL

## 2024-03-08 DIAGNOSIS — R14.0 ABDOMINAL BLOATING: ICD-10-CM

## 2024-03-08 PROCEDURE — 6360000004 HC RX CONTRAST MEDICATION: Performed by: SURGERY

## 2024-03-08 PROCEDURE — 74177 CT ABD & PELVIS W/CONTRAST: CPT

## 2024-03-08 RX ADMIN — IOPAMIDOL 100 ML: 755 INJECTION, SOLUTION INTRAVENOUS at 13:12

## 2024-03-08 NOTE — TELEPHONE ENCOUNTER
I called central scheduling and spoke to Darling I asked for the procedure code from the new order that was placed from yesterday Darling stated the procedure code was 92543. I will make the patient aware.

## 2024-03-08 NOTE — TELEPHONE ENCOUNTER
Returned call to patient.  Two patient identifiers used. I patient aware the new order was placed yesterday evening, and the procedure codes matches with the insurances , patient verbalized understanding and stated he received a call from Dr. Webb yesterday telling him he was good to go. Patient thanked for following up on the matter.

## 2024-03-18 ENCOUNTER — TELEPHONE (OUTPATIENT)
Age: 59
End: 2024-03-18

## 2024-03-18 NOTE — TELEPHONE ENCOUNTER
Returned call to patient.  Two patient identifiers used. I made patient aware his wife called stating he has not heard back from Dr. Estrada regarding the CT results, patient stated if his wife called he did not tell her to, she did that on her own. I made patient aware Dr. Estrada would be out the office until some time next week, but I can have one his colleagues to call to go over the CT scan with him if he would like, patient stated he feels it is not urgent, he stated he looked over the results in his MyChart and didn't see any red flags, he stated he could wait until Dr. Estrada return to the office. Patient thanked for the call.

## 2024-03-18 NOTE — TELEPHONE ENCOUNTER
Patient's wife called asking about the CT results, stating the patient was supposed to get a phone call but has not yet. Callback # is the patient: [phone]

## 2024-04-02 RX ORDER — DIGOXIN 250 MCG
250 TABLET ORAL DAILY
Qty: 90 TABLET | Refills: 1 | Status: SHIPPED | OUTPATIENT
Start: 2024-04-02

## 2024-04-02 NOTE — TELEPHONE ENCOUNTER
Request for digoxin 250 mcg.  Last office visit 11/30/23, next office visit 11/26/24. Refills per verbal order from Christa Lima NP.

## 2024-05-11 ENCOUNTER — CLINICAL DOCUMENTATION (OUTPATIENT)
Age: 59
End: 2024-05-11

## 2024-05-13 ENCOUNTER — TELEPHONE (OUTPATIENT)
Age: 59
End: 2024-05-13

## 2024-05-13 NOTE — TELEPHONE ENCOUNTER
----- Message from Benjamin Deras MD sent at 5/11/2024 10:33 PM EDT -----  Echo with LVEF 35% AFIB and mild RVR at times  He is dealing with hernia issue  I still recommend that he needs proper rate or rhythm control to improve LVEF with BIV ICD and av node ablation or AF ablation.  Medications are currently not enough  He has not wanted to change this over the years due to cost of procedure copayment  Cannot increase digoxin due to toxicity  Toprol is already 50 mg bid  May increase toprol to 50 mg tid temporarily but this may drop BP and he could become orthostatic

## 2024-06-01 LAB — HBA1C MFR BLD HPLC: 6.4 %

## 2024-06-27 RX ORDER — ALLOPURINOL 100 MG/1
TABLET ORAL
Qty: 90 TABLET | Refills: 1 | Status: SHIPPED | OUTPATIENT
Start: 2024-06-27

## 2024-06-27 RX ORDER — FUROSEMIDE 40 MG/1
TABLET ORAL
Qty: 90 TABLET | Refills: 1 | Status: SHIPPED | OUTPATIENT
Start: 2024-06-27

## 2024-06-27 NOTE — TELEPHONE ENCOUNTER
Request for ALLOPURINOL 100 MG, FUROSEMIDE 40 MG.  Last office visit 11/30/23, next office visit 11/26/24. Refills per verbal order from Dr. Deras.

## 2024-06-28 RX ORDER — METOPROLOL SUCCINATE 50 MG/1
TABLET, EXTENDED RELEASE ORAL
Qty: 180 TABLET | Refills: 1 | Status: SHIPPED | OUTPATIENT
Start: 2024-06-28

## 2024-06-28 NOTE — TELEPHONE ENCOUNTER
Med refilled per VO by MD.    Future Appointments   Date Time Provider Department Center   11/26/2024  8:20 AM Benjamin Deras MD [facility]

## 2024-06-30 ENCOUNTER — TELEPHONE (OUTPATIENT)
Age: 59
End: 2024-06-30

## 2024-07-08 RX ORDER — METOPROLOL SUCCINATE 50 MG/1
TABLET, EXTENDED RELEASE ORAL
Qty: 180 TABLET | Refills: 0 | OUTPATIENT
Start: 2024-07-08

## 2024-07-22 ENCOUNTER — PATIENT MESSAGE (OUTPATIENT)
Age: 59
End: 2024-07-22

## 2024-07-22 RX ORDER — RIVAROXABAN 20 MG/1
TABLET, FILM COATED ORAL
Qty: 90 TABLET | Refills: 1 | Status: SHIPPED | OUTPATIENT
Start: 2024-07-22

## 2024-07-22 NOTE — TELEPHONE ENCOUNTER
VO per NP    Future Appointments   Date Time Provider Department Center   11/26/2024  8:20 AM Benjamin Deras MD [facility]

## 2024-07-23 RX ORDER — METOPROLOL SUCCINATE 50 MG/1
50 TABLET, EXTENDED RELEASE ORAL 2 TIMES DAILY
Qty: 180 TABLET | Refills: 1 | Status: SHIPPED | OUTPATIENT
Start: 2024-07-23

## 2024-07-23 NOTE — TELEPHONE ENCOUNTER
From: Esau Rust  To: Dr. Benjamin Deras  Sent: 7/22/2024 6:18 PM EDT  Subject: Am I supposed to stop taking Metoprolol    Heljazmyn Deras,  My prescription for Metoprolol ER 50MG is almost out. My pharmacy claims that they have requested a refill but has not heard back from your office.  I was contacted by STEPHANE DAVID on June 28 who said that she would send the refill to my pharmacy, but they claim they did not get it.   Is it possible that she sent it to the wrong pharmacy?   Should I stop taking it?  Now I am getting near the end of the bottle.   Should I only take it once a day to stretch it out a few extra days?    Thanks for your help.  Donnell HAYWARD

## 2024-08-16 DIAGNOSIS — I10 PRIMARY HYPERTENSION: ICD-10-CM

## 2024-08-16 RX ORDER — LOSARTAN POTASSIUM 25 MG/1
25 TABLET ORAL DAILY
Qty: 90 TABLET | Refills: 2 | Status: SHIPPED | OUTPATIENT
Start: 2024-08-16

## 2024-09-23 RX ORDER — DIGOXIN 250 MCG
250 TABLET ORAL DAILY
Qty: 90 TABLET | Refills: 1 | Status: SHIPPED | OUTPATIENT
Start: 2024-09-23

## 2024-11-26 ENCOUNTER — OFFICE VISIT (OUTPATIENT)
Age: 59
End: 2024-11-26
Payer: COMMERCIAL

## 2024-11-26 VITALS
BODY MASS INDEX: 42.51 KG/M2 | HEART RATE: 66 BPM | WEIGHT: 287 LBS | HEIGHT: 69 IN | OXYGEN SATURATION: 99 % | SYSTOLIC BLOOD PRESSURE: 126 MMHG | DIASTOLIC BLOOD PRESSURE: 74 MMHG

## 2024-11-26 DIAGNOSIS — Z79.01 ANTICOAGULATED: ICD-10-CM

## 2024-11-26 DIAGNOSIS — I48.11 LONGSTANDING PERSISTENT ATRIAL FIBRILLATION (HCC): Primary | ICD-10-CM

## 2024-11-26 DIAGNOSIS — I10 PRIMARY HYPERTENSION: ICD-10-CM

## 2024-11-26 DIAGNOSIS — I42.8 NICM (NONISCHEMIC CARDIOMYOPATHY) (HCC): ICD-10-CM

## 2024-11-26 DIAGNOSIS — I48.3 TYPICAL ATRIAL FLUTTER (HCC): ICD-10-CM

## 2024-11-26 DIAGNOSIS — I50.22 CHRONIC SYSTOLIC (CONGESTIVE) HEART FAILURE (HCC): ICD-10-CM

## 2024-11-26 DIAGNOSIS — E66.01 MORBID (SEVERE) OBESITY DUE TO EXCESS CALORIES: ICD-10-CM

## 2024-11-26 PROCEDURE — 93000 ELECTROCARDIOGRAM COMPLETE: CPT | Performed by: INTERNAL MEDICINE

## 2024-11-26 PROCEDURE — 3074F SYST BP LT 130 MM HG: CPT | Performed by: INTERNAL MEDICINE

## 2024-11-26 PROCEDURE — 3078F DIAST BP <80 MM HG: CPT | Performed by: INTERNAL MEDICINE

## 2024-11-26 PROCEDURE — 99214 OFFICE O/P EST MOD 30 MIN: CPT | Performed by: INTERNAL MEDICINE

## 2024-11-26 ASSESSMENT — PATIENT HEALTH QUESTIONNAIRE - PHQ9
SUM OF ALL RESPONSES TO PHQ QUESTIONS 1-9: 0
1. LITTLE INTEREST OR PLEASURE IN DOING THINGS: NOT AT ALL
2. FEELING DOWN, DEPRESSED OR HOPELESS: NOT AT ALL
SUM OF ALL RESPONSES TO PHQ9 QUESTIONS 1 & 2: 0
SUM OF ALL RESPONSES TO PHQ QUESTIONS 1-9: 0

## 2024-11-26 NOTE — PROGRESS NOTES
[facility] Cardiology   Cardiac Electrophysiology Clinic Care Note                   [ ]Initial visit     [x]Established visit     Patient Name: Esau Rust - :1965 - MRN:[id number]   Primary Cardiologist: None   Electrophysiologist: Benjamin Deras MD     Reason for visit: Persistent AF follow up     HPI:   Mr. Rust is a 59 y.o. male who presents for follow up of persistent AF.     Previously declined ablation.  He's failed multiple AADs & has suboptimal rate control on current Toprol XL 50 mg po bid & digoxin 0.25 mg po daily.      He denies chest pain.     ECG today shows  bpm.     History of tachy induced CM.  Echo in 2024 showed LVEF 30-35% with mildly dilated LV, mild posterior LV thickening, & mildly dilated LA.  NYHA II.  On appropriate GDMT.     BP controlled.     Anticoagulated with Xarelto, denies bleeding issues.     States he's lost weight & DM is better controlled, followed by endocrinology.       Previous:   Failed Tikosyn & Multaq, then amiodarone.     S/p DCCV for AF 2018 & 2017, 2019.     Hospitalization 2017 for atrial fibrillation with RVR and acute systolic CHF LVEF 35%.  Had cardioversion after MELVIN did not show thrombus.  Tachycardic on cardizem IV.        Assessment and Plan       Diagnosis Orders   1. Longstanding persistent atrial fibrillation (HCC)  ECHO COMPLETE ADULT (TTE) W OR WO CONTR- Clinic Performed      2. Primary hypertension        3. NICM (nonischemic cardiomyopathy) (HCC)  Digoxin Level      4. Morbid (severe) obesity due to excess calories        5. Chronic systolic (congestive) heart failure (HCC)        6. Typical atrial flutter (HCC)        7. Anticoagulated             Persistent AF Asymptomatic, failed multiple AADs.  Rate controlled today on Toprol XL & digoxin 0.25 mg po daily.     He's previously declined ablation or biventricular ICD & AV node ablation due to cost of procedure copay.      NICM: Echo in 2024 showed LVEF

## 2024-12-08 LAB — DIGOXIN SERPL-MCNC: 0.8 NG/ML (ref 0.5–0.9)

## 2024-12-09 ENCOUNTER — TELEPHONE (OUTPATIENT)
Age: 59
End: 2024-12-09

## 2024-12-09 NOTE — TELEPHONE ENCOUNTER
----- Message from Dr. Benjamin Deras MD sent at 12/9/2024  5:51 AM EST -----  Normal level  Ok to continue digoxin

## 2024-12-12 RX ORDER — FUROSEMIDE 40 MG/1
40 TABLET ORAL DAILY
Qty: 90 TABLET | Refills: 1 | Status: SHIPPED | OUTPATIENT
Start: 2024-12-12

## 2024-12-12 RX ORDER — ALLOPURINOL 100 MG/1
100 TABLET ORAL DAILY
Qty: 90 TABLET | Refills: 1 | Status: SHIPPED | OUTPATIENT
Start: 2024-12-12

## 2024-12-12 NOTE — TELEPHONE ENCOUNTER
Med refilled per VO by MD.    Future Appointments   Date Time Provider Department Center   5/28/2025  1:00 PM [facility]   12/9/2025  3:20 PM Benjamin Deras MD [facility]

## 2024-12-13 ENCOUNTER — TELEPHONE (OUTPATIENT)
Age: 59
End: 2024-12-13

## 2024-12-13 NOTE — TELEPHONE ENCOUNTER
Received labs dated 12/07/2024.    WBC 10.9  Hgb 13.8  Hct 44.4  Plt 294    Glu 102  BUN 18  Cr 0.97  Na 141  K 4.5  AST 14  ALT 12    Tchol 133  Tri 115  HDL 46  LDL 66    TSH 3.14

## 2024-12-26 ENCOUNTER — OFFICE VISIT (OUTPATIENT)
Age: 59
End: 2024-12-26
Payer: COMMERCIAL

## 2024-12-26 VITALS
HEART RATE: 80 BPM | SYSTOLIC BLOOD PRESSURE: 103 MMHG | HEIGHT: 69 IN | OXYGEN SATURATION: 96 % | BODY MASS INDEX: 43.22 KG/M2 | WEIGHT: 291.8 LBS | DIASTOLIC BLOOD PRESSURE: 64 MMHG | TEMPERATURE: 97.3 F | RESPIRATION RATE: 16 BRPM

## 2024-12-26 DIAGNOSIS — L03.116 CELLULITIS OF LEFT LOWER EXTREMITY: ICD-10-CM

## 2024-12-26 DIAGNOSIS — S81.812A LACERATION OF LEFT LOWER EXTREMITY, INITIAL ENCOUNTER: Primary | ICD-10-CM

## 2024-12-26 PROCEDURE — 99213 OFFICE O/P EST LOW 20 MIN: CPT | Performed by: INTERNAL MEDICINE

## 2024-12-26 RX ORDER — CEPHALEXIN 500 MG/1
500 CAPSULE ORAL 4 TIMES DAILY
Qty: 28 CAPSULE | Refills: 0 | Status: SHIPPED | OUTPATIENT
Start: 2024-12-26 | End: 2025-01-02

## 2024-12-26 ASSESSMENT — PATIENT HEALTH QUESTIONNAIRE - PHQ9
SUM OF ALL RESPONSES TO PHQ9 QUESTIONS 1 & 2: 0
2. FEELING DOWN, DEPRESSED OR HOPELESS: NOT AT ALL
1. LITTLE INTEREST OR PLEASURE IN DOING THINGS: NOT AT ALL
SUM OF ALL RESPONSES TO PHQ QUESTIONS 1-9: 0

## 2024-12-26 NOTE — PROGRESS NOTES
noted  Skin:  left lower leg with 2 laceration with skin tears about 2 inches in length.  Erythema and warmth around both areas. No bleeding.             The patient (or guardian, if applicable) and other individuals in attendance with the patient were advised that Artificial Intelligence will be utilized during this visit to record and process the conversation to generate a clinical note. The patient (or guardian, if applicable) and other individuals in attendance at the appointment consented to the use of AI, including the recording.

## 2025-01-09 RX ORDER — METOPROLOL SUCCINATE 50 MG/1
50 TABLET, EXTENDED RELEASE ORAL 2 TIMES DAILY
Qty: 180 TABLET | Refills: 1 | Status: SHIPPED | OUTPATIENT
Start: 2025-01-09

## 2025-01-09 NOTE — TELEPHONE ENCOUNTER
VO per NP    Future Appointments   Date Time Provider Department Center   5/28/2025  1:00 PM [facility]   12/9/2025  3:20 PM Benjamin Deras MD [facility]

## 2025-03-06 RX ORDER — DIGOXIN 250 MCG
250 TABLET ORAL DAILY
Qty: 90 TABLET | Refills: 1 | Status: SHIPPED | OUTPATIENT
Start: 2025-03-06

## 2025-03-07 LAB — HBA1C MFR BLD HPLC: 6.5 %

## 2025-04-25 DIAGNOSIS — I10 PRIMARY HYPERTENSION: ICD-10-CM

## 2025-04-25 RX ORDER — LOSARTAN POTASSIUM 25 MG/1
25 TABLET ORAL DAILY
Qty: 90 TABLET | Refills: 1 | Status: SHIPPED | OUTPATIENT
Start: 2025-04-25

## 2025-04-25 NOTE — TELEPHONE ENCOUNTER
Request for LOSARTAN 25 MG.  Last office visit 11/26/24, next office visit 12/9/25. Refills per verbal order from Dr. Deras.

## 2025-05-20 RX ORDER — ALLOPURINOL 100 MG/1
100 TABLET ORAL DAILY
Qty: 90 TABLET | Refills: 1 | Status: SHIPPED | OUTPATIENT
Start: 2025-05-20

## 2025-05-20 RX ORDER — FUROSEMIDE 40 MG/1
40 TABLET ORAL DAILY
Qty: 90 TABLET | Refills: 1 | Status: SHIPPED | OUTPATIENT
Start: 2025-05-20

## 2025-05-29 ENCOUNTER — RESULTS FOLLOW-UP (OUTPATIENT)
Age: 60
End: 2025-05-29

## 2025-06-19 RX ORDER — METOPROLOL SUCCINATE 50 MG/1
50 TABLET, EXTENDED RELEASE ORAL 2 TIMES DAILY
Qty: 180 TABLET | Refills: 1 | Status: SHIPPED | OUTPATIENT
Start: 2025-06-19

## 2025-06-19 RX ORDER — RIVAROXABAN 20 MG/1
TABLET, FILM COATED ORAL
Qty: 90 TABLET | Refills: 1 | Status: SHIPPED | OUTPATIENT
Start: 2025-06-19

## 2025-06-19 NOTE — TELEPHONE ENCOUNTER
Request for Toprol 50 mg, Xarelto 20 mg.  Last office visit 11/26/24, next office visit 12/9/25. Refills per verbal order from Raquel Badillo NP.

## 2025-08-18 RX ORDER — DIGOXIN 250 MCG
250 TABLET ORAL DAILY
Qty: 90 TABLET | Refills: 1 | Status: SHIPPED | OUTPATIENT
Start: 2025-08-18